# Patient Record
Sex: MALE | Race: WHITE | Employment: UNEMPLOYED | URBAN - METROPOLITAN AREA
[De-identification: names, ages, dates, MRNs, and addresses within clinical notes are randomized per-mention and may not be internally consistent; named-entity substitution may affect disease eponyms.]

---

## 2021-03-23 ENCOUNTER — HOSPITAL ENCOUNTER (OUTPATIENT)
Dept: LAB | Age: 41
Discharge: HOME | End: 2021-03-23
Payer: COMMERCIAL

## 2021-03-23 ENCOUNTER — OFFICE VISIT (OUTPATIENT)
Dept: HEMATOLOGY | Age: 41
End: 2021-03-23
Payer: COMMERCIAL

## 2021-03-23 VITALS
DIASTOLIC BLOOD PRESSURE: 78 MMHG | BODY MASS INDEX: 24.35 KG/M2 | OXYGEN SATURATION: 99 % | WEIGHT: 210.5 LBS | HEIGHT: 78 IN | HEART RATE: 78 BPM | TEMPERATURE: 97.5 F | SYSTOLIC BLOOD PRESSURE: 121 MMHG

## 2021-03-23 DIAGNOSIS — B18.2 CHRONIC HEPATITIS C WITHOUT HEPATIC COMA (HCC): ICD-10-CM

## 2021-03-23 DIAGNOSIS — B18.2 CHRONIC HEPATITIS C WITHOUT HEPATIC COMA (HCC): Primary | ICD-10-CM

## 2021-03-23 PROBLEM — F19.91 H/O INTRAVENOUS DRUG USE IN REMISSION: Status: ACTIVE | Noted: 2021-03-23

## 2021-03-23 LAB
ALBUMIN SERPL-MCNC: 4.1 G/DL (ref 3.4–5)
ALBUMIN/GLOB SERPL: 1.1 {RATIO} (ref 0.8–1.7)
ALP SERPL-CCNC: 106 U/L (ref 45–117)
ALT SERPL-CCNC: 726 U/L (ref 16–61)
ANION GAP SERPL CALC-SCNC: 3 MMOL/L (ref 3–18)
AST SERPL-CCNC: 288 U/L (ref 10–38)
BASOPHILS # BLD: 0 K/UL (ref 0–0.1)
BASOPHILS NFR BLD: 0 % (ref 0–2)
BILIRUB DIRECT SERPL-MCNC: 0.3 MG/DL (ref 0–0.2)
BILIRUB SERPL-MCNC: 0.7 MG/DL (ref 0.2–1)
BUN SERPL-MCNC: 16 MG/DL (ref 7–18)
BUN/CREAT SERPL: 21 (ref 12–20)
CALCIUM SERPL-MCNC: 8.8 MG/DL (ref 8.5–10.1)
CHLORIDE SERPL-SCNC: 101 MMOL/L (ref 100–111)
CO2 SERPL-SCNC: 32 MMOL/L (ref 21–32)
CREAT SERPL-MCNC: 0.77 MG/DL (ref 0.6–1.3)
DIFFERENTIAL METHOD BLD: ABNORMAL
EOSINOPHIL # BLD: 0.1 K/UL (ref 0–0.4)
EOSINOPHIL NFR BLD: 2 % (ref 0–5)
ERYTHROCYTE [DISTWIDTH] IN BLOOD BY AUTOMATED COUNT: 13 % (ref 11.6–14.5)
GLOBULIN SER CALC-MCNC: 3.7 G/DL (ref 2–4)
GLUCOSE SERPL-MCNC: 77 MG/DL (ref 74–99)
HCT VFR BLD AUTO: 40.9 % (ref 36–48)
HGB BLD-MCNC: 13.9 G/DL (ref 13–16)
LYMPHOCYTES # BLD: 1.4 K/UL (ref 0.9–3.6)
LYMPHOCYTES NFR BLD: 40 % (ref 21–52)
MCH RBC QN AUTO: 30.2 PG (ref 24–34)
MCHC RBC AUTO-ENTMCNC: 34 G/DL (ref 31–37)
MCV RBC AUTO: 88.9 FL (ref 74–97)
MONOCYTES # BLD: 0.5 K/UL (ref 0.05–1.2)
MONOCYTES NFR BLD: 16 % (ref 3–10)
NEUTS SEG # BLD: 1.4 K/UL (ref 1.8–8)
NEUTS SEG NFR BLD: 42 % (ref 40–73)
PLATELET # BLD AUTO: 191 K/UL (ref 135–420)
PMV BLD AUTO: 10.9 FL (ref 9.2–11.8)
POTASSIUM SERPL-SCNC: 4.2 MMOL/L (ref 3.5–5.5)
PROT SERPL-MCNC: 7.8 G/DL (ref 6.4–8.2)
RBC # BLD AUTO: 4.6 M/UL (ref 4.7–5.5)
SODIUM SERPL-SCNC: 136 MMOL/L (ref 136–145)
WBC # BLD AUTO: 3.4 K/UL (ref 4.6–13.2)

## 2021-03-23 PROCEDURE — 87902 NFCT AGT GNTYP ALYS HEP C: CPT

## 2021-03-23 PROCEDURE — 36415 COLL VENOUS BLD VENIPUNCTURE: CPT

## 2021-03-23 PROCEDURE — 85025 COMPLETE CBC W/AUTO DIFF WBC: CPT

## 2021-03-23 PROCEDURE — 80076 HEPATIC FUNCTION PANEL: CPT

## 2021-03-23 PROCEDURE — 86708 HEPATITIS A ANTIBODY: CPT

## 2021-03-23 PROCEDURE — 80048 BASIC METABOLIC PNL TOTAL CA: CPT

## 2021-03-23 PROCEDURE — 86704 HEP B CORE ANTIBODY TOTAL: CPT

## 2021-03-23 PROCEDURE — 87521 HEPATITIS C PROBE&RVRS TRNSC: CPT

## 2021-03-23 PROCEDURE — 99203 OFFICE O/P NEW LOW 30 MIN: CPT | Performed by: INTERNAL MEDICINE

## 2021-03-23 RX ORDER — BUPRENORPHINE HYDROCHLORIDE, NALOXONE HYDROCHLORIDE 8; 2 MG/1; MG/1
FILM, SOLUBLE BUCCAL; SUBLINGUAL
COMMUNITY
Start: 2021-03-11 | End: 2021-12-06

## 2021-03-23 NOTE — PROGRESS NOTES
[facility]      Frank Alvarez MD, FACP, FACG, FAASLD    David Verma MD, MPH      MARKOS Amaya-IDANIA Vanessa, [facility]     April S Kei, [facility]   Veronn Hunter, [facility]-    Dee Palma, [facility]    at [facility]    [address], [address]    [place]  [address] [phone]    FAX: [phone]   [facility]    at [facility]    [address], [address]    [place]  [address] [phone]    FAX: [phone]         Patient Care Team:  Gris Olmstead NP as PCP - General (Nurse Practitioner)      Problem List  Date Reviewed: 3/23/2021          Codes Class Noted    Chronic hepatitis C (HCC) ICD-10-CM: B18.2  ICD-9-CM: 070.54  3/23/2021        H/O intravenous drug use in remission ICD-10-CM: Z87.898  ICD-9-CM: 305.93  3/23/2021              The clinicians listed above have asked me to see Dale Sifuentes in consultation regarding chronic HCV and its management.  All medical records sent by the referring physicians were reviewed    The patient is a 41 y.o.  male who was found to have abnormalities in liver chemistries and subsequently tested positive for chronic HCV in 3/2021.      Risk factors for acquiring HCV are IV drug use, cocaine in 2005 - 12/2020.  There was no history of an episode of acute icteric hepatitis at the time of these risk factors.      Imaging of the liver was not performed.      An assessment of liver fibrosis with biopsy or elastography has not been performed.      The patient has never received treatment for chronic HCV.      The patient has the following symptoms which could be attributed to the liver disorder:  fatigue, pain in the right side over the liver, arthralgias,     The patient is not experiencing the following symptoms which could  be attributed to liver disorder: problems concentrating, swelling of the abdomen,   swelling of the lower extremities,     The patient completes all daily activities without any functional limitations. ASSESSMENT AND PLAN:  Chronic HCV   Chronic HCV of unclear severity. Liver transaminases are elevated. ALP is normal.  Liver function is normal.  The platelet count is normal.      Will perform laboratory testing to monitor liver function and degree of liver injury. This included BMP, hepatic panel, CBC with platelet count,     Will perform and/or review results of HCV viral load, HCV genotype to define the specific treatment and duration of treatment that will be required. Will perform serologic and virologic studies to assess for other causes of chronic liver disease. Will perform imaging of the liver with ultrasound. The need to perform an assessment of liver fibrosis was discussed with the patient. The Fibroscan can assess liver fibrosis and determine if a patient has advanced fibrosis or cirrhosis without the need for liver biopsy. This will be performed at the next office visit. Since the patient has Medicaid the degree of fibrosis will be assessed by HCV Fibrosure and FIB-4. Chronic HCV Treatment  The patient has not been treated for HCV. The patient has HCV genotype that is not yet defined. Discussed the treatment alternatives. The SVR/cure rate for HCV now exceeds 97% without significant side effects for most patients with HCV. The specific treatment is dependent upon genotype, viral load and histology. Screening for Hepatocellular Carcinoma  HCC screening is not necessary if the patient has no evidence of cirrhosis. Treatment of other medical problems in patients with chronic liver disease  There are no contraindications for the patient to take most medications that are necessary for treatment of other medical issues.     Normal doses of acetaminophen, as recommended on the label of the bottle, are not hepatotoxic except in the setting of daily alcohol use, even in patients with cirrhosis and can be utilized for pain. Counseling for alcohol in patients with chronic liver disease  The patient was counseled regarding alcohol consumption and the effect of alcohol on chronic liver disease. The patient has cirrhosis and was advised to be abstinent from all alcohol including non-alcoholic beer which does contain some alcohol. The patient does not consume any significant amount of alcohol. Substance Use  The patient was counseled regarding the risk of overdose and death from using opioids and other narcotic drugs. Discussed the risk of becoming reinfected with HCV once they are cured if they resume IV drug use or inhaling drugs nasally. The patient has not used drugs since 12/2020. There is no contraindication to treating HCV in patients who are actively using drugs and the SVR/cure rate is the same as persons who no not use drugs. Vaccinations   The need for vaccination against viral hepatitis A and B will be assessed with serologic and instituted as appropriate. Routine vaccinations against other bacterial and viral agents can be performed as indicated. Annual flu vaccination should be administered if indicated. ALLERGIES  No Known Allergies    MEDICATIONS  Current Outpatient Medications   Medication Sig    Suboxone 8-2 mg film sublingaul film DISSOLVE ONE FILM UNDER THE TONGUE ONCE A DAY    omeprazole-sodium bicarbonate 40-1,680 mg Pack Take  by mouth. No current facility-administered medications for this visit. SYSTEM REVIEW NOT RELATED TO LIVER DISEASE OR REVIEWED ABOVE:  Constitution systems: Negative for fever, chills, weight gain, weight loss. Eyes: Negative for visual changes. ENT: Negative for sore throat, painful swallowing. Respiratory: Negative for cough, hemoptysis, SOB.    Cardiology: Negative for chest pain, palpitations. GI:  Negative for constipation or diarrhea. : Negative for urinary frequency, dysuria, hematuria, nocturia. Skin: Negative for rash. Hematology: Negative for easy bruising, blood clots. Musculo-skelatal: Negative for back pain, muscle pain, weakness. Neurologic: Negative for headaches, dizziness, vertigo, memory problems not related to HE. Psychology: Negative for anxiety, depression. FAMILY HISTORY:  The father Has/had the following following chronic disease(s): unknown medical issues. The mother Has/had the following chronic disease(s): None. There is no family history of liver disease. SOCIAL HISTORY:  The patient has never been . The patient has no children. The patient stopped using tobacco products in 2/2021. The patient currently vapes. The patient has previously consumed alcohol in excess. The patient has been abstinent from alcohol since 12/2021. 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 PHYSICAL EXAMINATION:  Visit Vitals  /78   Pulse 78   Temp 97.5 °F (36.4 °C) (Tympanic)   Ht 6' 6.5\" (1.994 m)   Wt 210 lb 8 oz (95.5 kg)   SpO2 99%   BMI 24.02 kg/m²     General: No acute distress. Eyes: Sclera anicteric. ENT: No oral lesions. Thyroid normal.  Nodes: No adenopathy. Skin: No spider angiomata. No jaundice. No palmar erythema. Respiratory: Lungs clear to auscultation. Cardiovascular: Regular heart rate. No murmurs. No JVD. Abdomen: Soft non-tender. Liver size normal to percussion/palpation. Spleen not palpable. No obvious ascites. Extremities: No edema. No muscle wasting. No gross arthritic changes. Neurologic: Alert and oriented. Cranial nerves grossly intact. No asterixis. LABORATORY STUDIES:  From 3/2021  AST/ALT/ALP/T Bili/ALB:  282/678/128/0.6/4.3  WBC/HB/PLT/INR:  3.7/13.4/188  NA/BUN/CREAT:  14/0.65    SEROLOGIES:  3/2021.   HBsurface antigen negative, anti-HCV positive, anti-HIV negative    LIVER HISTOLOGY:  Not available or performed    ENDOSCOPIC PROCEDURES:  Not available or performed    RADIOLOGY:  Not available or performed    OTHER TESTING:  Not available or performed    FOLLOW-UP:  All of the issues listed above in the Assessment and Plan were discussed with the patient. All questions were answered. The patient expressed a clear understanding of the above. [address] [place] [address] in 4 weeks to review all data and determine the treatment plan.       Benito Collazo MD  [address] [address]  [address], [address] [place] [address], [address]  [address] [place] [address]

## 2021-03-23 NOTE — Clinical Note
3/23/2021 Patient: Aneita Severin YOB: 1980 Date of Visit: 3/23/2021 Mil Castellanos NP 
[address] [place] [address] [address] Fax: [phone] Dear Mil Castellanos NP, Thank you for referring Mr. Aneita Severin to [address] [address] for evaluation. My notes for this consultation are attached. If you have questions, please do not hesitate to call me. I look forward to following your patient along with you. Sincerely, Colin Cheng MD

## 2021-03-24 LAB
HAV AB SER QL IA: POSITIVE
HBV CORE AB SERPL QL IA: NEGATIVE

## 2021-03-26 LAB
HCV GENTYP SERPL NAA+PROBE: NORMAL
HCV RNA SERPL NAA+PROBE-LOG IU: 7.02 HCV LOG 10IU/ML
HCV RNA SERPL PROBE AMP-ACNC: ABNORMAL HCVIU/ML
HCV RNA SERPL QL NAA+PROBE: POSITIVE
PLEASE NOTE, 550474: NORMAL

## 2021-03-30 ENCOUNTER — HOSPITAL ENCOUNTER (OUTPATIENT)
Dept: ULTRASOUND IMAGING | Age: 41
Discharge: HOME | End: 2021-03-30
Payer: COMMERCIAL

## 2021-03-30 DIAGNOSIS — B18.2 CHRONIC HEPATITIS C WITHOUT HEPATIC COMA (HCC): ICD-10-CM

## 2021-03-30 PROCEDURE — 76705 ECHO EXAM OF ABDOMEN: CPT

## 2021-04-22 ENCOUNTER — OFFICE VISIT (OUTPATIENT)
Dept: HEMATOLOGY | Age: 41
End: 2021-04-22
Payer: COMMERCIAL

## 2021-04-22 VITALS
OXYGEN SATURATION: 98 % | SYSTOLIC BLOOD PRESSURE: 123 MMHG | HEIGHT: 78 IN | WEIGHT: 216.8 LBS | DIASTOLIC BLOOD PRESSURE: 71 MMHG | TEMPERATURE: 98 F | HEART RATE: 76 BPM | RESPIRATION RATE: 15 BRPM | BODY MASS INDEX: 25.08 KG/M2

## 2021-04-22 DIAGNOSIS — B18.2 CHRONIC HEPATITIS C WITHOUT HEPATIC COMA (HCC): Primary | ICD-10-CM

## 2021-04-22 PROCEDURE — 91200 LIVER ELASTOGRAPHY: CPT | Performed by: NURSE PRACTITIONER

## 2021-04-22 PROCEDURE — 99214 OFFICE O/P EST MOD 30 MIN: CPT | Performed by: NURSE PRACTITIONER

## 2021-04-22 RX ORDER — VELPATASVIR AND SOFOSBUVIR 100; 400 MG/1; MG/1
1 TABLET, FILM COATED ORAL DAILY
Qty: 28 TAB | Refills: 2 | Status: SHIPPED | OUTPATIENT
Start: 2021-04-22 | End: 2021-08-31 | Stop reason: SINTOL

## 2021-04-22 NOTE — PROGRESS NOTES
Love Fernandez MD, Mónica Dailey MD, MPH      Remy Ludwig, TIAN Zarate Ma, [facility]     Suzanne Choudhury, [facility]   Day[facility]    [facility]       Anam Oviedo [facility]    at [address], [address] [place] [address] 22.    [phone]    FAX: [address], [address]    [phone]    FAX: [phone]        Patient Care Team:  Mine Hernandez NP as PCP - General (Nurse Practitioner)      Problem List  Date Reviewed: 4/22/2021          Codes Class Noted    Chronic hepatitis C [facility]) ICD-10-CM: B18.2  ICD-9-CM: 070.54  3/23/2021        H/O intravenous drug use in remission ICD-10-CM: Z87.898  ICD-9-CM: 305.93  3/23/2021              Trinh Cole is being seen at [address] for management of chronic HCV. The active problem list, all pertinent past medical history, medications, liver histology, radiologic findings, and laboratory findings related to the liver disorder were reviewed and discussed with the patient. The patient is a 46 y.o.  male who was found to have abnormalities in liver chemistries and subsequently tested positive for chronic HCV in 3/2021. Risk factors for acquiring HCV are IV drug use, cocaine in 2005 - 12/2020. There was no history of an episode of acute icteric hepatitis at the time of these risk factors. The most recent imaging of the liver was Ultrasound performed in 3/2021. Results suggest that the liver is normal.  No liver mass lesions noted. Assessment of liver fibrosis with Fibroscan was performed in the office today. The result was 7.8 kPa which correlates with stage 1 portal fibrosis.  The CAP score of 307 suggests hepatic steatosis. The patient has never received treatment for chronic HCV. The patient has the following symptoms which could be attributed to the liver disorder:  fatigue, pain in the right side over the liver, arthralgias. The patient is not experiencing the following symptoms which could be attributed to liver disorder: problems concentrating, swelling of the abdomen, swelling of the lower extremities. The patient completes all daily activities without any functional limitations. ASSESSMENT AND PLAN:   Chronic HCV   Chronic HCV with portal fibrosis. Liver transaminases are elevated. ALP is normal.  Liver function is normal.  The platelet count is normal.      Have performed laboratory testing to monitor liver function and degree of liver injury. This included BMP, hepatic panel, CBC with platelet count. Have reviewed results of HCV viral load, HCV genotype to define the specific treatment and duration of treatment that will be required. Serologic and virologic studies to assess for other causes of chronic liver disease were negative. The need to perform an assessment of liver fibrosis was discussed with the patient. The Fibroscan can assess liver fibrosis and determine if a patient has advanced fibrosis or cirrhosis without the need for liver biopsy. Chronic HCV Treatment  The patient has not been treated for HCV. The patient has HCV genotype 1a. Discussed the treatment alternatives. The SVR/cure rate for HCV now exceeds 97% without significant side effects for most patients with HCV. The specific treatment is dependent upon genotype, viral load and histology. The patient should be treated with Mavyret (glecaprevir and piprentasvir) or Epclusa (sofosbuvir and valpitasvir). The SVR/cure rate for is over 95%.     We will request pre-authorization for the HCV medication subject to the approval guidelines of the patient's insurance carrier. If the patient is denied we may appeal on their behalf. I have explained to him that I will order the medications from the specialty pharmacy and they will be delivered to his home. He may begin taking the treatment medications as soon as they arrive. He is to call and make an appointment for treatment week #4 once he begins therapy. He voiced understanding of this plan. Screening for Hepatocellular Carcinoma  HCC screening is not necessary if the patient has no evidence of cirrhosis. Treatment of other medical problems in patients with chronic liver disease  There are no contraindications for the patient to take most medications that are necessary for treatment of other medical issues. Normal doses of acetaminophen, as recommended on the label of the bottle, are not hepatotoxic except in the setting of daily alcohol use, even in patients with cirrhosis and can be utilized for pain. Counseling for alcohol in patients with chronic liver disease  The patient was counseled regarding alcohol consumption and the effect of alcohol on chronic liver disease. The patient has cirrhosis and was advised to be abstinent from all alcohol including non-alcoholic beer which does contain some alcohol. The patient does not consume any significant amount of alcohol. Substance Use  The patient was counseled regarding the risk of overdose and death from using opioids and other narcotic drugs. Discussed the risk of becoming reinfected with HCV once they are cured if they resume IV drug use or inhaling drugs nasally. The patient has not used drugs since 12/2020. There is no contraindication to treating HCV in patients who are actively using drugs and the SVR/cure rate is the same as persons who do not use drugs. Vaccinations   The need for vaccination against viral hepatitis A and B will be assessed with serologic and instituted as appropriate.   Routine vaccinations against other bacterial and viral agents can be performed as indicated. Annual flu vaccination should be administered if indicated. ALLERGIES  No Known Allergies    MEDICATIONS  Current Outpatient Medications   Medication Sig    Suboxone 8-2 mg film sublingaul film DISSOLVE ONE FILM UNDER THE TONGUE ONCE A DAY     No current facility-administered medications for this visit. SYSTEM REVIEW NOT RELATED TO LIVER DISEASE OR REVIEWED ABOVE:  Constitution systems: Negative for fever, chills, weight gain, weight loss. Eyes: Negative for visual changes. ENT: Negative for sore throat, painful swallowing. Respiratory: Negative for cough, hemoptysis, SOB. Cardiology: Negative for chest pain, palpitations. GI:  Negative for constipation or diarrhea. : Negative for urinary frequency, dysuria, hematuria, nocturia. Skin: Negative for rash. Hematology: Negative for easy bruising, blood clots. Musculo-skelatal: Negative for back pain, muscle pain, weakness. Neurologic: Negative for headaches, dizziness, vertigo, memory problems not related to HE. Psychology: Negative for anxiety, depression. FAMILY HISTORY:  The father Has/had the following following chronic disease(s): unknown medical issues. The mother Has/had the following chronic disease(s): None. There is no family history of liver disease. SOCIAL HISTORY:  The patient has never been . The patient has no children. The patient stopped using tobacco products in 2/2021. The patient currently vapes. The patient has previously consumed alcohol in excess. The patient has been abstinent from alcohol since 12/2021. 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         PHYSICAL EXAMINATION:  Visit Vitals  /71 (BP 1 Location: Left upper arm, BP Patient Position: Sitting, BP Cuff Size: Small adult)   Pulse 76   Temp 98 °F (36.7 °C)   Resp 15   Ht 6' 6.6\" (1.996 m)   Wt 216 lb 12.8 oz (98.3 kg) SpO2 98%   BMI 24.67 kg/m²     General: No acute distress. Eyes: Sclera anicteric. ENT: No oral lesions. Thyroid normal.  Nodes: No adenopathy. Skin: No spider angiomata. No jaundice. No palmar erythema. Respiratory: Lungs clear to auscultation. Cardiovascular: Regular heart rate. No murmurs. No JVD. Abdomen: Soft non-tender. Liver size normal to percussion/palpation. Spleen not palpable. No obvious ascites. Extremities: No edema. No muscle wasting. No gross arthritic changes. Neurologic: Alert and oriented. Cranial nerves grossly intact. No asterixis. LABORATORY STUDIES:  Liver [place] of [address] Units 3/23/2021   WBC 4.6 - 13.2 K/uL 3.4 (L)   ANC 1.8 - 8.0 K/UL 1.4 (L)   HGB 13.0 - 16.0 g/dL 13.9    - 420 K/uL 191   AST 10 - 38 U/L 288 (H)   ALT 16 - 61 U/L 726 (H)   Alk Phos 45 - 117 U/L 106   Bili, Total 0.2 - 1.0 MG/DL 0.7   Bili, Direct 0.0 - 0.2 MG/DL 0.3 (H)   Albumin 3.4 - 5.0 g/dL 4.1   BUN 7.0 - 18 MG/DL 16   Creat 0.6 - 1.3 MG/DL 0.77   Na 136 - 145 mmol/L 136   K 3.5 - 5.5 mmol/L 4.2   Cl 100 - 111 mmol/L 101   CO2 21 - 32 mmol/L 32   Glucose 74 - 99 mg/dL 77       SEROLOGIES:  3/2021. HBsurface antigen negative, anti-HCV positive, anti-HIV negative    Serologies Latest Ref Rng & Units 3/23/2021   Hep A Ab, Total Negative   Positive (A)   Hep B Core Ab, Total Negative   Negative   Hep C Genotype  1a     HCV RNA:  3/2021. 82,685,273 IU/mL    LIVER HISTOLOGY:  4/2021. [facility]. EkPa was 7.8. IQR/med 13%. . The results suggested a fibrosis level of F1. The CAP score suggests there is hepatic steatosis. ENDOSCOPIC PROCEDURES:  Not available or performed    RADIOLOGY:  Not available or performed    OTHER TESTING:  Not available or performed    FOLLOW-UP:  All of the issues listed above in the Assessment and Plan were discussed with the patient. All questions were answered.   The patient expressed a clear understanding of the above.     Return to [facility] [address] for monitoring of HCV treatment in 4 weeks after starting medication      Barbara Canada, BERNIEPAOLA-BC  [address] of [address], [address]  [phone]

## 2021-08-31 ENCOUNTER — HOSPITAL ENCOUNTER (OUTPATIENT)
Dept: LAB | Age: 41
Discharge: HOME | End: 2021-08-31
Payer: COMMERCIAL

## 2021-08-31 ENCOUNTER — OFFICE VISIT (OUTPATIENT)
Dept: HEMATOLOGY | Age: 41
End: 2021-08-31
Payer: COMMERCIAL

## 2021-08-31 VITALS
DIASTOLIC BLOOD PRESSURE: 87 MMHG | TEMPERATURE: 97.8 F | HEART RATE: 79 BPM | WEIGHT: 216 LBS | BODY MASS INDEX: 24.99 KG/M2 | HEIGHT: 78 IN | OXYGEN SATURATION: 99 % | SYSTOLIC BLOOD PRESSURE: 120 MMHG | RESPIRATION RATE: 19 BRPM

## 2021-08-31 DIAGNOSIS — B18.2 CHRONIC HEPATITIS C WITHOUT HEPATIC COMA (HCC): ICD-10-CM

## 2021-08-31 DIAGNOSIS — B18.2 CHRONIC HEPATITIS C WITHOUT HEPATIC COMA (HCC): Primary | ICD-10-CM

## 2021-08-31 PROCEDURE — 99213 OFFICE O/P EST LOW 20 MIN: CPT | Performed by: NURSE PRACTITIONER

## 2021-08-31 PROCEDURE — 87521 HEPATITIS C PROBE&RVRS TRNSC: CPT

## 2021-08-31 PROCEDURE — 36415 COLL VENOUS BLD VENIPUNCTURE: CPT

## 2021-08-31 RX ORDER — ESCITALOPRAM OXALATE 10 MG/1
10 TABLET ORAL DAILY
COMMUNITY

## 2021-08-31 NOTE — PROGRESS NOTES
Madelyn Louie MD, Nneka Gotti MD, MPH      TIAN Olivares, [facility]-BC     April S [facility]   Latricia Devine SABINA [facility]       Anam Oviedo [facility] 136    at [address]    [facility], [address] [place] [address], [facility] 22.    [phone]    FAX: [address] [place] [address], [address]    [phone]    FAX: [phone]        Patient Care Team:  Linh Smith NP as PCP - General (Nurse Practitioner)      Problem List  Date Reviewed: 9/1/2021        Codes Class Noted    Chronic hepatitis C [facility]) ICD-10-CM: B18.2  ICD-9-CM: 070.54  3/23/2021        H/O intravenous drug use in remission ICD-10-CM: Z87.898  ICD-9-CM: 305.93  3/23/2021              Quyen Jacinto is being seen at [address] for management of chronic HCV. The active problem list, all pertinent past medical history, medications, liver histology, radiologic findings, and laboratory findings related to the liver disorder were reviewed and discussed with the patient. The patient is a 46 y.o. male who was found to have abnormalities in liver chemistries and subsequently tested positive for chronic HCV in 3/2021. The most recent imaging of the liver was Ultrasound performed in 3/2021. Results suggest that the liver is normal.  No liver mass lesions noted. Assessment of liver fibrosis with Fibroscan was performed in the office today. The result was 7.8 kPa which correlates with stage 1 portal fibrosis. The CAP score of 307 suggests hepatic steatosis. The patient started treatment with Epclusa (sofosbuvir and valpitasvir).  He reports stopping medication for a week and then started back up with the rest of the medication. The patient has the following symptoms which could be attributed to the liver disorder:  fatigue, pain in the right side over the liver, arthralgias. The patient is not experiencing the following symptoms which could be attributed to liver disorder: problems concentrating, swelling of the abdomen, swelling of the lower extremities. The patient completes all daily activities without any functional limitations. ASSESSMENT AND PLAN:   Chronic HCV   Chronic HCV with portal fibrosis. Liver transaminases are elevated. ALP is normal.  Liver function is normal.  The platelet count is normal.      Will perform laboratory testing to monitor liver function and degree of liver injury. This included BMP, hepatic panel, CBC with platelet count. Serologic and virologic studies to assess for other causes of chronic liver disease were negative. The need to perform an assessment of liver fibrosis was discussed with the patient. The Fibroscan can assess liver fibrosis and determine if a patient has advanced fibrosis or cirrhosis without the need for liver biopsy. Chronic HCV Treatment  The patient was treated for HCV with Epclusa (sofosbuvir and valpitasvir) however he did not follow prescription instructions and missed approximately one week of treatment. The patient has HCV genotype 1a. Will perform laboratory testing to monitor response to HCV treatment. This will include HCV RNA QL REFLX TO QT. If HCV RNA is positive will need to retreat with Mavyret (glecaprevir/pibrentasvir)    If HCV RNA is negative will reassess in 12 weeks for SVR/cure. Screening for Hepatocellular Carcinoma  HCC screening is not necessary if the patient has no evidence of cirrhosis.     Treatment of other medical problems in patients with chronic liver disease  There are no contraindications for the patient to take most medications that are necessary for treatment of other medical issues. Normal doses of acetaminophen, as recommended on the label of the bottle, are not hepatotoxic except in the setting of daily alcohol use, even in patients with cirrhosis and can be utilized for pain. Counseling for alcohol in patients with chronic liver disease  The patient was counseled regarding alcohol consumption and the effect of alcohol on chronic liver disease. The patient has cirrhosis and was advised to be abstinent from all alcohol including non-alcoholic beer which does contain some alcohol. The patient does not consume any significant amount of alcohol. Substance Use  The patient was counseled regarding the risk of overdose and death from using opioids and other narcotic drugs. Discussed the risk of becoming reinfected with HCV once they are cured if they resume IV drug use or inhaling drugs nasally. The patient has not used drugs since 12/2020. There is no contraindication to treating HCV in patients who are actively using drugs and the SVR/cure rate is the same as persons who do not use drugs. Vaccinations   The need for vaccination against viral hepatitis A and B will be assessed with serologic and instituted as appropriate. Routine vaccinations against other bacterial and viral agents can be performed as indicated. Annual flu vaccination should be administered if indicated. ALLERGIES  No Known Allergies    MEDICATIONS  Current Outpatient Medications   Medication Sig    escitalopram oxalate (LEXAPRO) 10 mg tablet Take 10 mg by mouth daily.  Suboxone 8-2 mg film sublingaul film DISSOLVE ONE FILM UNDER THE TONGUE ONCE A DAY     No current facility-administered medications for this visit. SYSTEM REVIEW NOT RELATED TO LIVER DISEASE OR REVIEWED ABOVE:  Constitution systems: Negative for fever, chills, weight gain, weight loss. Eyes: Negative for visual changes. ENT: Negative for sore throat, painful swallowing.    Respiratory: Negative for cough, hemoptysis, SOB.   Cardiology: Negative for chest pain, palpitations. GI:  Negative for constipation or diarrhea. : Negative for urinary frequency, dysuria, hematuria, nocturia. Skin: Negative for rash. Hematology: Negative for easy bruising, blood clots. Musculo-skelatal: Negative for back pain, muscle pain, weakness. Neurologic: Negative for headaches, dizziness, vertigo, memory problems not related to HE. Psychology: Negative for anxiety, depression. FAMILY HISTORY:  The father Has/had the following following chronic disease(s): unknown medical issues. The mother Has/had the following chronic disease(s): None. There is no family history of liver disease. SOCIAL HISTORY:  The patient has never been . The patient has no children. The patient stopped using tobacco products in 2/2021. The patient currently vapes. The patient has previously consumed alcohol in excess. The patient has been abstinent from alcohol since 12/2021. 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 PHYSICAL EXAMINATION:  Visit Vitals  /87 (BP 1 Location: Left upper arm, BP Patient Position: Sitting, BP Cuff Size: Small adult)   Pulse 79   Temp 97.8 °F (36.6 °C)   Resp 19   Ht 6' 6\" (1.981 m)   Wt 216 lb (98 kg)   SpO2 99%   BMI 24.96 kg/m²     General: No acute distress. Eyes: Sclera anicteric. ENT: No oral lesions. Thyroid normal.  Nodes: No adenopathy. Skin: No spider angiomata. No jaundice. No palmar erythema. Respiratory: Lungs clear to auscultation. Cardiovascular: Regular heart rate. No murmurs. No JVD. Abdomen: Soft non-tender. Liver size normal to percussion/palpation. Spleen not palpable. No obvious ascites. Extremities: No edema. No muscle wasting. No gross arthritic changes. Neurologic: Alert and oriented. Cranial nerves grossly intact. No asterixis.     LABORATORY STUDIES:  From 8/2021  AST/ALT/ALP/T Bili/ALB: 61/35/52/1.2/4.8  WBC/HB/PLT: 6.2/12.7/209  NA/BUN/CREAT: 137/25/0.7    SEROLOGIES:  3/2021. HBsurface antigen negative, anti-HCV positive, anti-HIV negative    Serologies Latest Ref Rng & Units 3/23/2021   Hep A Ab, Total Negative   Positive (A)   Hep B Core Ab, Total Negative   Negative   Hep C Genotype  1a     HCV RNA:  3/2021. 32,498,218 IU/mL    LIVER HISTOLOGY:  4/2021. [facility]. EkPa was 7.8. IQR/med 13%. . The results suggested a fibrosis level of F1. The CAP score suggests there is hepatic steatosis. ENDOSCOPIC PROCEDURES:  Not available or performed    RADIOLOGY:  3/2021. Ultrasound of liver. Normal appearing liver. No liver mass lesions. OTHER TESTING:  Not available or performed    FOLLOW-UP:  All of the issues listed above in the Assessment and Plan were discussed with the patient. All questions were answered. The patient expressed a clear understanding of the above. Return to [place] [address] for monitoring of HCV treatment in 3 months to assess for SVR/cure.        Cherise Ganser, AGPCNP-BC  . Luca Borges [facility] [place] [address]  [facility], [address] [phone]

## 2021-09-03 LAB — HCV RNA SERPL QL NAA+PROBE: NEGATIVE

## 2021-12-06 ENCOUNTER — HOSPITAL ENCOUNTER (OUTPATIENT)
Dept: LAB | Age: 41
Discharge: HOME | End: 2021-12-06
Payer: COMMERCIAL

## 2021-12-06 ENCOUNTER — OFFICE VISIT (OUTPATIENT)
Dept: HEMATOLOGY | Age: 41
End: 2021-12-06
Payer: COMMERCIAL

## 2021-12-06 VITALS
BODY MASS INDEX: 24.96 KG/M2 | HEIGHT: 78 IN | SYSTOLIC BLOOD PRESSURE: 118 MMHG | HEART RATE: 73 BPM | OXYGEN SATURATION: 98 % | DIASTOLIC BLOOD PRESSURE: 77 MMHG | RESPIRATION RATE: 18 BRPM | TEMPERATURE: 97.5 F

## 2021-12-06 DIAGNOSIS — B18.2 CHRONIC HEPATITIS C WITHOUT HEPATIC COMA (HCC): Primary | ICD-10-CM

## 2021-12-06 DIAGNOSIS — B18.2 CHRONIC HEPATITIS C WITHOUT HEPATIC COMA (HCC): ICD-10-CM

## 2021-12-06 LAB
ALBUMIN SERPL-MCNC: 4.2 G/DL (ref 3.4–5)
ALBUMIN/GLOB SERPL: 1.2 {RATIO} (ref 0.8–1.7)
ALP SERPL-CCNC: 56 U/L (ref 45–117)
ALT SERPL-CCNC: 28 U/L (ref 16–61)
ANION GAP SERPL CALC-SCNC: 8 MMOL/L (ref 3–18)
AST SERPL-CCNC: 14 U/L (ref 10–38)
BASOPHILS # BLD: 0 K/UL (ref 0–0.1)
BASOPHILS NFR BLD: 1 % (ref 0–2)
BILIRUB DIRECT SERPL-MCNC: 0.2 MG/DL (ref 0–0.2)
BILIRUB SERPL-MCNC: 0.6 MG/DL (ref 0.2–1)
BUN SERPL-MCNC: 14 MG/DL (ref 7–18)
BUN/CREAT SERPL: 18 (ref 12–20)
CALCIUM SERPL-MCNC: 9 MG/DL (ref 8.5–10.1)
CHLORIDE SERPL-SCNC: 103 MMOL/L (ref 100–111)
CO2 SERPL-SCNC: 27 MMOL/L (ref 21–32)
CREAT SERPL-MCNC: 0.77 MG/DL (ref 0.6–1.3)
DIFFERENTIAL METHOD BLD: NORMAL
EOSINOPHIL # BLD: 0.1 K/UL (ref 0–0.4)
EOSINOPHIL NFR BLD: 2 % (ref 0–5)
ERYTHROCYTE [DISTWIDTH] IN BLOOD BY AUTOMATED COUNT: 13.1 % (ref 11.6–14.5)
GLOBULIN SER CALC-MCNC: 3.5 G/DL (ref 2–4)
GLUCOSE SERPL-MCNC: 78 MG/DL (ref 74–99)
HCT VFR BLD AUTO: 42.2 % (ref 36–48)
HGB BLD-MCNC: 14 G/DL (ref 13–16)
IMM GRANULOCYTES # BLD AUTO: 0 K/UL (ref 0–0.04)
IMM GRANULOCYTES NFR BLD AUTO: 0 % (ref 0–0.5)
LYMPHOCYTES # BLD: 2.8 K/UL (ref 0.9–3.6)
LYMPHOCYTES NFR BLD: 45 % (ref 21–52)
MCH RBC QN AUTO: 28.4 PG (ref 24–34)
MCHC RBC AUTO-ENTMCNC: 33.2 G/DL (ref 31–37)
MCV RBC AUTO: 85.6 FL (ref 78–100)
MONOCYTES # BLD: 0.6 K/UL (ref 0.05–1.2)
MONOCYTES NFR BLD: 9 % (ref 3–10)
NEUTS SEG # BLD: 2.7 K/UL (ref 1.8–8)
NEUTS SEG NFR BLD: 43 % (ref 40–73)
NRBC # BLD: 0 K/UL (ref 0–0.01)
NRBC BLD-RTO: 0 PER 100 WBC
PLATELET # BLD AUTO: 221 K/UL (ref 135–420)
PMV BLD AUTO: 10.7 FL (ref 9.2–11.8)
POTASSIUM SERPL-SCNC: 4 MMOL/L (ref 3.5–5.5)
PROT SERPL-MCNC: 7.7 G/DL (ref 6.4–8.2)
RBC # BLD AUTO: 4.93 M/UL (ref 4.35–5.65)
SODIUM SERPL-SCNC: 138 MMOL/L (ref 136–145)
WBC # BLD AUTO: 6.3 K/UL (ref 4.6–13.2)

## 2021-12-06 PROCEDURE — 36415 COLL VENOUS BLD VENIPUNCTURE: CPT

## 2021-12-06 PROCEDURE — 80048 BASIC METABOLIC PNL TOTAL CA: CPT

## 2021-12-06 PROCEDURE — 99213 OFFICE O/P EST LOW 20 MIN: CPT | Performed by: NURSE PRACTITIONER

## 2021-12-06 PROCEDURE — 85025 COMPLETE CBC W/AUTO DIFF WBC: CPT

## 2021-12-06 PROCEDURE — 80076 HEPATIC FUNCTION PANEL: CPT

## 2021-12-06 PROCEDURE — 87521 HEPATITIS C PROBE&RVRS TRNSC: CPT

## 2021-12-06 RX ORDER — NAPROXEN SODIUM 220 MG
220 TABLET ORAL 2 TIMES DAILY WITH MEALS
COMMUNITY

## 2021-12-06 RX ORDER — GABAPENTIN 300 MG/1
300 CAPSULE ORAL 3 TIMES DAILY
COMMUNITY
End: 2022-08-11 | Stop reason: SDUPTHER

## 2021-12-06 NOTE — PROGRESS NOTES
Remi Etienne MD, Joshua Leal MD, MPH      TIAN Leon, NIKKIP-BETHANY Choudhury [facility]   OLGA Savage [facility]       Anam Oviedo [facility] 136    at [address], [address] [place] [address], [facility] 22.    [phone]    FAX: [address] [place] [address]    at [address], [address]    [phone]    FAX: [phone]        Patient Care Team:  Yadira Mahajan NP as PCP - General (Nurse Practitioner)      Problem List  Date Reviewed: 12/6/2021          Codes Class Noted    Chronic hepatitis C [facility]) ICD-10-CM: B18.2  ICD-9-CM: 070.54  3/23/2021        H/O intravenous drug use in remission ICD-10-CM: Z87.898  ICD-9-CM: 305.93  3/23/2021              Heron Valdivia is being seen at [facility] for management of chronic HCV. The active problem list, all pertinent past medical history, medications, liver histology, radiologic findings, and laboratory findings related to the liver disorder were reviewed and discussed with the patient. The patient is a 46 y.o. male who was found to have abnormalities in liver chemistries and subsequently tested positive for chronic HCV in 3/2021. Since last visit: patient was hospitalized for nontraumatic compartment syndrome of LLE, Sepsis, and Rhabdomyolysis    The most recent imaging of the liver was Ultrasound performed in 3/2021. Results suggest that the liver is normal.  No liver mass lesions noted. Assessment of liver fibrosis with Fibroscan was performed in 4/2021. The result was 7.8 kPa which correlates with stage 1 portal fibrosis. The CAP score of 307 suggests hepatic steatosis.     The patient started treatment with Epclusa (sofosbuvir and valpitasvir). He reports stopping medication for a week and then started back up with the rest of the medication. The patient does not have any symptoms which could be attributed to the liver disorder. The patient has mild limitations in functional activities which can be attributed to other medical problems that are not related to the liver disease. ASSESSMENT AND PLAN:   Chronic HCV   Chronic HCV with portal fibrosis. Liver transaminases are normal.  ALP is elevated. Liver function is normal.  The platelet count is normal.      Will perform laboratory testing to monitor liver function and degree of liver injury. This included BMP, hepatic panel, CBC with platelet count. Serologic and virologic studies to assess for other causes of chronic liver disease were negative. The need to perform an assessment of liver fibrosis was discussed with the patient. The Fibroscan can assess liver fibrosis and determine if a patient has advanced fibrosis or cirrhosis without the need for liver biopsy. Chronic HCV Treatment  The patient was treated for HCV with Epclusa (sofosbuvir and valpitasvir) however he did not follow prescription instructions and missed approximately one week of treatment. The patient has HCV genotype 1a. Will perform laboratory testing to monitor response to HCV treatment and assess for SVR/cure. This will include HCV RNA QL REFLX TO QT. Screening for Hepatocellular Carcinoma  HCC screening is not necessary if the patient has no evidence of cirrhosis. Treatment of other medical problems in patients with chronic liver disease  There are no contraindications for the patient to take most medications that are necessary for treatment of other medical issues.     Normal doses of acetaminophen, as recommended on the label of the bottle, are not hepatotoxic except in the setting of daily alcohol use, even in patients with cirrhosis and can be utilized for pain. Counseling for alcohol in patients with chronic liver disease  The patient was counseled regarding alcohol consumption and the effect of alcohol on chronic liver disease. The patient has cirrhosis and was advised to be abstinent from all alcohol including non-alcoholic beer which does contain some alcohol. The patient does not consume any significant amount of alcohol. Substance Use  The patient was counseled regarding the risk of overdose and death from using opioids and other narcotic drugs. Discussed the risk of becoming reinfected with HCV once they are cured if they resume IV drug use or inhaling drugs nasally. The patient has not used drugs since 12/2020. There is no contraindication to treating HCV in patients who are actively using drugs and the SVR/cure rate is the same as persons who do not use drugs. Vaccinations   Vaccination for viral hepatitis A is not needed. The patient has serologic evidence of prior exposure or vaccination with immunity. Routine vaccinations against other bacterial and viral agents can be performed as indicated. Annual flu vaccination should be administered if indicated. ALLERGIES  No Known Allergies    MEDICATIONS  Current Outpatient Medications   Medication Sig    gabapentin (NEURONTIN) 300 mg capsule Take 300 mg by mouth three (3) times daily.  naproxen sodium (Aleve) 220 mg tablet Take 220 mg by mouth two (2) times daily (with meals).  escitalopram oxalate (LEXAPRO) 10 mg tablet Take 10 mg by mouth daily. No current facility-administered medications for this visit. SYSTEM REVIEW NOT RELATED TO LIVER DISEASE OR REVIEWED ABOVE:  Constitution systems: Negative for fever, chills, weight gain, weight loss. Eyes: Negative for visual changes. ENT: Negative for sore throat, painful swallowing. Respiratory: Negative for cough, hemoptysis, SOB. Cardiology: Negative for chest pain, palpitations.   GI:  Negative for constipation or diarrhea. : Negative for urinary frequency, dysuria, hematuria, nocturia. Skin: Negative for rash. Hematology: Negative for easy bruising, blood clots. Musculo-skelatal: Negative for back pain, muscle pain, weakness. Neurologic: Negative for headaches, dizziness, vertigo, memory problems not related to HE. Psychology: Negative for anxiety, depression. FAMILY HISTORY:  The father Has/had the following following chronic disease(s): unknown medical issues. The mother Has/had the following chronic disease(s): None. There is no family history of liver disease. SOCIAL HISTORY:  The patient has never been . The patient has no children. The patient stopped using tobacco products in 2/2021. The patient currently vapes. The patient has previously consumed alcohol in excess. The patient has been abstinent from alcohol since 2/2021. 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 PHYSICAL EXAMINATION:  Visit Vitals  /77 (BP 1 Location: Left upper arm, BP Patient Position: Sitting, BP Cuff Size: Small adult)   Pulse 73   Temp 97.5 °F (36.4 °C)   Resp 18   Ht 6' 6\" (1.981 m)   SpO2 98%   BMI 24.96 kg/m²     General: No acute distress. Eyes: Sclera anicteric. ENT: No oral lesions. Thyroid normal.  Nodes: No adenopathy. Skin: No spider angiomata. No jaundice. No palmar erythema. Respiratory: Lungs clear to auscultation. Cardiovascular: Regular heart rate. No murmurs. No JVD. Abdomen: Soft non-tender. Liver size normal to percussion/palpation. Spleen not palpable. No obvious ascites. Extremities: No edema. No muscle wasting. No gross arthritic changes. Neurologic: Alert and oriented. Cranial nerves grossly intact. No asterixis. LABORATORY STUDIES:  From 8/2021  AST/ALT/ALP/T Bili/ALB: 61/35/52/1.2/4.8  WBC/HB/PLT: 6.2/12.7/209  NA/BUN/CREAT: 137/25/0.7    SEROLOGIES:  3/2021.   HBsurface antigen negative, anti-HCV positive, anti-HIV negative    Serologies Latest Ref Rng & Units 3/23/2021   Hep A Ab, Total Negative   Positive (A)   Hep B Core Ab, Total Negative   Negative   Hep C Genotype  1a     HCV RNA:  3/2021. 96,061,580 IU/mL  8/2021. Negative    LIVER HISTOLOGY:  4/2021. FibroScan performed at [address]. EkPa was 7.8. IQR/med 13%. . The results suggested a fibrosis level of F1. The CAP score suggests there is hepatic steatosis. ENDOSCOPIC PROCEDURES:  Not available or performed    RADIOLOGY:  3/2021. Ultrasound of liver. Normal appearing liver. No liver mass lesions. OTHER TESTING:  Not available or performed    FOLLOW-UP:  All of the issues listed above in the Assessment and Plan were discussed with the patient. All questions were answered. The patient expressed a clear understanding of the above. No follow-up at [address] is needed. I would be glad to see the patient back for follow-up at any time in the future if the clinical situation changes.       Mouna Ramos, CORNELIO-BC   . Luca Borges [facility] [place] [address]  [facility], [address] [place] [address] - [address] [phone]

## 2021-12-09 LAB — HCV RNA SERPL QL NAA+PROBE: NEGATIVE

## 2021-12-13 ENCOUNTER — HOSPITAL ENCOUNTER (OUTPATIENT)
Dept: PHYSICAL THERAPY | Age: 41
Discharge: HOME | End: 2021-12-13
Payer: COMMERCIAL

## 2021-12-13 PROCEDURE — 97162 PT EVAL MOD COMPLEX 30 MIN: CPT

## 2021-12-13 NOTE — PROGRESS NOTES
[address] [place] [address] PHYSICAL THERAPY AT [facility] [address] [facility] [address] - Phone: [phone]  Fax: [phone] / [address]  Patient Name: Gonzalo Goetz : 1980   Medical   Diagnosis: Foot drop, right [M21.371]  Foot drop, left [M21.372] Treatment Diagnosis: Bilateral leg compartment syndromes; s/p bilateral fasciotomies; bilateral foot drop. Onset Date: 2021     Referral Source: Edilson Mir NP Start of Care [facility]): 2021   Prior Hospitalization: See medical history Provider #: [id number]   Prior Level of Function: Unrestricted community mobility without AD use, driving, walking, ADLs, chores, work, running, and swimming. Comorbidities: Depression; Alcohol use; Tobacco use. Medications: Verified on Patient Summary List     The Plan of Care and following information is based on the information from the initial evaluation.   ==================================================================================  Assessment / key information:  Gonzalo Goetz is a 46 y.o. male with Dx of Foot drop, right [M21.371]  Foot drop, left [M21.372] due to bilateral leg compartment syndrome and s/p fasicotomies. He currently rates his pain as 3/10 today, most days constant, but today intermittent, and primarily located at arches and tops of feet and worst about ankle joints and describes \"feels like biting (like ants), occasional shooting pains in the foot\" (feet). He complains of difficulty and increased pain with--Not Reported, likely standing and walking. Today in PT, patient relates injury due to being passed out on 21 and required surgery for removal of muscles in his bilateral legs. Pt had a total of six surgeries on his legs between  and 21--long incision scars visible bilateral medial and lateral legs and lateral distal thighs.   Pt reports being in an inpatient rehab unit for about weeks after his surgeries and discharged to home 10/19/21. Pt had home PT, which ended a few weeks ago. Pt now ambulates with RW and wearing bilat AFOs. Pt reports pain in his ankles and feet, but denies pain or problems with his knees, thighs, or hips areas; bilateral UEs are OK. Pt has stairs to get to his apartment (currently staying with his mother) and he can go up and down them reciprocally with 1 railing for assistance. Pt has not resumed driving, but previously his vehicle had automatic transmission. Pt used to run and swim on alternate days prior to his injuries. Pt currently doing standing leg raises (hip flex, hip aBd) and squats at walker. 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 Pt has 2 cats at home. Pt is now able to dress, [employer] some household chores, meal prep. Pt reports some difficulty with sleeping due to his LE pain. Objective Findings:      Functional Mobility:  Sit <==> stand = uses hands on chair armrests to arise from chair and to less so to control descent into chair; Not Tested = SLS; Squating/Bending to retrieve; or Heel Raises (ankle PF in SLS). Observation:  Moderate to severe swelling at L > R ankles > feet and up distal 3/4 of legs; surgical incisions well healed; some bruising evident L dorsal foot webspace between 1st/2nd toes and mildly over lateral MTP joints; possibly small areas of redness/blister at bilat posterior heels. Gait:  Pt uses RW, slight steppage pattern and mild foot drop at heel strike bilaterally; inconsistent foot placements with longer strides, so occasionally stepping onto walker wheels (internal to front legs of walker). .  Ankle AROM (seated):  INV = 30 deg R and 10 deg L; EV = -10 deg R and neutral L; PF = grossly WFL; and DF = trace movement. LE PROM:  Hallux DF (seated) = grossly WFL and OK bilat; Toes #2-5 D (seated)F = grossly WFL and OK bilat; Knee Ext (supine) = full and OK bilat; Knee Flex (@90 deg hip flex, supine) = full motion and OK bilat; Hip Flex (supine KTC) = full motion and OK bilat; Hip ER (@90 deg hip flex, supine) = about 75 deg and OK bilat; Hip IR (@90 deg hip flex, supine) = WFL and OK bilat; Hip ABd and Ext = Not Tested/To Be Assessed. LE Flexibility:  Hamstrings (knee ext PROM @ 90 deg hip flex, supine) = about -50 deg and OK bilat; Gastroc (supine0 = about -5 deg bilat; Not Tested:  Iliopsoas; Piriformis; ITB; Hip Adductors; Quadriceps; Soleus. Manual Muscle Testing (isometric hold in mid-range and/or AROM anti-gravity): Ankle PF (seated) >/= 4/5 bilat, R > L; Ankle INV (seated) = trace L and < 2/5 R; Ankle EV (seated) = Absent/Trace motion bilat, but takes resistance; Ankle DF (seated) = Absent bilat; Toe PF (seated) = Trace bilat; Toe DF (seated) = Absent bilat; Knee Ext (seated) = 5/5 and OK bilat; Knee Flex (seated) = 5/5 and OK bilat; Hip ER (seated) = 5/5 and OK bilat; Hip IR (seated) = 5/5 and OK bilat; Hip Flex (reclined sitting) = WFL and OK bilat; Not Tested/TBA = Hip ABd, Hip Add, and Hip Ext. Palpation:  No tenderness bilat legs or thighs, ankles or knees; pt reports decreased sensation bilat toes and feet and distal legs. Pt instructed in HEP and will f/u in clinic for PT.  ======================================================= ===================================  Eval Complexity:  History:  HIGH Complexity :3+ comorbidities / personal factors will impact the outcome/ POC ;  Examination:  MEDIUM Complexity : 3 Standardized tests and measures addressing body structure, function, activity limitation and / or participation in recreation ; Presentation:  MEDIUM Complexity : Evolving with changing characteristics ;   Decision Making:  HIGH Complexity : FOTO score of 1- 25 ; Overall Complexity:  MEDIUM. Problem List:  pain affecting function, decrease ROM, decrease strength, edema affecting function, impaired gait/ balance, decrease ADL/ functional abilitiies, decrease activity tolerance, decrease flexibility/ joint mobility and decrease transfer abilities. Treatment Plan may include any combination of the following:  Therapeutic exercise, Therapeutic activities, Neuromuscular re-education, Physical agent/modality, Gait/balance training, Manual therapy, Patient education, Self Care training, Functional mobility training, Home safety training and Stair training. Patient / Family readiness to learn indicated by:  asking questions, trying to perform skills and interest.  Persons(s) to be included in education:  Patient (P). Barriers to Learning/Limitations:  None. Measures taken, if barriers to learning:    Patient Goal (s): \"Obtain 100% independence, be able to walk/run and swim again very soon. \"     Rehabilitation Potential:  Fair.  Short Term Goals: To be accomplished in  4-6  Weeks:    1. Independent with HEP for strength, ROM and functional mobility to improve independence with LE ADLs. 2. Decrease max pain by >/= 2-3 points to assist with tolerance for standing and walking, chores and ADLs. 3. Increase bilat gastroc flexibility by >/= 5 to 10 deg and bilat HS flexibility by >/= 10-15 degrees. 4. Consistent stepping pattern with use of RW to avoid crossing or stepping on feet or walker legs.  Long Term Goals: To be accomplished in  8-12 weeks:    1. Decrease max pain by >/= 75 to 80% to assist with standing and walking, chores and ADLs. 2.  Increase FOTO by >/= 30 pts to show functional improvement. 3.  Will rate a +5 on Global Rating of Change and be prepared to DC to HEP to maximize independence. 4.  Increase bilat gastroc and soleus flexibility to >/= 10-15 degrees and bilat HS flexibility by >/= 20 degrees.   5. Increase SLS balance with AFO to >/= 10 seconds L and R.  6. Increase Kelly Balance Scale and Timed Get Up and Go testing to no/minimal falls risk. Frequency / Duration:  Patient to be seen  2-3  times per week for 4-6 weeks. Patient / Caregiver education and instruction:  self care and exercises. G-Codes (GP):  NA. Therapist Signature: Chris Kingsley PT Date: 94/82/0875   Certification Period: NA Time: 12:37 PM   ===========================================================================================  I certify that the above Physical Therapy Services are being furnished while the patient is under my care. I agree with the treatment plan and certify that this therapy is necessary. Physician Signature:        Date:       Time:     Lynder Leventhal, NP      Please sign and return to [facility] or you may fax the signed copy to [phone]. Thank you.

## 2021-12-13 NOTE — PROGRESS NOTES
PHYSICAL THERAPY - DAILY TREATMENT NOTE     Patient Name: Bernardo Resendiz        Date: 2021  : 1980   YES Patient  Verified  Visit #:     Insurance: Payor: Dc Sanders / Plan: [address] / Product Type: Managed Care Medicaid /      In time: 11:21 AM Out time: 12:25 PM   Total Treatment Time: 64     Medicare/BCBS Time Tracking (below)   Total Timed Codes (min):  NA 1:1 Treatment Time:  NA     TREATMENT AREA =  Foot drop, right [M21.371]  Foot drop, left [M21.372]    SUBJECTIVE    Pain Level (on 0 to 10 scale):  3  / 10   Medication Changes/New allergies or changes in medical history, any new surgeries or procedures? NO    If yes, update Summary List   Subjective Functional Status/Changes:  []  No changes reported     See POC         OBJECTIVE    Billed With/As:   [x] TE   [x] TA   [x] Neuro   [x] Self Care Patient Education: [x] Review HEP    [x] Progressed/Changed HEP based on:   [x] positioning   [x] body mechanics   [] transfers   [] heat/ice application    [x] other:  Patient instructed in and briefly performed activities for HEP; patient given handouts with pictures and written directions for HEP and copies placed in patient's chart. PT/patient discussed possible future management of distal LE sweling with compression stockings with MD Rx (consult with vascular doctor) and possible consult with Physiatry for resources related to disability filing. Other Objective/Functional Measures:    See POC     Post Treatment Pain Level (on 0 to 10) scale:   ? / 10--Not Reassessed.      ASSESSMENT    Assessment/Changes in Function:     See POC     []  See Progress Note/Recertification   Patient will continue to benefit from skilled PT services to modify and progress therapeutic interventions, address functional mobility deficits, address ROM deficits, address strength deficits, analyze and address soft tissue restrictions, analyze and cue movement patterns, analyze and modify body mechanics/ergonomics, assess and modify postural abnormalities and instruct in home and community integration to attain remaining goals.       Progress toward goals / Updated goals:    See POC       PLAN    [x]  Upgrade activities as tolerated YES Continue plan of care   []  Discharge due to :    []  Other:      Therapist: Annalise Rivero PT    Date: 12/13/2021 Time: 12:38 PM     Future Appointments   Date Time Provider Myrna Taveras   12/16/2021 10:30 AM Hermenia Lipoma, PT [facility]   12/20/2021  9:00 AM Hermenia Lipoma, PT [facility]   12/23/2021  8:30 AM Velta Irving, PTA [facility]   12/27/2021  8:15 AM Rosaline Montemayor, PTA [facility]   12/30/2021 10:45 AM Klesie Ollie [facility]   1/3/2022  9:00 AM Hermenia Lipoma, PT [facility]   1/6/2022  8:30 AM Velta Irving, PTA [facility]   1/10/2022  9:00 AM Hermenia Lipoma, PT [facility]   1/13/2022  8:30 AM Velta Irving, PTA [facility]   1/17/2022  9:00 AM Hermenia Lipoma, PT [facility]   1/20/2022  8:30 AM Velta Irving, PTA [facility]

## 2021-12-16 ENCOUNTER — APPOINTMENT (OUTPATIENT)
Dept: PHYSICAL THERAPY | Age: 41
End: 2021-12-16
Payer: COMMERCIAL

## 2021-12-20 ENCOUNTER — APPOINTMENT (OUTPATIENT)
Dept: PHYSICAL THERAPY | Age: 41
End: 2021-12-20
Payer: COMMERCIAL

## 2021-12-23 ENCOUNTER — APPOINTMENT (OUTPATIENT)
Dept: PHYSICAL THERAPY | Age: 41
End: 2021-12-23
Payer: COMMERCIAL

## 2021-12-27 ENCOUNTER — APPOINTMENT (OUTPATIENT)
Dept: PHYSICAL THERAPY | Age: 41
End: 2021-12-27
Payer: COMMERCIAL

## 2021-12-30 ENCOUNTER — APPOINTMENT (OUTPATIENT)
Dept: PHYSICAL THERAPY | Age: 41
End: 2021-12-30
Payer: COMMERCIAL

## 2022-01-03 ENCOUNTER — APPOINTMENT (OUTPATIENT)
Dept: PHYSICAL THERAPY | Age: 42
End: 2022-01-03

## 2022-01-06 ENCOUNTER — APPOINTMENT (OUTPATIENT)
Dept: PHYSICAL THERAPY | Age: 42
End: 2022-01-06

## 2022-01-10 ENCOUNTER — APPOINTMENT (OUTPATIENT)
Dept: PHYSICAL THERAPY | Age: 42
End: 2022-01-10

## 2022-01-13 ENCOUNTER — APPOINTMENT (OUTPATIENT)
Dept: PHYSICAL THERAPY | Age: 42
End: 2022-01-13

## 2022-01-17 ENCOUNTER — APPOINTMENT (OUTPATIENT)
Dept: PHYSICAL THERAPY | Age: 42
End: 2022-01-17

## 2022-01-20 ENCOUNTER — APPOINTMENT (OUTPATIENT)
Dept: PHYSICAL THERAPY | Age: 42
End: 2022-01-20

## 2022-02-14 ENCOUNTER — HOSPITAL ENCOUNTER (OUTPATIENT)
Dept: PHYSICAL THERAPY | Age: 42
Discharge: HOME | End: 2022-02-14
Payer: COMMERCIAL

## 2022-02-14 PROCEDURE — 97161 PT EVAL LOW COMPLEX 20 MIN: CPT

## 2022-02-14 NOTE — PROGRESS NOTES
[address] [facility] PHYSICAL THERAPY AT [facility] [address] [facility] [address] - Phone: [phone]  Fax: [phone] / [address] [facility]  Patient Name: Heriberto Carter : 1980   Treatment   Diagnosis: B ankle foot drop, balance impairments, B Leg weakness Medical Diagnosis: Other abnormalities of gait and mobility [R26.89]  Other symptoms and signs involving the musculoskeletal system [R29.898]   Onset Date: 2021     Referral Source: Adrianna Arnold MD [facility]): 2022   Prior Hospitalization: See medical history Provider #: [id number]   Prior Level of Function: Pt was pain free and independent with ADLs   Comorbidities: None reported   Medications: Verified on Patient Summary List   The Plan of Care and following information is based on the information from the initial evaluation.   ===========================================================================================  Assessment / key information:  Pt is a 46yo male that presents to PT with chief complaint of decreased balance and strength in his B LEs. Pt was hospitalized and had several fasciotomies (6) over the course of a few weeks in early  after passing out for an unknown amount of time prior to rescue teams finding him. Pt went through course of acute inpatient rehab. Pt reports not currently working and is having difficulty with all weightbearing and walking tasks due to weakness and imbalance. He/she presents with pain ranging from 3/10, located in the anterior ankles and describes it as burning. Gait: Pt ambulates with SPC not AFOs any more but patient has visible B foot drop. Ankle AROM R/L: PF WNL/WNL, Inv 30deg EV 20deg PROM DF R/L neutral/10deg AROM DF was unable to get good contraction B. LE MMT: hip flex 4/5, abd 3+/5, add 4-/5, ext 4/5, knee flex 4/5, ankle DF trace , PF 4/5, Inv 3-/5, Ever 3-/5. SLS EO DNA. Pt demonstrates overall decreased active mobility and strength decreasing pts ability to safely perform ADLs. Patient scored 50 on FOTO indicating decreased functional activity level and QOL Pt will benefit from PT interventions to address the aforementioned deficits and allow pt to return to PLOF. Eval Complexity: History MEDIUM  Complexity : 1-2 comorbidities / personal factors will impact the outcome/ POC ;  Examination  LOW Complexity : 1-2 Standardized tests and measures addressing body structure, function, activity limitation and / or participation in recreation ; Presentation LOW Complexity : Stable, uncomplicated ;  Decision Making MEDIUM Complexity : FOTO score of 26-74; Overall Complexity LOW   ===========================================================================================  Problem List: pain affecting function, decrease ROM, decrease strength, impaired gait/ balance, decrease ADL/ functional abilitiies, decrease activity tolerance and decrease flexibility/ joint mobility   Treatment Plan may include any combination of the following: Therapeutic exercise, Therapeutic activities, Neuromuscular re-education, Gait/balance training, Manual therapy, Patient education, Self Care training and Functional mobility training  Patient / Family readiness to learn indicated by: asking questions, trying to perform skills and interest  Persons(s) to be included in education: patient (P)  Barriers to Learning/Limitations: no  Measures taken, if barriers to learning:    Patient Goal (s): \"mobility\"   Patient self reported health status: good  Rehabilitation Potential: fair   Short Term Goals: To be accomplished in  3  weeks:  1. Pt will be independent and compliant with HEP to decrease pain, increase ROM and return pt to PLOF. 2. Pt will demonstrate a GROC score of >/= +2 to show overall improvement in function      Long Term Goals: To be accomplished in  6  weeks:  1.  Increase score on FOTO to > or = to 60 points to demo an increase in functional activity tolerance with the LE. 2. Pt will note < or = 2/10 pain with all mobility to improve comfort with ADLs. 3. Pt will demonstrate a GROC score of >/= +5 to show overall improvement in function  Frequency / Duration:   Patient to be seen  2-3  times per week for 6-8  weeks:  Patient / Caregiver education and instruction: self care, activity modification and exercises    Therapist Signature: Nelda Loyd PT Date: 0/44/5209   Certification Period: - Time: 2:46 PM   ===========================================================================================  I certify that the above Physical Therapy Services are being furnished while the patient is under my care. I agree with the treatment plan and certify that this therapy is necessary. Physician Signature:        Date:       Time:         Vela Babinski, MD    Please sign and return to [facility] [place] or you may fax the signed copy to [phone]. Thank you.

## 2022-02-17 ENCOUNTER — APPOINTMENT (OUTPATIENT)
Dept: PHYSICAL THERAPY | Age: 42
End: 2022-02-17
Payer: COMMERCIAL

## 2022-02-21 ENCOUNTER — HOSPITAL ENCOUNTER (OUTPATIENT)
Dept: PHYSICAL THERAPY | Age: 42
End: 2022-02-21
Payer: COMMERCIAL

## 2022-02-24 ENCOUNTER — APPOINTMENT (OUTPATIENT)
Dept: PHYSICAL THERAPY | Age: 42
End: 2022-02-24
Payer: COMMERCIAL

## 2022-02-25 ENCOUNTER — APPOINTMENT (OUTPATIENT)
Dept: PHYSICAL THERAPY | Age: 42
End: 2022-02-25
Payer: COMMERCIAL

## 2022-02-28 ENCOUNTER — HOSPITAL ENCOUNTER (OUTPATIENT)
Dept: PHYSICAL THERAPY | Age: 42
Discharge: HOME | End: 2022-02-28
Payer: COMMERCIAL

## 2022-02-28 PROCEDURE — 97110 THERAPEUTIC EXERCISES: CPT

## 2022-02-28 PROCEDURE — 97530 THERAPEUTIC ACTIVITIES: CPT

## 2022-02-28 PROCEDURE — 97112 NEUROMUSCULAR REEDUCATION: CPT

## 2022-02-28 NOTE — PROGRESS NOTES
PHYSICAL THERAPY - DAILY TREATMENT NOTE     Patient Name: Tami Bal        Date: 2022  : 1980   YES Patient  Verified  Visit #:   2   of   18  Insurance: Payor: [email or web address] / Plan: [address] / Product Type: Managed Care Medicaid /      In time: 9:00 AM Out time: 10:00 AM   Total Treatment Time: 60 min. Medicare/[employer] Time Tracking (below)   Total Timed Codes (min):  NA 1:1 Treatment Time:  NA     TREATMENT AREA =  Other abnormalities of gait and mobility [R26.89]  Other symptoms and signs involving the musculoskeletal system [R29.898]    SUBJECTIVE    Pain Level (on 0 to 10 scale):  ? / 10--Not Assessed. Medication Changes/New allergies or changes in medical history, any new surgeries or procedures? NO    If yes, update Summary List   Subjective Functional Status/Changes:  []  No changes reported       No c/o pain upon arrival at PT. Pt ambulating with SBQC and wearing bilat AFOs; not reports of falling. OBJECTIVE  Modalities Rationale:     decrease edema, decrease inflammation, decrease pain and increase tissue extensibility to improve patient's ability to transfer, stand, walk/community mobility, ADLs, household chores, work, and recreation/fitness activities.    min [] Estim, type/location:                                      []  att     []  unatt     []  w/US     []  w/ice    []  w/heat    min []  Mechanical Traction: type/lbs                   []  pro   []  sup   []  int   []  cont    []  before manual    []  after manual    min []  Ultrasound, settings/location:      min []  Iontophoresis w/ dexamethasone, location:                                               []  take home patch       []  in clinic    min []  Ice     []  Heat    location/position:     min []  Vasopneumatic Device, press/temp:    If using vaso (only need to measure limb vaso being performed on)      pre-treatment girth :       post-treatment girth :       measured at (landmark location) :      min []  Other:    [x] Skin assessment post-treatment (if applicable):    [x]  intact    []  redness- no adverse reaction                  []redness - adverse reaction:      20 min Therapeutic Exercise:  [x]  See flow sheet   Rationale:      increase ROM, increase strength and increase proprioception to improve the patients ability to transfer, stand, walk/community mobility, ADLs, household chores, work, and recreation/fitness activities. 20 min Therapeutic Activity: [x]  See flow sheet   Rationale:      increase ROM, increase strength, improve coordination, improve balance and increase proprioception to improve the patients ability to transfer, stand, walk/community mobility, ADLs, household chores, work, and recreation/fitness activities. 20 min Neuromuscular Re-ed: [x]  See flow sheet   Rationale:      increase strength, improve coordination, improve balance and increase proprioception to improve the patients ability to transfer, stand, walk/community mobility, ADLs, household chores, work, and recreation/fitness activities. 0 min Manual Therapy: Technique:      [] S/DTM []IASTM []PROM [] Passive Stretching   []manual TPR    []Jt manipulation:Gr I [] II []  III [] IV[] V[]  Treatment Area:     Rationale:      decrease pain, increase ROM, increase tissue extensibility, decrease edema , decrease trigger points and increase postural awareness to improve patient's ability to transfer, stand, walk/community mobility, ADLs, household chores, work, and recreation/fitness activities. The manual therapy interventions were performed at a separate and distinct time from the therapeutic activities interventions. 0 min Gait Training:  ___ feet with ___ device on level surfaces with ___ level of assistance   Rationale: To improve ambulation safety and efficiency in order to improve patient's ability to safely ambulate at home for self care.     0 min Self Care:    Rationale:    increase ROM, increase strength, improve coordination, improve balance and increase proprioception to improve the patients ability to transfer, stand, walk/community mobility, ADLs, household chores, work, and recreation/fitness activities. Billed With/As:   [] TE   [] TA   [] Neuro   [] Self Care Patient Education: [x] Review HEP    [] Progressed/Changed HEP based on:   [] positioning   [] body mechanics   [] transfers   [] heat/ice application    [] other:        Other Objective/Functional Measures:    Discomfort L posterior heel with bilat heel raises in standing due to shoe contact at site of blister. Some difficulty with recumbent biking due to LE weakness/decreased sensation allowing external rotation/toe out and causing medial heel to catch at pedal shaft. Post Treatment Pain Level (on 0 to 10) scale:   ? / 10--Not Assessed. ASSESSMENT    Assessment/Changes in Function:     Good performance of resisted exercises for LEs.     []  See Progress Note/Recertification   Patient will continue to benefit from skilled PT services to modify and progress therapeutic interventions, address functional mobility deficits, address ROM deficits, address strength deficits, analyze and address soft tissue restrictions, analyze and cue movement patterns, analyze and modify body mechanics/ergonomics, assess and modify postural abnormalities and instruct in home and community integration to attain remaining goals. Progress toward goals / Updated goals:    Good Progress to    [x] STG    [] LTG  1 as shown by observed performance to activities during today's PT session. PLAN    [x]  Upgrade activities as tolerated YES Continue plan of care   []  Discharge due to :    [x]  Other: Progress HEP; try traditional stationary bike.      Therapist: Félix Liriano, PT    Date: 2/28/2022 Time: 7:14 AM     Future Appointments   Date Time Provider Myrna Taveras   2/28/2022  9:00 AM Mary Patel, PT [facility]   3/3/2022 11:15 AM Mona Barreto NP [facility] AMB   3/4/2022  9:00 AM [facility] [address]   3/7/2022  9:45 AM [facility] [address]   3/10/2022  9:15 AM [facility] [address]   3/11/2022  9:00 AM [facility] [address]   3/17/2022  9:15 AM [facility] [address]   3/18/2022  9:00 AM [facility] [address]   3/21/2022  9:00 AM [facility] [address]   3/24/2022  9:15 AM [facility] [address]   3/25/2022  9:00 AM [facility] [address]   3/28/2022  9:00 AM [facility] [address]   3/31/2022  9:15 AM [facility] [address]   4/1/2022  9:00 AM [facility] [address]

## 2022-03-03 ENCOUNTER — OFFICE VISIT (OUTPATIENT)
Dept: NEUROLOGY | Age: 42
End: 2022-03-03
Payer: COMMERCIAL

## 2022-03-03 ENCOUNTER — HOSPITAL ENCOUNTER (OUTPATIENT)
Dept: LAB | Age: 42
Discharge: HOME | End: 2022-03-03
Payer: COMMERCIAL

## 2022-03-03 VITALS
BODY MASS INDEX: 29.54 KG/M2 | SYSTOLIC BLOOD PRESSURE: 118 MMHG | RESPIRATION RATE: 16 BRPM | WEIGHT: 255.6 LBS | DIASTOLIC BLOOD PRESSURE: 74 MMHG | HEART RATE: 98 BPM | OXYGEN SATURATION: 96 %

## 2022-03-03 DIAGNOSIS — R41.9 COGNITIVE COMPLAINTS: Primary | ICD-10-CM

## 2022-03-03 DIAGNOSIS — R41.3 MEMORY LOSS: ICD-10-CM

## 2022-03-03 LAB
FOLATE SERPL-MCNC: 13.7 NG/ML (ref 3.1–17.5)
T4 FREE SERPL-MCNC: 0.7 NG/DL (ref 0.7–1.5)
TSH SERPL DL<=0.05 MIU/L-ACNC: 1.07 UIU/ML (ref 0.36–3.74)
VIT B12 SERPL-MCNC: 346 PG/ML (ref 211–911)

## 2022-03-03 PROCEDURE — 84439 ASSAY OF FREE THYROXINE: CPT

## 2022-03-03 PROCEDURE — 82607 VITAMIN B-12: CPT

## 2022-03-03 PROCEDURE — 36415 COLL VENOUS BLD VENIPUNCTURE: CPT

## 2022-03-03 PROCEDURE — 99204 OFFICE O/P NEW MOD 45 MIN: CPT | Performed by: NURSE PRACTITIONER

## 2022-03-03 RX ORDER — OLANZAPINE 5 MG/1
5 TABLET ORAL
COMMUNITY
Start: 2022-02-23 | End: 2022-08-11

## 2022-03-03 NOTE — PROGRESS NOTES
[facility]  [address], [address], [place], [address]  [address] Bonilla Higginbotham, [address]  Office:  [phone]  Fax: [phone]    Referring: Jovanny Cheney MD    Chief Complaint   Patient presents with    New Patient    Memory Loss       HPI:  Jacob Nguyen presents in new patient evaluation for memory impairment. He has history of recent heroin overdose in September 2021. He was admitted at [facility] at that time for overdose and had complications of acute hypoxic respiratory failure, bilateral lower extremity compartment syndrome, and memory loss. He is status post rhabomyolysis/ bilateral lower extremities compartment syndrome s/p four compartment fasciotomy in the setting of heroin overdose. He is in a methadone program.  He has history of depression and follows with psychiatry. He presents today in follow-up. He is with his mother. He provides history but she also contributes. He went to [address] [place] [address] for 2 weeks after the admission at [facility]. Had a short stay due to insurance. He was discharged to home with home health on October 19th. He lives with his mother and brother. He has short term memory problems since the hospitalization. It is getting better, but not much. For example, he will have to rewatch Netflix shows because he cannot remember what is going on. He did a lot of brain games with speech therapy at [address] [place] [address]. He had home speech therapy for 6-9 weeks. The heroin overdose was a suicide attempt. Reports recent diagnosis of bipolar syndrome in August.  He has history of drug use for years then was on methadone, then off methadone, had a relapse during Covid, then used heroin, then stopped prior, then afterwards he stopped because he was not feeling better so he started seeing doctors.   He was diagnosed with hep C, then when he started feeling better he started jumping into things but then when he was on medications for bipolar it interacted with the hepatitis C medications. So he got upset, sick, itchy, his mom reports he was manic. Took the news of being diagnosed with bipolar disorder hard. It made him very upset. His father has bipolar disorder. During the hospitalization, he was septic, was on antibiotics, had kidney failure. Was defibrillated twice. They report he had 6 surgeries in about 13 days. It was very touch-and-go there for a while. It was traumatic. He had excruciating pain. It was difficult to manage. He was on Suboxone. It felt nightmarish. Initially was not on opiates after surgery then pain management was straightened out. He is currently on Lexapro 10 mg daily, gabapentin 300 mg 3 times daily but takes it occasionally once a day sometimes. It may help mood but it may make it easier to forget things. He has been on Zyprexa since Sunday. He has a psychiatrist, [facility]. He was also on it in rehab. He can feel a difference when he is on it. His pain level is up and down. The methadone helps. Reports having some musculoskeletal pain. Some stabbing/shooting pains around the ankles. He goes to the methadone clinic. Treated for pain with primary care. Memory concerns include not initially recognizing his parents. He eventually did. He thought they lived in a different apartment which was the apartment that they lived in two apartments ago. His mother reports that he thought it was the '80s and he lived in [place], etc.  He knew they moved but didn't quite get it until they got there. When watching a movie he will forget what's going on in the movie and have to restart it midway through. He feels moods of others like negativity and become like that for a while. He gets agitated easily. Sounds throw him off. She says he has been very difficult, abusive. He can't really vocalize it and explain it. She says on their end, he did explain it over and over.   He uses reminders, phone alarms for medications or she helps. He has not been driving. 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 Reports the left is worse than the right- on the right he can dorsiflex at the ankle some. He has bilateral AFOs. He uses the cane for ambulation. He denies memory concerns prior to the hospitalization but his mother nods head yes. At that time she reports his skin was turning yellow, and he had itching. He knew he needed to go to hospital before it happened. This was for a few months. He also had bizarre behavior prior, was in [facility]. The hep C medication made him manic. A year ago, he was working at [employer] during the pandemic- it was physical, and he was in a workout phase at that time. He was in sales prior to that. He dropped out of 12th grade and has his GED. He gets frustrated. He can do ADLs. His balance is impaired. Denies incontinence. He has an intermittent tremor which started since being on Zyprexa. Reports it involves both hands and lip. Denies headaches. Denies hearing or vision problems. He wears glasses. Denies CP, SOB, fever, or chills. Endorses weight gain. He lost weight with hep C then gained weight. Denies hallucinations or delusions. Denies SI or HI. He had a CT of the head on 2021 when initially admitted, which was unremarkable. Endorses sleeping ok. He was on hemodialysis in the hospital and has been cleared by the nephrologist.  He is in outpatient PT at [facility]. Social history: Denies current drug use. He smokes vape, reports the equivalent of about a half PPD. Occasional alcohol use of 1-3 times a week, 2 beers at that time. Family history: Positive for bipolar syndrome in his father. Grandmother  of CHF. Maternal grandfather  of old age, had PSP.      Social History     Socioeconomic History    Marital status: SINGLE     Spouse name: Not on file    Number of children: Not on file    Years of education: Not on file    Highest education level: Not on file   Occupational History    Not on file   Tobacco Use    Smoking status: Former Smoker     Packs/day: 1.00     Years: 4.00     Pack years: 4.00     Types: Cigarettes    Smokeless tobacco: Never Used    Tobacco comment: pt does vape   Substance and Sexual Activity    Alcohol use: Not Currently     Comment: hx of excessive use.  Drug use: Not Currently    Sexual activity: Not on file   Other Topics Concern    Not on file   Social History Narrative    Not on file     Social Determinants of Health     Financial Resource Strain:     Difficulty of Paying Living Expenses: Not on file   Food Insecurity:     Worried About Running Out of Food in the Last Year: Not on file    Christiano of Food in the Last Year: Not on file   Transportation Needs:     Lack of Transportation (Medical): Not on file    Lack of Transportation (Non-Medical): Not on file   Physical Activity:     Days of Exercise per Week: Not on file    Minutes of Exercise per Session: Not on file   Stress:     Feeling of Stress : Not on file   Social Connections:     Frequency of Communication with Friends and Family: Not on file    Frequency of Social Gatherings with Friends and Family: Not on file    Attends Congregational Services: Not on file    Active Member of [address] [place] [address] [employer] or Organizations: Not on file    Attends Club or Organization Meetings: Not on file    Marital Status: Not on file   Intimate Partner Violence:     Fear of Current or Ex-Partner: Not on file    Emotionally Abused: Not on file    Physically Abused: Not on file    Sexually Abused: Not on file   Housing Stability:     Unable to Pay for Housing in the Last Year: Not on file    Number of [place] in the Last Year: Not on file    Unstable Housing in the Last Year: Not on file       History reviewed. No pertinent family history.     Current Outpatient Medications   Medication Sig Dispense Refill  methadone HCl (METHADONE, BULK,) by Does Not Apply route.  gabapentin (NEURONTIN) 300 mg capsule Take 300 mg by mouth three (3) times daily.  naproxen sodium (Aleve) 220 mg tablet Take 220 mg by mouth two (2) times daily (with meals).  escitalopram oxalate (LEXAPRO) 10 mg tablet Take 10 mg by mouth daily.  OLANZapine (ZyPREXA) 5 mg tablet Take 5 mg by mouth nightly. Past Medical History:   Diagnosis Date    GERD (gastroesophageal reflux disease)        Past Surgical History:   Procedure Laterality Date    HX ORTHOPAEDIC      SURGERY       No Known Allergies    Patient Active Problem List   Diagnosis Code    Chronic hepatitis C ([facility] B18.2    H/O intravenous drug use in remission Z87.898         Review of Systems:   Constitutional: no fever or chills  Skin denies rash or itching  HEENT:  Denies tinnitus, hearing loss, or visual changes  Respiratory: denies shortness of breath  Cardiovascular: denies chest pain, dyspnea on exertion  Gastrointestinal: does not report nausea or vomiting  Genitourinary: does not report dysuria or incontinence  Musculoskeletal: does not report joint pain or swelling  Endocrine: denies weight change  Hematology: denies easy bruising or bleeding   Neurological: as above in HPI      PHYSICAL EXAMINATION:      VITAL SIGNS:    Visit Vitals  /74   Pulse 98   Resp 16   Wt 115.9 kg (255 lb 9.6 oz)   SpO2 96%   BMI 29.54 kg/m²       GENERAL: Well developed, well nourished, in no apparent distress. HEART: RR, no murmurs heard, no carotid bruits. LUNGS:                      Respirations regular and unlabored. EXTREMITIES: +bilateral lower extremities edema. Pulses 2+    and symmetrical. Bilateral AFOs. Healed incisions to the bilateral legs. HEAD:   Normocephalic, atraumatic. Wearing glasses. NEUROLOGIC EXAMINATION    MENTAL STATUS: Awake, alert, and oriented x 4 but initially stated February then March. Did not state the date.  Attention and STM are grossly normal. There is no aphasia. Fund of knowledge is adequate. Mood and affect are appropriate. His mother also helps provide input for history. He scored 27 out of 30 on the MMSE. Could not say the date and initially said February for the month then corrected himself to March. He did not have the pentagons connected for design-copying. CRANIAL NERVES: Visual fields are full to confrontation. Pupils are reactive to light and accommodation. Extraocular movements are intact and there is no nystagmus. Facial sensation is normal.  Face is symmetrical.   Hearing is grossly intact. SCM/TPZ 5/5. Palate rises symmetrically. Tongue is in the midline. MOTOR:  Normal tone, bulk, and strength, 5/5 muscle strength throughout except foot drop bilaterally. Does not dorsiflex on left foot right foot ankle dorsiflexion of approximately 4/5 strength. Fine finger movements symmetrical. Possible subtle left upper extremity drift. No cogwheel rigidity present. Mild BUE action tremor. CEREBELLAR: Finger to nose was normal.   No dysmetria. SENSORY: PP reduced distal BLE- absent in the feet to lower legs. Too much edema of the BLE to assess vibration to the distal BLE. JPS intact. DTRs:  Brisk and symmetrical. Toes downgoing. GAIT:   Ambulates with cane.              CBC:   Lab Results   Component Value Date/Time    WBC 6.3 12/06/2021 02:13 PM    RBC 4.93 12/06/2021 02:13 PM    HGB 14.0 12/06/2021 02:13 PM    HCT 42.2 12/06/2021 02:13 PM    PLATELET 929 59/33/5298 02:13 PM     BMP:   Lab Results   Component Value Date/Time    Glucose 78 12/06/2021 02:13 PM    Sodium 138 12/06/2021 02:13 PM    Potassium 4.0 12/06/2021 02:13 PM    Chloride 103 12/06/2021 02:13 PM    CO2 27 12/06/2021 02:13 PM    BUN 14 12/06/2021 02:13 PM    Creatinine 0.77 12/06/2021 02:13 PM    Calcium 9.0 12/06/2021 02:13 PM     CMP:   Lab Results   Component Value Date/Time    Glucose 78 12/06/2021 02:13 PM    Sodium 138 12/06/2021 02:13 PM    Potassium 4.0 12/06/2021 02:13 PM    Chloride 103 12/06/2021 02:13 PM    CO2 27 12/06/2021 02:13 PM    BUN 14 12/06/2021 02:13 PM    Creatinine 0.77 12/06/2021 02:13 PM    Calcium 9.0 12/06/2021 02:13 PM    Anion gap 8 12/06/2021 02:13 PM    BUN/Creatinine ratio 18 12/06/2021 02:13 PM    Alk. phosphatase 56 12/06/2021 02:13 PM    Protein, total 7.7 12/06/2021 02:13 PM    Albumin 4.2 12/06/2021 02:13 PM    Globulin 3.5 12/06/2021 02:13 PM    A-G Ratio 1.2 12/06/2021 02:13 PM          Impression/Plan: This is a 79-year-old male who presents in evaluation for memory loss status post encephalopathic episode when he was hospitalized after heroin overdose. This was complicated by acute respiratory failure, cardiac arrest, renal failure, and bilateral lower extremities compartment syndrome status post multiple surgeries. Prior to this he had history of recent diagnosis of bipolar disorder and history of chronic HCV. After the hospitalization he was in the rehab facility for 2 weeks and then was discharged with home health, now currently in outpatient physical therapy. He had a CT of the head at the initial onset of the hospitalization which was unremarkable. Will obtain MRI of the brain. Labs for vitamin B12 level and thyroid function. EEG. Recommended neuropsychological evaluation for further evaluation into cognitive complaints. Recommended outpatient speech therapy for cognition. He is interested in proceeding with the plan. Follow up after neuropsych evaluation. Diagnoses and all orders for this visit:    1. Cognitive complaints  -     REFERRAL TO NEUROPSYCHOLOGY  -     MRI BRAIN WO CONT; Future  -     TSH AND FREE T4; Future  -     VITAMIN B12 & FOLATE; Future  -     REFERRAL TO SPEECH THERAPY    2. Memory loss  -     REFERRAL TO NEUROPSYCHOLOGY  -     MRI BRAIN WO CONT; Future  -     TSH AND FREE T4; Future  -     VITAMIN B12 & FOLATE;  Future  -     REFERRAL TO SPEECH THERAPY        I spent 55 minutes with the patient in face-to-face consultation, with 30 minutes spent in counseling and coordination of care as described above. Signed By: Jose Casas NP              PLEASE NOTE:   Portions of this document may have been produced using voice recognition software. Unrecognized errors in transcription may be present.

## 2022-03-03 NOTE — PROGRESS NOTES
Alfredo Rolon is a 38 y.o. male who is in the office today as a New Patient. 1. Have you been to the ER, urgent care clinic since your last visit? Hospitalized since your last visit? No    2. Have you seen or consulted any other health care providers outside of the [address] [place] [address] since your last visit? Include any pap smears or colon screening.  No

## 2022-03-04 ENCOUNTER — HOSPITAL ENCOUNTER (OUTPATIENT)
Dept: PHYSICAL THERAPY | Age: 42
Discharge: HOME | End: 2022-03-04
Payer: COMMERCIAL

## 2022-03-04 DIAGNOSIS — R41.9 COGNITIVE COMPLAINTS: ICD-10-CM

## 2022-03-04 DIAGNOSIS — R41.3 MEMORY LOSS: ICD-10-CM

## 2022-03-04 PROCEDURE — 97112 NEUROMUSCULAR REEDUCATION: CPT

## 2022-03-04 PROCEDURE — 97535 SELF CARE MNGMENT TRAINING: CPT

## 2022-03-04 PROCEDURE — 97110 THERAPEUTIC EXERCISES: CPT

## 2022-03-04 NOTE — PROGRESS NOTES
PHYSICAL THERAPY - DAILY TREATMENT NOTE     Patient Name: Tami Bal        Date: 3/4/2022  : 1980   YES Patient  Verified  Visit #:   3   of   18  Insurance: Payor: Dc 22 / Plan: [address] / Product Type: Managed Care Medicaid /      In time: 9:18  Out time: 10:06   Total Treatment Time: 48     Medicare/BCBS Time Tracking (below)   Total Timed Codes (min):  - 1:1 Treatment Time:  -     TREATMENT AREA =  Other abnormalities of gait and mobility [R26.89]  Other symptoms and signs involving the musculoskeletal system [R29.898]    SUBJECTIVE    Pain Level (on 0 to 10 scale):  0 / 10   Medication Changes/New allergies or changes in medical history, any new surgeries or procedures? NO    If yes, update Summary List   Subjective Functional Status/Changes:  []  No changes reported       Functional improvements: was walking around the neighborhood ~ 3x per week          OBJECTIVE  Modalities Rationale:   na  13 min Therapeutic Exercise:  [x]  See flow sheet   Rationale:      increase ROM and increase strength to improve the patients ability to perform functional ADLs          25 min Neuromuscular Re-ed: [x] sitting AAROM bilateral ankle DF/PF, INV , EV, supine PNF LE D1 patterns -AA-ARROM bilaterally, supine holding  SB 90/90 with rhythmic stabs   Rationale:      increase ROM and increase strength to improve the patients ability to perform functional ADLs         10 min Self Care: Pt education in positioning in supine without pressure on heels, self monitoring of open area on left heel    Rationale:    increase ROM and increase proprioception to improve the patients ability to perform functional ADLs    Billed With/As:   [] TE   [] TA   [] Neuro   [] Self Care Patient Education: [x] Review HEP    [] Progressed/Changed HEP based on:   [] positioning   [] body mechanics   [] transfers   [] heat/ice application    [] other:        Other Objective/Functional Measures:     Add standing 3 way SLR in // bars, standing ham curl, side stepping right /left in // bars     Post Treatment Pain Level (on 0 to 10) scale:   0  / 10     ASSESSMENT    Assessment/Changes in Function:   Pt requiring 1-2 step cues for sequencing during exercises for proper form. AAROM during all ankle exercise for facilitation of AROM DF bilaterally  Pt education in self monitoring blister on  left posterior ankle- distal Achilles. Scar is open with mild depth. Recommended pt to follow up with MD if open area persists, pt education in foot care and positioning. CGA/SBA  for all dynamic gait exercise   []  See Progress Note/Recertification   Patient will continue to benefit from skilled PT services to modify and progress therapeutic interventions, address functional mobility deficits, address ROM deficits, address strength deficits, analyze and address soft tissue restrictions, analyze and cue movement patterns, assess and modify postural abnormalities and instruct in home and community integration to attain remaining goals.       Progress toward goals / Updated goals:    Fair Progress to    [x] STG    [] LTG  1 as shown by current HEP in progress       PLAN    [x]  Upgrade activities as tolerated YES Continue plan of care   []  Discharge due to :    []  Other:      Therapist: Emmanuel De Jesus PTA    Date: 3/4/2022 Time: 10:06 AM     Future Appointments   Date Time Provider Myrna Taveras   3/7/2022  9:45 AM Amadeo Oquendo PTA [facility]   3/10/2022  9:15 AM Carla Expose [facility]   3/11/2022  9:00 AM Amadeo Oquendo PTA [facility]   3/17/2022  9:15 AM Carla Expose [facility]   3/18/2022  9:00 AM Amadeo Oquendo PTA [facility]   3/21/2022  9:00 AM Amadeo Oquendo PTA [facility]   3/24/2022  9:15 AM Amadeo Oquendo PTA [facility]   3/25/2022  9:00 AM Bebo Ramos PT [facility]   3/28/2022  9:00 AM Amadeo Oquendo PTA [facility]   3/31/2022  9:15 AM Davina Guillaume [facility]   4/1/2022  9:00 SUSAN Velasquez, PTA [facility]

## 2022-03-07 ENCOUNTER — HOSPITAL ENCOUNTER (OUTPATIENT)
Dept: PHYSICAL THERAPY | Age: 42
Discharge: HOME | End: 2022-03-07
Payer: COMMERCIAL

## 2022-03-07 ENCOUNTER — TELEPHONE (OUTPATIENT)
Dept: PHYSICAL THERAPY | Age: 42
End: 2022-03-07

## 2022-03-07 PROCEDURE — 97530 THERAPEUTIC ACTIVITIES: CPT

## 2022-03-07 PROCEDURE — 97535 SELF CARE MNGMENT TRAINING: CPT

## 2022-03-07 PROCEDURE — 97110 THERAPEUTIC EXERCISES: CPT

## 2022-03-07 PROCEDURE — 97112 NEUROMUSCULAR REEDUCATION: CPT

## 2022-03-07 NOTE — PROGRESS NOTES
PHYSICAL THERAPY - DAILY TREATMENT NOTE     Patient Name: Jacob Nguyen        Date: 3/7/2022  : 1980   YES Patient  Verified  Visit #:      of   18  Insurance: Payor: Dc  / Plan: [address] / Product Type: Managed Care Medicaid /      In time: 9:52 Out time: 10:41   Total Treatment Time: 49     Medicare/BCBS Time Tracking (below)   Total Timed Codes (min):  - 1:1 Treatment Time:  -     TREATMENT AREA =  Other abnormalities of gait and mobility [R26.89]  Other symptoms and signs involving the musculoskeletal system [R29.898]    SUBJECTIVE    Pain Level (on 0 to 10 scale): 0  / 10   Medication Changes/New allergies or changes in medical history, any new surgeries or procedures? NO    If yes, update Summary List   Subjective Functional Status/Changes:  []  No changes reported       Pt reports walking around house most of the time without braces.  He has been trying not to rest as much in bed         OBJECTIVE    15 min Therapeutic Exercise:  [x]  See flow sheet   Rationale:      increase ROM, increase strength, improve coordination, improve balance and increase proprioception to improve the patients ability to perform functional ADLs     8 min Therapeutic Activity: [x]  Gait with SBQC ~ 2 laps around clinic with SBA; VCs for step length; sit to stand body mechanics without braces in shoes; amb ~ 100 ft x 2 with SBQC and CG/SBA  Without braces   Rationale:      increase ROM, improve coordination, improve balance and increase proprioception to improve the patients ability to decrease fall risk in community ambulation    16 min Neuromuscular Re-ed: [x]  Sitting AAROM Bilateral DF/PF, INV, EV ; supine AA-ARROM D1/D2 patterans   Rationale:      increase strength, improve coordination, improve balance and increase proprioception to improve the patients ability to improve NMR and AROM in LE for ADLs         10 min Billed With/As:   [] TE   [] TA   [] Neuro   [] Self Care Patient Education: [x] Review HEP , safety in sit to stand and slow progression into home walking program as tolerated; review self monitoring of heels, recommended pt to consult MD prior to swimming in pool; swimming not recommended at this time. [] Progressed/Changed HEP based on:   [] positioning   [] body mechanics   [] transfers   [] heat/ice application    [] other:        Other Objective/Functional Measures:  Resume bike without braces; advanced body mechanics for sit to stand, ambulation safety     Post Treatment Pain Level (on 0 to 10) scale:  0  / 10     ASSESSMENT    Assessment/Changes in Function:     Pt requires Close S with Occasional CGA for safety in static balance and ambulation      []  See Progress Note/Recertification   Patient will continue to benefit from skilled PT services to modify and progress therapeutic interventions, address functional mobility deficits, address ROM deficits, address strength deficits, analyze and address soft tissue restrictions, analyze and cue movement patterns, analyze and modify body mechanics/ergonomics, assess and modify postural abnormalities, address imbalance/dizziness and instruct in home and community integration to attain remaining goals.       Progress toward goals / Updated goals:    Good Progress to    [x] STG    [] LTG  1 as shown by HEP currently in progress       PLAN    [x]  Upgrade activities as tolerated YES Continue plan of care   []  Discharge due to :    []  Other:      Therapist: Bernie Goode PTA    Date: 3/7/2022 Time: 10:41 AM     Future Appointments   Date Time Provider Myrna Taveras   3/10/2022  9:15 AM Roma Skene [facility]   3/11/2022  9:00 AM Andrea Cogan, PTA [facility]   3/14/2022 11:30 AM [facility] MRI RM 1 [facility]   3/17/2022  9:15 AM Roma Skene [facility]   3/18/2022  9:00 AM Andrea Cogan, PTA [facility]   3/21/2022  9:00 AM Andrea Cogan, PTA [facility]   3/24/2022  9:15 AM Andrea Cogan, [facility]   3/25/2022  9:00 AM Lois Adams PT [facility]   3/28/2022  9:00 AM Gray Chambers [facility]   3/31/2022  9:15 AM J Luis Kang [facility]   4/1/2022  9:00 AM Gray Chambers, [facility]

## 2022-03-10 ENCOUNTER — HOSPITAL ENCOUNTER (OUTPATIENT)
Dept: PHYSICAL THERAPY | Age: 42
Discharge: HOME | End: 2022-03-10
Payer: COMMERCIAL

## 2022-03-10 PROCEDURE — 97112 NEUROMUSCULAR REEDUCATION: CPT

## 2022-03-10 PROCEDURE — 97110 THERAPEUTIC EXERCISES: CPT

## 2022-03-10 PROCEDURE — 97535 SELF CARE MNGMENT TRAINING: CPT

## 2022-03-10 PROCEDURE — 97530 THERAPEUTIC ACTIVITIES: CPT

## 2022-03-10 NOTE — PROGRESS NOTES
PHYSICAL THERAPY - DAILY TREATMENT NOTE    Patient Name: Willow Whitt        Date: 3/10/2022  : 1980    Patient  Verified: YES  Visit #:   5   of   18  Insurance: Payor: Dc  / Plan: [address] / Product Type: Managed Care Medicaid /      In time: 9:15 Out time: 10:10   Total Treatment Time: 55     Medicare Time Tracking (below)   Total Timed Codes (min):  - 1:1 Treatment Time:  -     TREATMENT AREA/ DIAGNOSIS = Other abnormalities of gait and mobility [R26.89]  Other symptoms and signs involving the musculoskeletal system [R29.898]    SUBJECTIVE   Pain Level (on 0 to 10 scale):  0  / 10   Medication Changes/New allergies or changes in medical history, any new surgeries or procedures? NO    If yes, update Summary List   Subjective Functional Status/Changes:  []  No changes reported     Pt reports difficulty with walking still.  Pt reports no wearing AFOs around the house      OBJECTIVE      15 min Therapeutic Exercise:  [x]  See flow sheet   Rationale:      increase ROM and increase strength to improve the patients ability to perform ADLs without pain       15 min Therapeutic Activity: [x]  See flow sheet   Rationale:    functional activities to improve the patients ability to perform ADLs without pain    15 min Neuromuscular Re-ed: [x]  See flow sheet   Rationale:    improve coordination, improve balance and increase proprioception to improve the patients ability to perform ADLs without pain    10 min Self Care: Education on Raising his leg and doing ankle pumps for swelling, education on    Rationale:    education  to improve the patients ability to self     Billed With/As:   [x] TE   [] TA   [] Neuro   [] Self Care Patient Education: [x] Review HEP    [] Progressed/Changed HEP based on:   [] positioning   [] body mechanics   [] transfers   [] heat/ice application    [] other:        Other Objective/Functional Measures:    Steppage gait due to foot drop bilaterally. Post Treatment Pain Level (on 0 to 10) scale:   0  / 10     ASSESSMENT  Assessment/Changes in Function:     Pt shwoing overall decreased strength, balance, and proprioception,      []  See Progress Note/Recertification   Patient will continue to benefit from skilled PT services to modify and progress therapeutic interventions, address functional mobility deficits, address ROM deficits, address strength deficits, analyze and address soft tissue restrictions, analyze and cue movement patterns and analyze and modify body mechanics/ergonomics to attain remaining goals.    Progress toward goals / Updated goals:    Fair Progress to    [] STG    [x] LTG  1 as shown by improved overall ambulation since onset of care     PLAN  []  Upgrade activities as tolerated YES Continue plan of care   []  Discharge due to :    []  Other:      Therapist: Lissette Uriostegui DPT     Date: 3/10/2022 Time: 12:41 PM        Future Appointments   Date Time Provider Myrna Taveras   3/11/2022  9:00 AM Nancie Farm, PTA [facility]   3/14/2022 11:30 AM HBV MRI RM 1 [facility]   3/17/2022  9:15 AM Luke Durand [facility]   3/18/2022  9:00 AM Nancie Farm, PTA [facility]   3/21/2022  9:00 AM Nancie Farm, PTA [facility]   3/24/2022  9:15 AM Nancie Farm, PTA [facility]   3/25/2022  9:00 AM Siddhartha Yepez PT [facility]   3/28/2022  9:00 AM Nancie Farm, PTA [facility]   3/31/2022  9:15 AM Luke Durand [facility]   4/1/2022  9:00 AM Nancie Farm, PTA [facility]

## 2022-03-11 ENCOUNTER — HOSPITAL ENCOUNTER (OUTPATIENT)
Dept: PHYSICAL THERAPY | Age: 42
Discharge: HOME | End: 2022-03-11
Payer: COMMERCIAL

## 2022-03-11 PROCEDURE — 97110 THERAPEUTIC EXERCISES: CPT

## 2022-03-11 PROCEDURE — 97112 NEUROMUSCULAR REEDUCATION: CPT

## 2022-03-11 PROCEDURE — 97530 THERAPEUTIC ACTIVITIES: CPT

## 2022-03-11 NOTE — PROGRESS NOTES
PHYSICAL THERAPY - DAILY TREATMENT NOTE     Patient Name: Eloy Lopez        Date: 3/11/2022  : 1980   YES Patient  Verified  Visit #:     Insurance: Payor: Dc 22 / Plan: [address] / Product Type: Managed Care Medicaid /      In time: 9:01 am Out time: 9:47   Total Treatment Time: 46     Medicare/BCBS Time Tracking (below)   Total Timed Codes (min):  - 1:1 Treatment Time: -     TREATMENT AREA =  Other abnormalities of gait and mobility [R26.89]  Other symptoms and signs involving the musculoskeletal system [R29.898]    SUBJECTIVE    Pain Level (on 0 to 10 scale): 0  / 10   Medication Changes/New allergies or changes in medical history, any new surgeries or procedures?     NO    If yes, update Summary List   Subjective Functional Status/Changes:  []  No changes reported       Pt reports he is not wearing his braces in the house he just has to look @ his feet sometimes to be care of his toe drop on the left         OBJECTIVE  Modalities Rationale:   N/a    16 min Therapeutic Exercise:  [x]  See flow sheet   Rationale:      increase ROM, increase strength, improve coordination, improve balance and increase proprioception to improve the patients ability to perform ADLs without LOB     12 min Therapeutic Activity: [x]  See flow sheet   Rationale:      increase ROM, increase strength, improve coordination, improve balance and increase proprioception to improve the patients ability to perform ADLs without LOB      18 min Neuromuscular Re-ed: [x]  See flow sheet   Rationale:      increase ROM, increase strength, improve coordination, improve balance and increase proprioception to improve the patients ability to perform ADLs without LOB         Billed With/As:   [] TE   [] TA   [] Neuro   [] Self Care Patient Education: [x] Review HEP    [] Progressed/Changed HEP based on:   [] positioning   [] body mechanics   [] transfers   [] heat/ice application    [] other: Other Objective/Functional Measures: All ex completed without AFO  Increased reps supine bridge     Post Treatment Pain Level (on 0 to 10) scale:   0  / 10     ASSESSMENT    Assessment/Changes in Function:     Pt requires close S in all standing exercise. VCs throughout exercise for proper form. Mild fatigue after static standing ex and rec bike. []  See Progress Note/Recertification   Patient will continue to benefit from skilled PT services to modify and progress therapeutic interventions, address functional mobility deficits, address ROM deficits, address strength deficits, analyze and address soft tissue restrictions, analyze and cue movement patterns and instruct in home and community integration to attain remaining goals. Progress toward goals / Updated goals:    Good Progress to    [] STG    [] LTG  2 as shown by pt demonstrating improving safety in sit to stand.         PLAN    [x]  Upgrade activities as tolerated YES Continue plan of care   []  Discharge due to :    []  Other:      Therapist: Radha Larson PTA    Date: 3/11/2022 Time: 9: 52  AM     Future Appointments   Date Time Provider Myrna Taveras   3/14/2022 11:30 AM [facility] MRI RM 1 [facility]   3/17/2022  9:15 AM Jeananne Salon [facility]   3/18/2022  9:00 AM Shell Moat, PTA [facility]   3/21/2022  9:00 AM Shell Moat, PTA [facility]   3/24/2022  9:15 AM Shell Moat, PTA [facility]   3/25/2022  9:00 AM Ramón Wu PT [facility]   3/28/2022  9:00 AM Shell Moat, PTA [facility]   3/31/2022  9:15 AM Jeananne Salon [facility]   4/1/2022  9:00 AM Shell Moat, PTA [facility]

## 2022-03-17 ENCOUNTER — HOSPITAL ENCOUNTER (OUTPATIENT)
Dept: PHYSICAL THERAPY | Age: 42
Discharge: HOME | End: 2022-03-17
Payer: COMMERCIAL

## 2022-03-17 PROCEDURE — 97530 THERAPEUTIC ACTIVITIES: CPT

## 2022-03-17 PROCEDURE — 97110 THERAPEUTIC EXERCISES: CPT

## 2022-03-17 PROCEDURE — 97112 NEUROMUSCULAR REEDUCATION: CPT

## 2022-03-17 NOTE — PROGRESS NOTES
PHYSICAL THERAPY - DAILY TREATMENT NOTE    Patient Name: Betsey Zuñiga        Date: 3/17/2022  : 1980    Patient  Verified: YES  Visit #:     Insurance: Payor: Dc  / Plan: [address] / Product Type: Managed Care Medicaid /      In time: 9:15 Out time: 10:00   Total Treatment Time: 45     Medicare Time Tracking (below)   Total Timed Codes (min):  - 1:1 Treatment Time:  -     TREATMENT AREA/ DIAGNOSIS = Other abnormalities of gait and mobility [R26.89]  Other symptoms and signs involving the musculoskeletal system [R29.898]    SUBJECTIVE   Pain Level (on 0 to 10 scale):  0  / 10   Medication Changes/New allergies or changes in medical history, any new surgeries or procedures?     NO    If yes, update Summary List   Subjective Functional Status/Changes:  []  No changes reported     See Pn      OBJECTIVE      15 min Therapeutic Exercise:  [x]  See flow sheet   Rationale:      increase ROM and increase strength to improve the patients ability to perform ADLs without pain       15 min Therapeutic Activity: [x]  See flow sheet   Rationale:    functional activities to improve the patients ability to perform ADLs without pain      15 min Neuromuscular Re-ed: [x]  See flow sheet   Rationale:    improve coordination, improve balance and increase proprioception to improve the patients ability to perform ADLs without pain    Billed With/As:   [x] TE   [] TA   [] Neuro   [] Self Care Patient Education: [x] Review HEP    [] Progressed/Changed HEP based on:   [] positioning   [] body mechanics   [] transfers   [] heat/ice application    [] other:         Other Objective/Functional Measures:    See PN   Post Treatment Pain Level (on 0 to 10) scale:   0  / 10     ASSESSMENT  Assessment/Changes in Function:     See PN     []  See Progress Note/Recertification   Patient will continue to benefit from skilled PT services to modify and progress therapeutic interventions, address functional mobility deficits, address ROM deficits, address strength deficits, analyze and address soft tissue restrictions and analyze and cue movement patterns to attain remaining goals.    Progress toward goals / Updated goals:    See PN      PLAN  [x]  Upgrade activities as tolerated YES Continue plan of care   []  Discharge due to :    []  Other:      Therapist: Kathy Hylton DPT     Date: 3/17/2022 Time: 2:49 PM        Future Appointments   Date Time Provider Myrna Taveras   3/18/2022  9:00 AM [facility], PTA [facility]   3/21/2022  9:00 AM Georgianne Calandra, PTA [facility]   3/23/2022 11:00 AM Ramonita Copeland [facility]   3/24/2022  9:15 [facility]   3/25/2022  9:00 AM Khang Fowler PT [facility]   3/28/2022  9:00 [facility]   3/31/2022  9:15 AM Navya Charity [facility]   4/1/2022  9:00 AM Georgianne Calandra, PTA [facility]

## 2022-03-17 NOTE — PROGRESS NOTES
[facility] PHYSICAL THERAPY AT [facility] [address] [place], [address]  Phone: [phone]  Fax: [phone]  PROGRESS NOTE  Patient Name: Alfredo Rolon : 1980   Treatment/Medical Diagnosis: Other abnormalities of gait and mobility [R26.89]  Other symptoms and signs involving the musculoskeletal system [R29.898]   Referral Source: Josesito Maurice MD     Date of Initial Visit: 2022 Attended Visits: 7 Missed Visits: -     SUMMARY OF TREATMENT  PT consisted of manual therapy techniques, therapeutic exercises, and modalities to improve strength, improve mobility, decrease pain, and improve overall function. CURRENT STATUS  Pt showing good overall improvement. Pt still showing decreased overall strength in LE. Pt is still ambulating long distances with Bilateral AFOs and SPC. Pt able to ambulate short distances without AD or AFOs. Pt showing increased muscle activation of L DFs. Pt still has no activation of R DFs. Goal/Measure of Progress Goal Met? 1. Increase score on FOTO to > or = to 60 points to demo an increase in functional activity tolerance with the LE. Status at last Eval: 48 Current Status: 45 no   2. Pt will note < or = 2/10 pain with all mobility to improve comfort with ADLs   Status at last Eval: 3 Current Status: 0 yes   3. Pt will demonstrate a GROC score of >/= +5 to show overall improvement in function   Status at last Eval: NA Current Status:   +4 no     New Goals to be achieved in __4__  weeks:  1. Continue goals above   2.  -   3.  -   4.  -       RECOMMENDATIONS  Pt to continue PT 2x a week for 4-6 weeks to continue to improve overall strength needed for ADLs  If you have any questions/comments please contact us directly at [phone]. Thank you for allowing us to assist in the care of your patient.     Therapist Signature: Sanjana Barr Date: 3/17/2022     Time: 2:49 PM   NOTE TO PHYSICIAN:  PLEASE COMPLETE THE ORDERS BELOW AND FAX TO   [facility] at [address]: [phone]. If you are unable to process this request in 24 hours please contact our office: [phone].    ___ I have read the above report and request that my patient continue as recommended.   ___ I have read the above report and request that my patient continue therapy with the following changes/special instructions:_________________________________________________________   ___ I have read the above report and request that my patient be discharged from therapy.      Physician Signature:        Date:       Time:        Adrianna Arnold MD

## 2022-03-18 ENCOUNTER — HOSPITAL ENCOUNTER (OUTPATIENT)
Dept: PHYSICAL THERAPY | Age: 42
Discharge: HOME | End: 2022-03-18
Payer: COMMERCIAL

## 2022-03-18 PROCEDURE — 97112 NEUROMUSCULAR REEDUCATION: CPT

## 2022-03-18 PROCEDURE — 97110 THERAPEUTIC EXERCISES: CPT

## 2022-03-18 PROCEDURE — 97530 THERAPEUTIC ACTIVITIES: CPT

## 2022-03-18 NOTE — PROGRESS NOTES
PHYSICAL THERAPY - DAILY TREATMENT NOTE     Patient Name: Patricia Varela        Date: 3/18/2022  : 1980   YES Patient  Verified  Visit #:     Insurance: Payor: Dc 22 / Plan: [address] / Product Type: Managed Care Medicaid /      In time: 8:58 Out time: 9:49   Total Treatment Time: 51     Medicare/BCBS Time Tracking (below)   Total Timed Codes (min):  - 1:1 Treatment Time: -     TREATMENT AREA =  Other abnormalities of gait and mobility [R26.89]  Other symptoms and signs involving the musculoskeletal system [R29.898]    SUBJECTIVE    Pain Level (on 0 to 10 scale):  0 / 10   Medication Changes/New allergies or changes in medical history, any new surgeries or procedures?     NO    If yes, update Summary List   Subjective Functional Status/Changes:  []  No changes reported       Soreness in bilateral quads but OK         OBJECTIVE  Modalities Rationale:   N/a    20 min Therapeutic Exercise:  [x]  See flow sheet   Rationale:      increase ROM and increase strength to improve the patients ability to perform ADLs Independently     12 min Therapeutic Activity: [x]  See flow sheet   Rationale:      increase ROM, increase strength, improve coordination, improve balance and increase proprioception to improve the patients ability to perform functional ADLs    19 min Neuromuscular Re-ed: [x]  See flow sheet   Rationale:      improve coordination, improve balance and increase proprioception to improve the patients ability to perform functional ADLs        Billed With/As:   [] TE   [] TA   [] Neuro   [] Self Care Patient Education: [x] Review HEP    [] Progressed/Changed HEP based on:   [] positioning   [] body mechanics   [] transfers   [] heat/ice application    [] other:        Other Objective/Functional Measures:    Trial gt with left brace only and AD  Add 4 pt crawling forward/back, 4 pt hip ex     Post Treatment Pain Level (on 0 to 10) scale:   0 / 10 ASSESSMENT    Assessment/Changes in Function:     Challenge in eccentric quad control in sit to stand     []  See Progress Note/Recertification   Patient will continue to benefit from skilled PT services to modify and progress therapeutic interventions, address functional mobility deficits, address ROM deficits, address strength deficits, analyze and address soft tissue restrictions, analyze and cue movement patterns, assess and modify postural abnormalities, address imbalance/dizziness and instruct in home and community integration to attain remaining goals.       Progress toward goals / Updated goals:    Good Progress to    [] STG    [x] LTG  2 as shown by improving stability in gait & balance       PLAN    [x]  Upgrade activities as tolerated YES Continue plan of care   []  Discharge due to :    []  Other:      Therapist: Sophie Velazquez PTA    Date: 3/18/2022 Time: 9:49  AM     Future Appointments   Date Time Provider Myrna Taveras   3/18/2022  9:00 AM Lewanda Care, PTA [facility]   3/21/2022  9:00 AM Lewanda Care, PTA [facility]   3/23/2022 11:00 AM Bernadinesgeliane 58 [facility]   3/24/2022  9:15 AM Lewanda Care, PTA [facility]   3/25/2022  9:00 AM Lois Adams PT [facility]   3/28/2022  9:00 AM Lewanda Care, PTA [facility]   3/31/2022  9:15 AM J Luis Kang [facility]   4/1/2022  9:00 AM Lewanda Care, PTA [facility]

## 2022-03-19 PROBLEM — F19.91 H/O INTRAVENOUS DRUG USE IN REMISSION: Status: ACTIVE | Noted: 2021-03-23

## 2022-03-19 PROBLEM — B18.2 CHRONIC HEPATITIS C (HCC): Status: ACTIVE | Noted: 2021-03-23

## 2022-03-19 PROBLEM — Z87.898 H/O INTRAVENOUS DRUG USE IN REMISSION: Status: ACTIVE | Noted: 2021-03-23

## 2022-03-21 ENCOUNTER — HOSPITAL ENCOUNTER (OUTPATIENT)
Dept: PHYSICAL THERAPY | Age: 42
Discharge: HOME | End: 2022-03-21
Payer: COMMERCIAL

## 2022-03-21 PROCEDURE — 97530 THERAPEUTIC ACTIVITIES: CPT

## 2022-03-21 PROCEDURE — 97110 THERAPEUTIC EXERCISES: CPT

## 2022-03-21 PROCEDURE — 97112 NEUROMUSCULAR REEDUCATION: CPT

## 2022-03-21 NOTE — PROGRESS NOTES
PHYSICAL THERAPY - DAILY TREATMENT NOTE     Patient Name: Yen Renteria        Date: 3/21/2022  : 1980   YES Patient  Verified  Visit #:     Insurance: Payor: Dc Sanders / Plan: [address] / Product Type: Managed Care Medicaid /      In time: 9:15 am Out time: 10:07   Total Treatment Time: 53     Medicare/BCBS Time Tracking (below)   Total Timed Codes (min):  - 1:1 Treatment Time:  -     TREATMENT AREA =  Other abnormalities of gait and mobility [R26.89]  Other symptoms and signs involving the musculoskeletal system [R29.898]    SUBJECTIVE    Pain Level (on 0 to 10 scale):  0  / 10   Medication Changes/New allergies or changes in medical history, any new surgeries or procedures?     NO    If yes, update Summary List   Subjective Functional Status/Changes:  []  No changes reported       Functional improvements:able to squat better without tilting, amb in house with/without braces and no AD  Functional impairments: fall risk         OBJECTIVE  Modalities Rationale:    N/a  25 min Therapeutic Exercise:  [x]  See flow sheet   Rationale:      increase ROM and increase strength to improve the patients ability tosafely perform functional ADLs     12 min Therapeutic Activity: [x]  See flow sheet   Rationale:      increase ROM, increase strength, improve coordination, improve balance and increase proprioception to improve the patients ability to safely perform functional ADLs     16 min Neuromuscular Re-ed: [x]  See flow sheet   Rationale:      increase ROM, increase strength, improve coordination, improve balance and increase proprioception to improve the patients ability to decrease fall risk       Billed With/As:   [] TE   [] TA   [] Neuro   [] Self Care Patient Education: [x] Review HEP    [] Progressed/Changed HEP based on:   [] positioning   [] body mechanics   [] transfers   [] heat/ice application    [] other:        Other Objective/Functional Measures:  Increased bike x 7 min     Post Treatment Pain Level (on 0 to 10) scale:   0  / 10     ASSESSMENT    Assessment/Changes in Function:   Assist with counting reps during exercise  Pt demonstrating improving balance and quad control in standing squats and squat to mat without braces  Close S in dynamic gait without brace and cane  Advanced core stabilization and LE strengthening   []  See Progress Note/Recertification   Patient will continue to benefit from skilled PT services to modify and progress therapeutic interventions, address functional mobility deficits, address ROM deficits, address strength deficits, analyze and cue movement patterns, address imbalance/dizziness and instruct in home and community integration to attain remaining goals.       Progress toward goals / Updated goals:    Good Progress to    [] STG    [x] LTG  2 as shown by improving use of hip and ankle balance strategy, LE strenght       PLAN    [x]  Upgrade activities as tolerated YES Continue plan of care   []  Discharge due to :    []  Other:      Therapist: Juanito Barron PTA    Date: 3/21/2022 Time: 10:07 AM     Future Appointments   Date Time Provider Myrna Taveras   3/23/2022 11:00 AM Ramonita Copeland [facility]   3/24/2022  9:15 AM Thedford Kingfisher, PTA [facility]   3/25/2022  9:00 AM Jamir Dickerson PT [facility]   3/28/2022  9:00 AM Thedford Kingfisher, PTA [facility]   3/31/2022  9:15 AM Ines Foreman [facility]   4/1/2022  9:00 AM Thedford Kingfisher, PTA [facility]

## 2022-03-23 ENCOUNTER — HOSPITAL ENCOUNTER (OUTPATIENT)
Dept: PHYSICAL THERAPY | Age: 42
Discharge: HOME | End: 2022-03-23
Payer: COMMERCIAL

## 2022-03-23 PROCEDURE — 92507 TX SP LANG VOICE COMM INDIV: CPT

## 2022-03-23 NOTE — PROGRESS NOTES
DAILY TREATMENT NOTE    Patient Name: River Quick  Date:3/23/2022  : 1980  [x]  Patient  Verified  Payor: Dc  / Plan: [address] / Product Type: Managed Care Medicaid /   In time: 11:00  Out time: 11: 55  Total Treatment Time (min): 55 minutes  Visit #: 1 of 8   *PN due 22*   SUBJECTIVE  Pain Level (0-10 scale): 0  Any medication changes, allergies to medications, adverse drug reactions, diagnosis change, or new procedure performed?: [x] No    [] Yes (see summary sheet for update)  Subjective functional status/changes:   [] No changes reported  This 37 y/o male was referred to  this date for an evaluation of cognitive functioning. Per pt report, he is experiencing difficulty with memory, planning, and  frustration/confusion s/p drug overdose and suicide attempt with complications of acute hypoxic respiratory failure. He currently lives with his mother and relies on others for transportation to and from appointments. Pt reports he would prefer his mother to actively involved in his POC. Date of Onset: ~2021  Social History: Pt is single, resides with mother, previous /mariana, likes to exercise, likes to learn and read, videogames  Prior Functional level: Pt reports all areas of speech/language, hearing, voice, swallowing, were within normal limits. Radiology: N/A  OBJECTIVE    OUTPATIENT SPEECH-LANGUAGE EVALUATION  The Brief Cognitive Assessment Tool (BCAT) was administered. The instrument assesses orientation, verbal recall, visual recognition, visual recall, attention, abstraction, language, executive functions, visuospacial processing in adults and older adult population. Results of the sub-tests: scored as correct/total possible.        Orientation: Patient was oriented to person, month, day of week, city, state, and situation:   Immediate verbal memory: 4/4 words; delayed verbal memory: 0/4 words  (4/4 with cues)   Visual recognition/naming 3 common objects: 3/3; delayed visual recall 0/3 (3/3 with cues). Attention: tap when he heard a certain letter called out:   Mental control: count backwards 20-1:  Days of week backwards:   digits forward 2; digits backwards:   Abstraction: tell the similarities: 3/3   Language: repeat a sentence 0  Fluency: list girls names:   Executive: cognitive shiftin/2  Arithmetic reasonin (pt requesting multiple repetitions of instructions)  Judgement: (how much time to a lot to get to an appointment):  (pt requesting multiple repetitions of instructions)  Visuospatial; design:  Clock   Immediate story recall:  facts recalled:  Delayed story recall:   Story recognition:     Patient attained a score of 33 points out of a total of 50, indicating moderate cognitive deficits; specifically in the areas of immediate memory, delayed memory, attention, executive functioning, and arithmetic reasoning.          Receptive Language:  Receptive vocabulary    [x] WNL    [] Impaired [] Mild [] Mod [] Severe  Reliability of yes/no responses to questions [x] WNL    [] Impaired [] Mild [] Mod [] Severe   Reliability of responses to complex ideation [x] WNL    [] Impaired [] Mild [] Mod [] Severe  Auditory retention/processing   [] WNL    [x] Impaired [] Mild [] Mod [] Severe  Follow commands [] 1 Step [] 2 Step [] 3 Step [x] complex  (impaired d/t memory)   Objective Information:    Expressive Language  Automatic speech   [x] WNL    [] Impaired [] Mild [] Mod [] Severe  Able to identify objects/pictures  [x] WNL    [] Impaired [] Mild [] Mod [] Severe  Preservations    [x] WNL    [] Impaired [] Mild [] Mod [] Severe  Word retrieval    [x] WNL    [] Impaired [] Mild [] Mod [] Severe  Paraphasias   [x]None[] Literal    [] Phenomic   [] Jargon   [] Semantic  Able to make needs known at level [] Word   [] Phrase   [x] Sentence   [x] Gesture        [] Unable   Objective Information:    Writing: [] L or [x] R [x] WNL    [] Impaired @ [] Word [] Sentence [] Paragraph    Reading:  [x] WNL    [] Impaired @ [] Word [] Sentence [] Paragraph    Cognition:  Attention: [x] Alert    [] Drowsy  Orientation: [x] Person   [x] Place    [x] Time  Memory: [] WNL    [x] Impaired ST   [] Impaired LT   Comments:    Executive Functions:  Problem Solving: [x] WNL     [] Impaired [] Mild [] Mod [] Severe  Neglect:  [x] None    [] Left   [] Right  Self-regulation  [x] Appropriate   [] Impulsive   [] Requires verbal cues  Awareness:  [] Poor initiation   [] Disinhibition   [] Constant supervision        Speech:  Oral Verbal Apraxia: [x] None    [] Mild    [] Moderate    [] Severe   Dysarthria:  [x] None    [] Mild    [] Moderate    [] Severe   Intelligibility:  [] WNL    [] Words   [] Phrases   [] Sentences   [x] Conversation      100% Intelligible  Voice:   [x] WNL    [] Hoarse   [] Breathy   Comments:  Fluency:  [x] WNL    [] Dysfluent:        Patient/Caregiver instruction/education: [x] Review HEP    [] Progressed/Changed    HEP/Handouts given: Memory Strategies     Pain Level (0-10 scale) post treatment: 0    ASSESSMENT  [x]  See Plan of Care    Short Term Goals: To be accomplished in 4 weeks    1. Pt will recall x3/4 memory strategies and apply them to delayed recall tasks (3 words, 3 digits, picture retention) presented visually or verbally given a 3-5 minute delay with 80% acc given Felipe to improve short term memory skills for safety, QOL, ADLs, and dignity. 2. Pt will immediately recall various functional sentences, voicemails, and therapeutic activities using compensatory strategies with 80% acc given modA to improve immediate memory skills for safety, QOL, ADLs and dignity. 3. Pt will complete mental flexibility/alternating attention activities within 3 minutes with 90% acc given Felipe to improve attention for activites of choice, ADLs, and safety.   4. Pt will utilize a preferred type of planner (physical or digital) to aid in development of a routine and schedule to reduce frustration and to increase predictability and structure throughout his days with magaly. 5. Pt will complete various arthmitic/logical problem solving tasks with 85% acc given Felipe. Long Term Goals: To be accomplished in 4 weeks   1. Pt will complete various therapeutic activities to improve immediate and short term memory, mental flexibility/alternating attention skills, planning, and problem solving with 90% acc given Felipe to improve pt's QOL, ADLs, dignity, and safety.        PLAN  [x]  Upgrade activities as tolerated     []  Continue plan of care  []  Discharge due to:__  [x] Other: Tx initiated with memory compensatory strategies     Kathleen Mills 3/23/2022  10:58 AM

## 2022-03-23 NOTE — PROGRESS NOTES
[facility]   [facility], [address] [address],[address]   [address]  [phone] phone  [phone] fax    Plan of Care/ Statement of Necessity for Speech Therapy Services    Patient name: Garrick Canada Start of Care: 3/23/2022   Referral source: Carmen Finch NP : 1980    Medical Diagnosis: Unspecified symptoms and signs involving cognitive functions and awareness [R41.9]   Onset Date:~2021    Treatment Diagnosis: R41.841   Prior Hospitalization: see medical history Provider#: [id number]   Medications: Verified on Patient summary List    Comorbidities: depression, alcohol use, intravenous drug use in remission, tobacco use, chronic hepatitis C, visually impaired, scoliosis     Prior Level of Function: Pt reports all areas of speech/language, hearing, voice, swallowing, were within normal limits  The Plan of Care and following information is based on the information from the initial evaluation. Assessment/ key information: This 39 y/o male was referred to [address] this date for an evaluation of cognitive functioning. Per pt report, he is experiencing difficulty with memory, planning, and  frustration/confusion s/p drug overdose and suicide attempt with complications of acute hypoxic respiratory failure. He currently lives with his mother and relies on others for transportation to and from appointments. Pt reports he would prefer his mother to actively involved in his POC.       The Brief Cognitive Assessment Tool (BCAT) was administered. The instrument assesses orientation, verbal recall, visual recognition, visual recall, attention, abstraction, language, executive functions, visuospacial processing in adults and older adult population.  Results of the sub-tests: scored as correct/total possible.      Orientation: Patient was oriented to person, month, day of week, city, state, and situation:   Immediate verbal memory: 4/4 words; delayed verbal memory: 0/4 words  (/ with cues)   Visual recognition/naming 3 common objects: 3/3; delayed visual recall 0/3 (3/3 with cues). Attention: tap when he heard a certain letter called out:   Mental control: count backwards 20-1:  Days of week backwards:   digits forward 22; digits backwards:   Abstraction: tell the similarities: 3/3   Language: repeat a sentence 0  Fluency: list girls names:   Executive: cognitive shiftin/2  Arithmetic reasonin (pt requesting multiple repetitions of instructions)  Judgement: (how much time to a lot to get to an appointment):  (pt requesting multiple repetitions of instructions)  Visuospatial; design:  Clock   Immediate story recall:  facts recalled:  Delayed story recall:   Story recognition:      Patient attained a score of 33 points out of a total of 50, indicating moderate cognitive deficits; specifically in the areas of immediate memory, delayed memory, attention, executive functioning, and arithmetic reasoning. Problem List:    []aphasic  []dysarthric  []dysphagic       []alexic  []agraphic  []dysphonia       []dysfluency   [x]Cognitive-Linguistic Disorder       []other   Treatment Plan may include any combination of the following: Cognitive/Language Treatment and Patient Education          Patient / Family readiness to learn indicated by: asking questions, trying to perform skills and interest    Persons(s) to be included in education:   patient (P); family support person (FSP: Shauna Lam (mother)    Barriers to Learning/Limitations: None    Patient Goal (s): become self reliant, find a schedule and routine I can do forever    Patient Self Reported Health Status: good    Rehabilitation Potential: good    Short Term Goals: To be accomplished in 4 weeks   1.  Pt will recall x3/4 memory strategies and apply them to delayed recall tasks (3 words, 3 digits, picture retention) presented visually or verbally given a 3-5 minute delay with 80% acc given Felipe to improve short term memory skills for safety, QOL, ADLs, and dignity. 2. Pt will immediately recall various functional sentences, voicemails, and therapeutic activities using compensatory strategies with 80% acc given modA to improve immediate memory skills for safety, QOL, ADLs and dignity. 3. Pt will complete mental flexibility/alternating attention activities within 3 minutes with 90% acc given Felipe to improve attention for activites of choice, ADLs, and safety. 4. Pt will utilize a preferred type of planner (physical or digital) to aid in development of a routine and schedule to reduce frustration and to increase predictability and structure throughout his days with magaly. 5. Pt will complete various arthmitic/logical problem solving tasks with 85% acc given Felipe. Long Term Goals: To be accomplished in 4 weeks   1. Pt will complete various therapeutic activities to improve immediate and short term memory, mental flexibility/alternating attention skills, planning, and problem solving with 90% acc given Felipe to improve pt's QOL, ADLs, dignity, and safety. Frequency / Duration: Patient to be seen 2 times per week for 4 weeks. Patient/ Caregiver education and instruction: Diagnosis, prognosis, Compensatory Techniques    Certification Period: 3/23/22-4/20/22    Chandan Fang 3/23/2022 12:29 PM  Student Clinician   Tiffany Rodas MA, [facility] [place]  Speech-Language Pathologist    ______________________________________________________________________    I certify that the above Physical Therapy Services are being furnished while the patient is under my care. I agree with the treatment plan and certify that this therapy is necessary.     Physician Signature:                                                             Date:                                     Time:                                                                       Humberto Lester NP        Please sign and return to [facility]  [address] [place] [address], [address] [place], [place] [address] [phone] phone  [phone] fax     Thank you

## 2022-03-24 ENCOUNTER — APPOINTMENT (OUTPATIENT)
Dept: PHYSICAL THERAPY | Age: 42
End: 2022-03-24
Payer: COMMERCIAL

## 2022-03-25 ENCOUNTER — HOSPITAL ENCOUNTER (OUTPATIENT)
Dept: PHYSICAL THERAPY | Age: 42
Discharge: HOME | End: 2022-03-25
Payer: COMMERCIAL

## 2022-03-25 PROCEDURE — 97112 NEUROMUSCULAR REEDUCATION: CPT

## 2022-03-25 PROCEDURE — 97530 THERAPEUTIC ACTIVITIES: CPT

## 2022-03-25 PROCEDURE — 97110 THERAPEUTIC EXERCISES: CPT

## 2022-03-25 NOTE — PROGRESS NOTES
PHYSICAL THERAPY - DAILY TREATMENT NOTE     Patient Name: Manuel Nieto        Date: 3/25/2022  : 1980   YES Patient  Verified  Visit #:   10   of   18  Insurance: Payor: Dc 22 / Plan: [address] / Product Type: Managed Care Medicaid /      In time: 900 Out time: 940   Total Treatment Time: 40     Medicare/BCBS Time Tracking (below)   Total Timed Codes (min):   1:1 Treatment Time:       TREATMENT AREA =  Other abnormalities of gait and mobility [R26.89]  Other symptoms and signs involving the musculoskeletal system [R29.898]    SUBJECTIVE    Pain Level (on 0 to 10 scale):  0  / 10   Medication Changes/New allergies or changes in medical history, any new surgeries or procedures? NO    If yes, update Summary List   Subjective Functional Status/Changes:  []  No changes reported     Getting stronger. Reports no recent falls         OBJECTIVE  15 min Therapeutic Exercise:  [x]? See flow sheet   Rationale:      increase ROM and increase strength to improve the patients ability tosafely perform functional ADLs      10 min Therapeutic Activity: [x]? See flow sheet   Rationale:      increase ROM, increase strength, improve coordination, improve balance and increase proprioception to improve the patients ability to safely perform functional ADLs      15 min Neuromuscular Re-ed: [x]?   See flow sheet   Rationale:      increase ROM, increase strength, improve coordination, improve balance and increase       Billed With/As:   [] TE   [] TA   [] Neuro   [] Self Care Patient Education: [x] Review HEP    [] Progressed/Changed HEP based on:   [] positioning   [] body mechanics   [] transfers   [] heat/ice application    [] other:        Other Objective/Functional Measures:    Requires verbal cues for ex technique    Added stance on foam--moderately challenged   Post Treatment Pain Level (on 0 to 10) scale:   0  / 10     ASSESSMENT    Assessment/Changes in Function:   Challenged with sit to stand (without UE assist); requiring close supervision. Knee buckling noted with stance on foam- requiring close supervision     []  See Progress Note/Recertification   Patient will continue to benefit from skilled PT services to modify and progress therapeutic interventions, address functional mobility deficits, address ROM deficits, address strength deficits, analyze and address soft tissue restrictions, analyze and cue movement patterns, address imbalance/dizziness and instruct in home and community integration to attain remaining goals.       Progress toward goals / Updated goals:    Good Progress to    [] STG    [x] LTG  2 as shown by pt report       []  See Progress Note/Recertification    PLAN    [x]  Upgrade activities as tolerated {YES) Continue plan of care   []  Discharge due to :    []  Other:      Therapist: Bereket Dickson PT    Date: 3/25/2022 Time: 8:58 AM     Future Appointments   Date Time Provider Myrna Taveras   3/25/2022  9:00 AM Vy Ochoa, PT [facility]   3/28/2022  9:00 AM Yaneli Dallas PTA [facility]   3/30/2022 10:15 AM [address] [facility]   3/31/2022  9:15 AM Cindi Peraza [facility]   3/31/2022  1:15 PM [facility] MRI RM 1 [facility]   4/1/2022  9:00 AM Yaneli Dallas PTA [facility]   4/6/2022 10:15 AM [address] [facility]   4/11/2022 10:15 AM [address] [facility]   4/13/2022 10:15 AM [facility] PT [facility] [address] [facility]   4/18/2022 10:15 AM [facility] PT [facility] [address] [facility]   4/20/2022 10:15 AM [facility] PT [facility]CH [facility]   4/25/2022 10:15 AM [facility] PT [facility]   4/27/2022 10:15 AM [address] [facility]

## 2022-03-28 ENCOUNTER — HOSPITAL ENCOUNTER (OUTPATIENT)
Dept: PHYSICAL THERAPY | Age: 42
Discharge: HOME | End: 2022-03-28
Payer: COMMERCIAL

## 2022-03-28 PROCEDURE — 97112 NEUROMUSCULAR REEDUCATION: CPT

## 2022-03-28 PROCEDURE — 97530 THERAPEUTIC ACTIVITIES: CPT

## 2022-03-28 PROCEDURE — 97110 THERAPEUTIC EXERCISES: CPT

## 2022-03-28 NOTE — PROGRESS NOTES
PHYSICAL THERAPY - DAILY TREATMENT NOTE     Patient Name: Juice Aguilar        Date: 3/28/2022  : 1980   YES Patient  Verified  Visit #:     Insurance: Payor: Dc 22 / Plan: [address] / Product Type: Managed Care Medicaid /      In time: 9:15 Out time: 9:55   Total Treatment Time: 40     Medicare/BCBS Time Tracking (below)   Total Timed Codes (min):  - 1:1 Treatment Time: -     TREATMENT AREA =  Other abnormalities of gait and mobility [R26.89]  Other symptoms and signs involving the musculoskeletal system [R29.898]    SUBJECTIVE    Pain Level (on 0 to 10 scale): 0  / 10   Medication Changes/New allergies or changes in medical history, any new surgeries or procedures? NO    If yes, update Summary List   Subjective Functional Status/Changes:  []  No changes reported     Walking around the house more without AD and bilateral braces. Pt reports he sleeps better the days he has therapy.  He would like to return to running and swimming         OBJECTIVE  Modalities Rationale:  N/a     15 min Therapeutic Exercise:  [x]  See flow sheet   Rationale:      increase ROM and increase strength to improve the patients ability to perform functional ADLs     11 min Therapeutic Activity: [x]  See flow sheet   Rationale:      increase ROM, increase strength, improve coordination and increase proprioception to improve the patients ability to perform functional ADLs     14 min Neuromuscular Re-ed: [x]  See flow sheet   Rationale:      improve coordination, improve balance and increase proprioception to improve the patients ability to decrease fall risk        Billed With/As:   [] TE   [] TA   [] Neuro   [] Self Care Patient Education: [x] Review HEP    [] Progressed/Changed HEP based on:   [] positioning   [] body mechanics   [] transfers   [] heat/ice application    [] other:        Other Objective/Functional Measures:    Resume 1/2 plank,sitting TR     Post Treatment Pain Level (on 0 to 10) scale:  0 / 10     ASSESSMENT    Assessment/Changes in Function:     Pt demonstrating improving eccentric quad control in sit to stand, improving use of hip and ankle balance strategy in static standing     []  See Progress Note/Recertification   Patient will continue to benefit from skilled PT services to modify and progress therapeutic interventions, address functional mobility deficits, address ROM deficits, address strength deficits, analyze and address soft tissue restrictions, analyze and cue movement patterns, address imbalance/dizziness and instruct in home and community integration to attain remaining goals. Progress toward goals / Updated goals:    Good Progress to    [] STG    [x] LTG  2 as shown by improving static balance & sit to stand body mechanics. Recommended no running or swimming @ this time.         PLAN    [x]  Upgrade activities as tolerated YES Continue plan of care   []  Discharge due to :    []  Other:      Therapist: Manjit Darling PTA    Date: 3/28/2022 Time: 9:55  AM     Future Appointments   Date Time Provider Myrna Taveras   3/30/2022 10:15 AM [address] [facility]   3/31/2022  9:15 AM [facility]   3/31/2022  1:30 PM [facility] MRI RM 1 [facility]   4/1/2022  9:00 AM Denise Olivo PTA [facility]   4/6/2022 10:15 AM [address] [facility]   4/11/2022 10:15 AM [facility] PT [facility] [address] [facility]   4/13/2022 10:15 AM [facility] PT [facility] [address] [facility] [address] [facility]   4/18/2022 10:15 AM [facility] PT [facility] [address] [facility] [address] [facility]   4/20/2022 10:15 AM [facility] PT [facility] SPE[facility] [address] [facility] [address] [facility]   4/25/2022 10:15 AM [facility] PT [facility] [address] [facility]   4/27/2022 10:15 AM Ramonita Copeland [facility]

## 2022-03-30 ENCOUNTER — TELEPHONE (OUTPATIENT)
Dept: PHYSICAL THERAPY | Age: 42
End: 2022-03-30

## 2022-03-31 ENCOUNTER — HOSPITAL ENCOUNTER (OUTPATIENT)
Age: 42
Discharge: HOME | End: 2022-03-31
Attending: NURSE PRACTITIONER

## 2022-03-31 ENCOUNTER — HOSPITAL ENCOUNTER (OUTPATIENT)
Dept: PHYSICAL THERAPY | Age: 42
Discharge: HOME | End: 2022-03-31
Payer: COMMERCIAL

## 2022-03-31 PROCEDURE — 97530 THERAPEUTIC ACTIVITIES: CPT

## 2022-03-31 PROCEDURE — 97535 SELF CARE MNGMENT TRAINING: CPT

## 2022-03-31 PROCEDURE — 97112 NEUROMUSCULAR REEDUCATION: CPT

## 2022-03-31 PROCEDURE — 97110 THERAPEUTIC EXERCISES: CPT

## 2022-03-31 NOTE — PROGRESS NOTES
[facility] PHYSICAL THERAPY AT [facility] [address] [facility] [address]  Phone: [phone]  Fax: [phone]  PROGRESS NOTE  Patient Name: Molly Perez : 1980   Treatment/Medical Diagnosis: Other abnormalities of gait and mobility [R26.89]  Other symptoms and signs involving the musculoskeletal system [R29.898]   Referral Source: Arvind Dodge MD     Date of Initial Visit: 2022 Attended Visits: 12 Missed Visits: -     SUMMARY OF TREATMENT  PT consisted of manual therapy techniques, therapeutic exercises, and modalities to improve strength, improve mobility, decrease pain, and improve overall function. CURRENT STATUS  Pt is s/p multiple fasciotomies in 2021 with generalized B lower extremity weakness resulting in difficulty walking and increased risk of falling. Pt currenlty ambulating with B AFO and quad cane. Pt demonstrating decreased overall L strength. Notable MMT R;L hip abduction: 4-/5;4-/5, hip ext: 4/5;4/5, knee flexion 5-/5;5-/5, Ankle DF: 3+/5; 1/5,  Ankle inversion: 3-/5; 3-/5, Ankle eversion 3-/5; 3-/5. SLS < 5 seconds bilaterally. ROM WFL. Pt showing good overall progress but still having difficulty with all walking and weightbearing ADLs due to strength and balance deficits shown above. Goal/Measure of Progress Goal Met? 1. Increase score on FOTO to > or = to 60 points to demo an increase in functional activity tolerance with the LE. Status at last Eval: 45 Current Status: 45 no   2. Pt will demonstrate a GROC score of >/= +5 to show overall improvement in function   Status at last Eval: +4 Current Status: +4 no     New Goals to be achieved in __4__  weeks:  1. Continue goals above   2.   Pt to ambulate with AFOs with no AD for > 250ft to show improved independence with ambulation   3.  -   4.  -       RECOMMENDATIONS  Pt to continue PT 2x a week for 6-8 weeks to improve overall function  If you have any questions/comments please contact us directly at [phone]. Thank you for allowing us to assist in the care of your patient. Therapist Signature: Rahul Calhoun Date: 3/31/2022     Time: 10:23 AM   NOTE TO PHYSICIAN:  PLEASE COMPLETE THE ORDERS BELOW AND FAX TO   [facility] [place] [facility] at [address]: [phone]. If you are unable to process this request in 24 hours please contact our office: [phone].    ___ I have read the above report and request that my patient continue as recommended.   ___ I have read the above report and request that my patient continue therapy with the following changes/special instructions:_________________________________________________________   ___ I have read the above report and request that my patient be discharged from therapy.      Physician Signature:        Date:       Time:        Adalberto Hawley MD

## 2022-03-31 NOTE — PROGRESS NOTES
PHYSICAL THERAPY - DAILY TREATMENT NOTE    Patient Name: Alexus Alcazar        Date: 3/31/2022  : 1980    Patient  Verified: YES  Visit #:     Insurance: Payor: Dc Sanders / Plan: [address] / Product Type: Managed Care Medicaid /      In time: 9:15 Out time: 10:15   Total Treatment Time: 60     Medicare Time Tracking (below)   Total Timed Codes (min):  - 1:1 Treatment Time:  -     TREATMENT AREA/ DIAGNOSIS = Other abnormalities of gait and mobility [R26.89]  Other symptoms and signs involving the musculoskeletal system [R29.898]    SUBJECTIVE   Pain Level (on 0 to 10 scale):  0  / 10   Medication Changes/New allergies or changes in medical history, any new surgeries or procedures?     NO    If yes, update Summary List   Subjective Functional Status/Changes:  []  No changes reported     See PN      OBJECTIVE      20 min Therapeutic Exercise:  [x]  See flow sheet   Rationale:      increase ROM, increase strength and improve coordination to improve the patients ability to perform ADLs without pain       15 min Therapeutic Activity: [x]  See flow sheet   Rationale:    functional activities to improve the patients ability to perform ADLs without pain    15 min Neuromuscular Re-ed: [x]  See flow sheet   Rationale:    improve coordination, improve balance and increase proprioception to improve the patients ability to perform ADLs without pain    10 min Self Care: Education on HEP   Rationale:    education to improve the patients ability to self manage symptom    Billed With/As:   [] TE   [] TA   [] Neuro   [] Self Care Patient Education: [x] Review HEP    [] Progressed/Changed HEP based on:   [] positioning   [] body mechanics   [] transfers   [] heat/ice application    [] other:        Other Objective/Functional Measures:    See PN   Post Treatment Pain Level (on 0 to 10) scale:   0  / 10     ASSESSMENT  Assessment/Changes in Function:     See PN      []  See Progress Note/Recertification   Patient will continue to benefit from skilled PT services to modify and progress therapeutic interventions, address functional mobility deficits, address ROM deficits, address strength deficits, analyze and address soft tissue restrictions and analyze and cue movement patterns to attain remaining goals.    Progress toward goals / Updated goals:    See PN     PLAN  [x]  Upgrade activities as tolerated YES Continue plan of care   []  Discharge due to :    []  Other:      Therapist: Daisy Wells DPT     Date: 3/31/2022 Time: 10:42 AM        Future Appointments   Date Time Provider Myrna Taveras   3/31/2022  1:30 PM [facility] MRI RM 1 [facility]   4/6/2022 10:15 AM [address] [facility]   4/11/2022 10:15 AM [facility] PT [facility]   4[facility]13/2022 10[facility]15 AM [facility] PT [facility]   4[facility]18/2022 10:15 AM [facility] PT [facility]   4[facility]20/2022 10:15 AM [facility] PT [facility]   4/25/2022 10:15 AM [facility] PT [facility]   4/27/2022 10:15 AM [facility]  [facility]

## 2022-04-01 ENCOUNTER — APPOINTMENT (OUTPATIENT)
Dept: PHYSICAL THERAPY | Age: 42
End: 2022-04-01
Payer: COMMERCIAL

## 2022-04-06 ENCOUNTER — HOSPITAL ENCOUNTER (OUTPATIENT)
Dept: PHYSICAL THERAPY | Age: 42
Discharge: HOME | End: 2022-04-06
Payer: COMMERCIAL

## 2022-04-06 PROCEDURE — 92507 TX SP LANG VOICE COMM INDIV: CPT

## 2022-04-06 NOTE — PROGRESS NOTES
ST DAILY TREATMENT NOTE    Patient Name: Deborah Anderson  Date:2022  : 1980  [x]  Patient  Verified  Payor: Payor: Dc 22 / Plan: [address] / Product Type: Managed Care Medicaid /   In time: 10:12  Out time: 11:01  Total Treatment Time (min): 49 minutes  Visit #: 2 of 8  *PN due 22*    Treatment Diagnosis: Unspecified symptoms and signs involving cognitive functions and awareness [R41.9]    SUBJECTIVE  Pain Level (0-10 scale): 0  Any medication changes, allergies to medications, adverse drug reactions, diagnosis change, or new procedure performed?: [x] No    [] Yes (see summary sheet for update)    Subjective functional status/changes:   [] No changes reported  Pt reports his living situation has not been \"ideal\" or what \"he expected\" in regards to his healing process. He further elaborated, stating it is difficult to organize and complete tasks at home. OBJECTIVE  Treatment provided includes:  Increase/Improve:  []  Voice Quality [x]  Cognitive Linguistic Skills []  Laryngeal/Pharyngeal Exercises   []  Vocal Loudness []  Reading Comprehension []  Swallowing Skills    []  Vocal Cord Function []  Auditory Comprehension []  Oral Motor Skills   []  Resonance []  Writing Skills [x]  Compensatory strategies    []  Speech Intelligibility []  Expressive Language []  Attention   []  Breath Support/Coord.  []  Receptive language [x]  Memory   []  Articulation []  Safety Awareness [x] Pt Education    []  Fluency []  Word Retrieval []        Treatment Provided:  -Pt Education  -Immediate Memory  -Short Term Memory  -Problem Solving       Patient/Caregiver  Education: [x] Review HEP      HEP/Handouts given: Deductive Reasoning WS    Pain Level (0-10 scale) post treatment: 0    ASSESSMENT   []   Improving appropriately and progressing toward goals  [x]   Improving slowly and progressing toward goals  []   Approximating goals/maximum potential  [x]   Continues to benefit from skilled therapy to address remaining functional deficits  []   Not progressing toward goals and plan of care will be adjusted    Patient will continue to benefit from skilled therapy to address remaining functional deficits: cognitive-linguistic     Progress towards goals / Updated goals:  1. Pt will recall x3/4 memory strategies and apply them to delayed recall tasks (3 words, 3 digits, picture retention) presented visually or verbally given a 3-5 minute delay with 80% acc given Felipe to improve short term memory skills for safety, QOL, ADLs, and dignity. Current 4/6/22: Pt recalled x2/4 memory strategies CARY and x3/4 with modA. Words (3): Given a x3 minute delay and using the strategies \"repetition\"/\"write it down\", pt recalled 0% CARY increasing to 100% given mod-maxA. After further reviewing words with pt, student SLP applied target words to a short story and tasked pt to use strategy \"picture it\" to recall the target words. Pt also chose to write down the target words for a second time. After ~15 minute delay, pt recalled the same (3) words with 33% acc CARY increasing to 67% acc given modA. 2. Pt will immediately recall various functional sentences, voicemails, and therapeutic activities using compensatory strategies with 80% acc given modA to improve immediate memory skills for safety, QOL, ADLs and dignity. Current 4/6/22:   Simulated Voicemail: 38% acc CARY increasing to 63% acc given modA/questions. 3. Pt will complete mental flexibility/alternating attention activities within 3 minutes with 90% acc given Felipe to improve attention for activites of choice, ADLs, and safety. Current 4/6/22: Not targeted this date. 4. Pt will utilize a preferred type of planner (physical or digital) to aid in development of a routine and schedule to reduce frustration and to increase predictability and structure throughout his days with magaly.    Current 4/6/22: Not targeted this date.      5. Pt will complete various arthmitic/logical problem solving tasks with 85% acc given Felipe.   Current 22:   Logical Problem Solvin% acc CARY increasing to 60% acc given mod-max teaching/skilled instruction. Pt reports difficulty sustaining attention/remembering what has already been completed in order to progress through the logical problem solving task. Pt reports frustration in regards to feeling like he could previously complete similar activities with ease and now similar  activities are more \"difficult\". Pt further reported that once he becomes frustrated, he no longer feels productive. SLP/pt dicussed self-monitoring his feelings of frustration and knowing when to take \"a step back\" from activities to reduce greater frustration from building up.     PLAN  [x]  Continue plan of care  []  Modify Goals/Treatment Plan      []  Discharge due to:  [] Other:      Rob Whitten 2022  9:22 AM    Future Appointments   Date Time Provider Myrna Taveras   2022 10:15 AM [address]   2022 10:15 AM [address]   2022 10:15 AM [address]   2022 10:15 AM [address] PT [facility] [address]   2022  1:30 PM HBV MRI RM 1 HBVRMRI HBV   2022 10:15 AM [address]   2022 10:15 AM [address] PT [facility] [address]   2022 10:15 AM [facility]

## 2022-04-11 ENCOUNTER — HOSPITAL ENCOUNTER (OUTPATIENT)
Dept: PHYSICAL THERAPY | Age: 42
Discharge: HOME | End: 2022-04-11
Payer: COMMERCIAL

## 2022-04-11 PROCEDURE — 92507 TX SP LANG VOICE COMM INDIV: CPT

## 2022-04-11 NOTE — PROGRESS NOTES
ST DAILY TREATMENT NOTE    Patient Name: Sweetie Saunders  Date:2022  : 1980  [x]  Patient  Verified  Payor: Payor: Dc 22 / Plan: [address] / Product Type: Managed Care Medicaid /   In time: 10:18 Out time: 11:00  Total Treatment Time (min): 42 minutes  Visit #: 3 of 8  *PN due 22*    Treatment Diagnosis: Unspecified symptoms and signs involving cognitive functions and awareness [R41.9]    SUBJECTIVE  Pain Level (0-10 scale): 0  Any medication changes, allergies to medications, adverse drug reactions, diagnosis change, or new procedure performed?: [x] No    [] Yes (see summary sheet for update)    Subjective functional status/changes:   [x] No changes reported      OBJECTIVE  Treatment provided includes:  Increase/Improve:  []  Voice Quality [x]  Cognitive Linguistic Skills []  Laryngeal/Pharyngeal Exercises   []  Vocal Loudness []  Reading Comprehension []  Swallowing Skills    []  Vocal Cord Function []  Auditory Comprehension []  Oral Motor Skills   []  Resonance []  Writing Skills [x]  Compensatory strategies    []  Speech Intelligibility []  Expressive Language [x]  Attention   []  Breath Support/Coord. []  Receptive language [x]  Memory   []  Articulation []  Safety Awareness [x] Pt Education   []  Fluency []  Word Retrieval []        Treatment Provided:  -Pt Education  -Immediate Recall  -Delayed Recall  -Alternating Attention  -Problem Solving   -Planning     Patient/Caregiver  Education: [x] Review HEP      HEP/Handouts given:  Alternating Attention Picture Scenes    Pain Level (0-10 scale) post treatment: 0    ASSESSMENT   []   Improving appropriately and progressing toward goals  [x]   Improving slowly and progressing toward goals  []   Approximating goals/maximum potential  [x]   Continues to benefit from skilled therapy to address remaining functional deficits  []   Not progressing toward goals and plan of care will be adjusted    Patient will continue to benefit from skilled therapy to address remaining functional deficits: cognitive-linguistic     Progress towards goals / Updated goals:  1. Pt will recall x3/4 memory strategies and apply them to delayed recall tasks (3 words, 3 digits, picture retention) presented visually or verbally given a 3-5 minute delay with 80% acc given Felipe to improve short term memory skills for safety, QOL, ADLs, and dignity. Current 4/11/22: Pt recalled x2/4 memory strategies CARY and x4/4 with modA.      Words (3): Given a x3 minute delay and using the strategies \"repetition\"/\"write it down\"/\"association\", pt recalled with 100% CARY.     2. Pt will immediately recall various functional sentences, voicemails, and therapeutic activities using compensatory strategies with 80% acc given modA to improve immediate memory skills for safety, QOL, ADLs and dignity. Current 4/11/22:   Simulated Voicemail: Given x1 rep of the voicemail: 43% acc CARY remaining at 43% acc given mod-maxA. Given x2 reps of the voicemail: 43% acc CARY increasing to 71% acc given mod-maxA. Pt reports he typically lets calls go to voicemail at home so that he is able to replay the voicemail as many times as needed to type information in his digital planner or take notes. He additionally reports he keeps a piece of paper/pen near him most of time incase important information is presented that he needs to eventually recall.      3. Pt will complete mental flexibility/alternating attention activities within 3 minutes with 90% acc given Felipe to improve attention for activites of choice, ADLs, and safety. Current 4/11/22: Within 2 minutes and 15 seconds, pt circled various differences between two different picture scenes with 80% acc CARY. Given an additional minute and mod-maxA, pt circled 100% of the items.      4.  Pt will utilize a preferred type of planner (physical or digital) to aid in development of a routine and schedule to reduce frustration and to increase predictability and structure throughout his days with magaly.    Current 4/11/22: Pt reports he is currently utilizing in a digital planner but wishes it would send an alarm to his phone rather than a notification. Student SLP/pt discussed using a pocket planner + digital planner so the pt is reviewing the information twice while adding it to the planners. Additionally, the efficiency of being able to quickly write something in the planner vs using his phone was discussed. Pt to look into buying a pocket planner.      5. Pt will complete various arthmitic/logical problem solving tasks with 85% acc given Felipe.   Current 4/11/22:   Logical Problem Solving:   HEP: Student SLP sent pt home with deduction puzzle last session. During the previous session, student SLP/pt initiated the puzzle by filling 3 spots in. Pt returned the puzzle with the additional slots filled in with 100% acc this date. Deduction Puzzle: 40% acc CARY increasing to 65% acc given mod-max teaching/skilled instruction. Pt benefits from repeating the clues provided and crossing items off lists/starring possible options. Pt reports difficulty sustaining attention/remembering what has already been completed in order to progress through the logical problem solving task.      PLAN  [x]  Continue plan of care  []  Modify Goals/Treatment Plan      []  Discharge due to:  [] Other:    Carlos Pacheco 4/11/2022  8:52 AM    Future Appointments   Date Time Provider Myrna Taveras   4/11/2022 10:15 AM [address] [facility]   4/13/2022 10:15 AM [address] [facility]   4/18/2022 10:15 AM [facility] PT [facility]   4/18/2022  1:30 PM HBV MRI RM 1 HBVRMRI HBV   4/20/2022 10:15 AM [address] [facility]   4/25/2022 10:15 AM [facility] PT [facility]   4/27/2022 10:15 AM [facility]

## 2022-04-13 ENCOUNTER — HOSPITAL ENCOUNTER (OUTPATIENT)
Dept: PHYSICAL THERAPY | Age: 42
Discharge: HOME | End: 2022-04-13
Payer: COMMERCIAL

## 2022-04-13 PROCEDURE — 92507 TX SP LANG VOICE COMM INDIV: CPT

## 2022-04-13 NOTE — PROGRESS NOTES
ST DAILY TREATMENT NOTE    Patient Name: Cathy Merritt  Date:2022  : 1980  [x]  Patient  Verified  Payor: Payor: Dc  / Plan: [address] / Product Type: Managed Care Medicaid /   In time: 10:15  Out time: 10:58  Total Treatment Time (min): 43 minutes  Visit #: 4 of 8  *PN due 22*    Treatment Diagnosis: Unspecified symptoms and signs involving cognitive functions and awareness [R41.9]    SUBJECTIVE  Pain Level (0-10 scale): 0  Any medication changes, allergies to medications, adverse drug reactions, diagnosis change, or new procedure performed?: [x] No    [] Yes (see summary sheet for update)    Subjective functional status/changes:   [] No changes reported    Pt asked if student SLP/SLP could recommend any resources such as a [employer] video to further educ re: his deficits or merlin that could help  at home. Student SLP/SLP advised to utilize credible sources, but will compile some apps to utilize at home. OBJECTIVE  Treatment provided includes:  Increase/Improve:  []  Voice Quality [x]  Cognitive Linguistic Skills []  Laryngeal/Pharyngeal Exercises   []  Vocal Loudness []  Reading Comprehension []  Swallowing Skills    []  Vocal Cord Function []  Auditory Comprehension []  Oral Motor Skills   []  Resonance []  Writing Skills [x]  Compensatory strategies    []  Speech Intelligibility []  Expressive Language [x]  Attention   []  Breath Support/Coord.  []  Receptive language [x]  Memory   []  Articulation []  Safety Awareness [x] Pt Education   []  Fluency []  Word Retrieval []        Treatment Provided:  -Pt Education  -Logical Problem Solving  -Mental Flexibility  -Alternating Attention   -Immediate Recall  -Delayed Recall    Patient/Caregiver  Education: [x] Review HEP      HEP/Handouts given: Mental Flexibility Worksheet    Pain Level (0-10 scale) post treatment: 0    ASSESSMENT   []   Improving appropriately and progressing toward goals  [x]   Improving slowly and progressing toward goals  []   Approximating goals/maximum potential  [x]   Continues to benefit from skilled therapy to address remaining functional deficits  []   Not progressing toward goals and plan of care will be adjusted    Patient will continue to benefit from skilled therapy to address remaining functional deficits: cognitive-linguistic     Progress towards goals / Updated goals:  1. Pt will recall x3/4 memory strategies and apply them to delayed recall tasks (3 words, 3 digits, picture retention) presented visually or verbally given a 3-5 minute delay with 80% acc given Felipe to improve short term memory skills for safety, QOL, ADLs, and dignity. Current 4/13/22: Pt recalled x3/4 memory strategies CARY and x4/4 with Felipe.      Digits (3): Given a x5 minute delay and using the strategies \"repetition\"/\"write it down\",  pt recalled with 100% acc magaly with prompt to strategy he utilized followed by a delay. - address using words/pictures next session      2. Pt will immediately recall various functional sentences, voicemails, and therapeutic activities using compensatory strategies with 80% acc given modA to improve immediate memory skills for safety, QOL, ADLs and dignity.   Current 4/13/22:   Simulated Voicemail: Given x1 rep of the voicemail: 0% acc CARY remaining at 0% acc given mod-maxA. Given x2 reps of the voicemail: 0% acc CARY increasing to 25% acc given mod-maxA. - downgrade to repeating sentences/ 3 elements of a sentence next session using chunking      3. Pt will complete mental flexibility/alternating attention activities within 3 minutes with 90% acc given Felipe to improve attention for activites of choice, ADLs, and safety. Current 4/13/22:  Pt returned HEP alternating attention task. Pt reported the activity took him 3-5 minutes to complete ---> 71% acc CARY; reviewed with clinician this date with increasing acc to 100% with modA.    Mental Flexibility/Ranking Words:   Presented verbally --> (3 words)-100% acc CARY; (4 words)-100% acc CARY. Pt benefited from student SLP repeating words and then pt repeating words before ranking words. - continue to address with alternating attention tasks.     4. Pt will utilize a preferred type of planner (physical or digital) to aid in development of a routine and schedule to reduce frustration and to increase predictability and structure throughout his days with magaly.    Current 4/13/12: Pt reported he forgot to talk to his mom about getting a pocket planner- will f/u about planner next session.       5.  Pt will complete various arithmetic /logical problem solving tasks with 85% acc given Felipe.   Current 4/13/22: Reductive reasoning: Calendar Dates: 70% acc CARY increasing to 90% acc given mod-maxA/reps of directions- continue addressing with functional everyday/ vs arithmetic problem solving      PLAN  [x]  Continue plan of care  []  Modify Goals/Treatment Plan      []  Discharge due to:  [] Other:    Lovely Doran 4/13/2022  10:15 AM    Future Appointments   Date Time Provider Myrna Taveras   4/18/2022 10:15 AM [address] [facility]   4/18/2022  1:30 PM HBV MRI RM 1 HBVRMRI HBV   4/19/2022 10:45 AM E[place], PTA [facility]   4/20/2022 10:15 AM [facility] PT [place] SPEECH [place] [facility]   4/21/2022  2:15 PM Rico Gomezs [facility]   4/22/2022  9:45 AM Ebb [place], PTA [facility]   4/25/2022 10:15 AM [address] [facility]   4/26/2022  9:15 AM E[place], PTA [facility]   4/27/2022 10:15 AM [address] [facility]   4/28/2022  1:30 PM [place]on [place] [facility]   4/29/2022 11:45 AM Horald Pintos [facility]   5/3/2022 10:00 AM E[place], PTA [facility]   5/5/2022  9:15 AM Horald Pintos [facility]   5/6/2022  9:00 AM E[place], PTA [facility]   5/10/2022  9:15 AM Horald Pintos [facility]   5/12/2022 10:00 AM E[place], PTA [facility]   5/13/2022  9:45 AM E[place], PTA [facility]

## 2022-04-18 ENCOUNTER — HOSPITAL ENCOUNTER (OUTPATIENT)
Dept: PHYSICAL THERAPY | Age: 42
Discharge: HOME | End: 2022-04-18
Payer: COMMERCIAL

## 2022-04-18 PROCEDURE — 92507 TX SP LANG VOICE COMM INDIV: CPT

## 2022-04-18 NOTE — PROGRESS NOTES
ST DAILY TREATMENT NOTE    Patient Name: Cathy Merritt  Date:2022  : 1980  [x]  Patient  Verified  Payor: Payor: Dc  / Plan: [address] / Product Type: Managed Care Medicaid /   In time: 10:29  Out time: 11:17  Total Treatment Time (min): 46 minutes  Visit #: 5 of 8  *PN due 22*    Treatment Diagnosis: Unspecified symptoms and signs involving cognitive functions and awareness [R41.9]    SUBJECTIVE  Pain Level (0-10 scale): 0  Any medication changes, allergies to medications, adverse drug reactions, diagnosis change, or new procedure performed?: [x] No    [] Yes (see summary sheet for update)    Subjective functional status/changes:   [] No changes reported  Pt states he has been experiencing some stress regarding scheduling appointments with doctors, handling insurance concerns, and transportation, however reports his mom has been helping him with these concerns. Pt reports things are getting \"better\" at home in regards to coordinating schedules/organization with his mother. Pt states his biggest barrier in his day-to-day life is his memory. OBJECTIVE  Treatment provided includes:  Increase/Improve:  []  Voice Quality [x]  Cognitive Linguistic Skills []  Laryngeal/Pharyngeal Exercises   []  Vocal Loudness []  Reading Comprehension []  Swallowing Skills    []  Vocal Cord Function []  Auditory Comprehension []  Oral Motor Skills   []  Resonance []  Writing Skills [x]  Compensatory strategies    []  Speech Intelligibility []  Expressive Language [x]  Attention   []  Breath Support/Coord.  []  Receptive language [x]  Memory   []  Articulation []  Safety Awareness [x] Pt Education    []  Fluency []  Word Retrieval []        Treatment Provided:  -Pt Education   -Delayed Recall  -Immediate Recall  -Problem Solving  -Alternating Attention     Patient/Caregiver  Education: [x] Review HEP      HEP/Handouts given: Memory/Word Search Apps    Pain Level (0-10 scale) post treatment: 0    ASSESSMENT   []   Improving appropriately and progressing toward goals  [x]   Improving slowly and progressing toward goals  []   Approximating goals/maximum potential  [x]   Continues to benefit from skilled therapy to address remaining functional deficits  []   Not progressing toward goals and plan of care will be adjusted    Patient will continue to benefit from skilled therapy to address remaining functional deficits: cognitive-linguistic     Progress towards goals / Updated goals:  1. Pt will recall x3/4 memory strategies and apply them to delayed recall tasks (3 words, 3 digits, picture retention) presented visually or verbally given a 3-5 minute delay with 80% acc given Felipe to improve short term memory skills for safety, QOL, ADLs, and dignity. Current 4/18/22: Pt recalled x3/4 memory strategies CARY and x4/4 with Felipe.      Words (3): Given a x5 minute delay and using the strategies \"repetition\"/\"write it down\",  pt recalled with 100%. Pt took x3 words and created a story to help recall target date.       2. Pt will immediately recall various functional sentences, voicemails, and therapeutic activities using compensatory strategies with 80% acc given modA to improve immediate memory skills for safety, QOL, ADLs and dignity.   Current 4/18/22:   Repeating Info from Sentences: 43% acc CARY increasing to 71% acc given modA.      3. Pt will complete mental flexibility/alternating attention activities within 3 minutes with 90% acc given Felipe to improve attention for activites of choice, ADLs, and safety. Current 4/18/22:  Alternating Attention Between Letters & Numbers: Within x3 mins: 40% acc CARY. Within x6 minutes: 80% with modA/clinician tracing of letters to numbers.      4. Pt will utilize a preferred type of planner (physical or digital) to aid in development of a routine and schedule to reduce frustration and to increase predictability and structure throughout his days with magaly.    Current 4/18/12: Pt reported he forgot to talk to his mom about getting a pocket planner. Student SLP wrote note to pt's mother about purchasing a pocket planner this date.      5. Pt will complete various arithmetic/logical problem solving tasks with 85% acc given Felipe.   Current 4/18/22:   Arithmetic Word Problems: 75% acc CARY increasing to 90% acc given min-modA. Pt benefited from scratch paper/pen to compute equations.      PLAN  [x]  Continue plan of care  []  Modify Goals/Treatment Plan      []  Discharge due to:  [] Other:    Renu Duck 4/18/2022  7:38 AM    Future Appointments   Date Time Provider Myrna Taveras   4/18/2022 10:15 AM [address] [facility]   4/18/2022  1:30 PM HBV MRI RM 1 HBVRMRI HBV   4/19/2022 10:45 AM Nelda [place], PTA [facility]   4/20/2022 10:15 AM [address] [facility]   4/21/2022  2:15 PM Darlen Challenger [facility]   4/22/2022  9:45 AM Nelda [place], PTA [facility]   4/25/2022 10:15 AM [facility] PT [place] SPEECH [place] [facility]   4/26/2022  9:15 AM Nelda [place], PTA [facility]   4/27/2022 10:15 AM [facility] PT [place] [facility] SPEECH [place] [facility]   4/28/2022  1:30 PM Darlen Challenger [facility]   4/29/2022 11:45 AM Darlen Challenger [facility]   5/3/2022 10:00 AM Nelda Cutler PTA [facility]   5/5/2022  9:15 AM Marcos Haywood [facility]   5/6/2022  9:00 AM Nelda Cutler PTA [facility]   5/10/2022  9:15 AM Marcos Haywood [facility]   5/12/2022 10:00 AM Nelda Cutler PTA [facility]   5/13/2022  9:45 AM Nelda Cutler PTA [facility]

## 2022-04-18 NOTE — PROGRESS NOTES
[facility]  [address] [place] [address], [address] [place] [address] , [address] [place] [address]  Phone: [phone]             Fax: [phone]     Speech Therapy Physician Update  [x] Progress Note  [] Discharge Summary  Patient name: Ellen Weathers Start of Care: 3/23/22   Referral source: Radha Poe NP : 1980   Medical/Treatment Diagnosis: Unspecified symptoms and signs involving cognitive functions and awareness [R41.9]  Payor: [employer] / Plan: [address] / Product Type: Managed Care Medicaid /  Onset Date: ~2021     Prior Hospitalization: see medical history Provider#: [id number]   Medications: Verified on Patient Summary List    Comorbidities: depression, alcohol use, intravenous drug use in remission, tobacco use, chronic hepatitis C, visually impaired, scoliosis    Prior Level of Function: Pt reports all areas of speech/language, hearing, voice, swallowing, were within normal limits  Visits from Start of Care: 5   Missed Visits: 1    Status at Evaluation/Last Progress Note:   The Brief Cognitive Assessment Tool (BCAT) was administered. The instrument assesses orientation, verbal recall, visual recognition, visual recall, attention, abstraction, language, executive functions, visuospacial processing in adults and older adult population. Results of the sub-tests: scored as correct/total possible.      Orientation: Patient was oriented to person, month, day of week, city, state, and situation:   Immediate verbal memory: 4/4 words; delayed verbal memory: 0/4 words  (4/4 with cues)   Visual recognition/naming 3 common objects: 3/3; delayed visual recall 0/3 (3/3 with cues).    Attention: tap when he heard a certain letter called out: /  Mental control: count backwards 20-1: 1/1 Days of week backwards: 1/  digits forward 2/2; digits backwards: 2/2  Abstraction: tell the similarities: 3/3   Language: repeat a sentence 0/1  Fluency: list girls names: 1  Executive: cognitive shiftin/2  Arithmetic reasonin/1 (pt requesting multiple repetitions of instructions)  Judgement: (how much time to a lot to get to an appointment):  (pt requesting multiple repetitions of instructions)  Visuospatial; design:  Clock   Immediate story recall:  facts recalled: 0 Delayed story recall:   Story recognition:      Patient attained a score of 33 points out of a total of 50, indicating moderate cognitive deficits; specifically in the areas of immediate memory, delayed memory, attention, executive functioning, and arithmetic reasoning. Progress towards Goals:   1. Pt will recall x3/4 memory strategies and apply them to delayed recall tasks (3 words, 3 digits, picture retention) presented visually or verbally given a 3-5 minute delay with 80% acc given Felipe to improve short term memory skills for safety, QOL, ADLs, and dignity. Current:   Pt recalled ~x3/4 compensatory strategies CARY and ~x4/4 with Felipe. Digits: Given a x5 minute delay, pt recalled 100% with magaly. Words: Given a x3-5 minute delay, pt recalled 100% acc CARY. -progressing/advance goal     2. Pt will immediately recall various functional sentences, voicemails, and therapeutic activities using compensatory strategies with 80% acc given modA to improve immediate memory skills for safety, QOL, ADLs and dignity. Current:  Simulated Voicemail: 43% acc CARY increasing to 57% acc given maxA/x2 reps. Recalling Info from Sentences: 43% acc CARY increasing to 71% acc given modA.   -progressing/continue addressing     3. Pt will complete mental flexibility/alternating attention activities within 3 minutes with 90% acc given Felipe to improve attention for activites of choice, ADLs, and safety. Current:   Mental Flexibility/Alternating Attention: Within 3 minutes, pt ~60% acc CARY. Given additional minutes and modA, pt ~90% acc.   -progressing/continue addressing    4.  Pt will utilize a preferred type of planner (physical or digital) to aid in development of a routine and schedule to reduce frustration and to increase predictability and structure throughout his days with magaly.    Current: Pt reports he is currently utilizing a digital planner but wishes it would send an alarm to his phone rather than a notification. Student SLP/pt discussed using a pocket planner + digital planner so the pt is reviewing the information twice while adding it to the planners. Additionally, the efficiency of being able to quickly write something in the planner vs using his phone was discussed. Pt to look into buying a pocket planner/discuss with his mother. Student SLP sent a note home to pt's mom regarding purchasing the planner.   -progressing/continue addressing      5. Pt will complete various arthmitic/logical problem solving tasks with 85% acc given Felipe.   Current:   Logical Problem Solving: Deduction Puzzle: 40% acc CARY increasing to 65% acc given mod-max teaching/skilled instruction. Reductive Reasoning: Calendar Dates: 70% acc CARY increasing to 90% acc given mod-maxA/reps of directions. Arithmetic Word Problems: 75% acc CARY increasing to 90% acc given min-modA. Pt benefited from scratch paper/pen to compute equations. -progressing/continue addressing    Goals: to be achieved in 4 weeks:  1. Pt will recall x4/4 memory strategies and apply them to delayed recall tasks (5 words, picture retention, simple novel information) presented visually or verbally given a x5-7 minute delay with 80% acc given Felipe to improve short term memory skills for safety, QOL, ADLs, and dignity. 2. Pt will immediately recall various functional sentences, voicemails, and therapeutic activities using compensatory strategies with 80% acc given modA to improve immediate memory skills for safety, QOL, ADLs and dignity.    3. Pt will complete mental flexibility/alternating attention activities within 3 minutes with 90% acc given Felipe to improve attention for activites of choice, ADLs, and safety. 4. Pt will utilize a preferred type of planner (physical or digital) to aid in development of a routine and schedule to reduce frustration and to increase predictability and structure throughout his days with magaly.    5. Pt will complete various arthmitic/logical problem solving tasks with 85% acc given Felipe.     Long Term Goals: To be accomplished in 4 weeks  1. Pt will complete various therapeutic activities to improve immediate and short term memory, mental flexibility/alternating attention skills, planning, and problem solving with 90% acc given Felipe to improve pt's QOL, ADLs, dignity, and safety.         ASSESSMENT:   Mr. Estephanie Carrillo continues to benefit from skilled ST at this time. Mr. Estephanie Carrillo has made progress in the areas of delayed recall, mental flexibility/alternating attention, and arthmetic/logical problem skills. Pt reports things are getting \"better\" at home in regards to coordinating schedules/organization with his mother. Pt states his biggest barrier in his day-to-day life is his memory. Pt continues to benefit from skilled ST to further improve his cognitive-linguistic skills for ADLS, safety, QOL, and dignity.        ASSESSMENT/RECOMMENDATIONS:  [x]Continue therapy per initial plan/protocol at a frequency of  2 x per week for 4 weeks  []Continue therapy with the following recommended changes:_____________________      _____________________________________________________________________  []Discontinue therapy progressing towards or have reached established goals  []Discontinue therapy due to lack of appreciable progress towards goals  []Discontinue therapy due to lack of attendance or compliance  []Await Physician's recommendations/decisions regarding therapy  []Other:________________________________________________________________    Thank you for this referral.   Mejia Akers 4/18/2022 3:54 PM    NOTE TO PHYSICIAN:  PLEASE COMPLETE THE ORDERS BELOW AND   FAX TO [facility]: [phone]   If you are unable to process this request in 24 hours please contact our office: [phone]     []  I have read the above report and request that my patient continue as recommended. []  I have read the above report and request that my patient continue therapy with the following changes/special instructions:________________________________________  []I have read the above report and request that my patient be discharged from therapy.     [de-identified] Signature:____________Date:_________TIME:________    [employer], Date and Time must be completed for valid certification **

## 2022-04-19 ENCOUNTER — HOSPITAL ENCOUNTER (OUTPATIENT)
Dept: PHYSICAL THERAPY | Age: 42
Discharge: HOME | End: 2022-04-19
Payer: COMMERCIAL

## 2022-04-19 ENCOUNTER — TELEPHONE (OUTPATIENT)
Dept: PHYSICAL THERAPY | Age: 42
End: 2022-04-19

## 2022-04-19 PROCEDURE — 97110 THERAPEUTIC EXERCISES: CPT

## 2022-04-19 PROCEDURE — 97530 THERAPEUTIC ACTIVITIES: CPT

## 2022-04-19 PROCEDURE — 97112 NEUROMUSCULAR REEDUCATION: CPT

## 2022-04-19 NOTE — PROGRESS NOTES
PHYSICAL THERAPY - DAILY TREATMENT NOTE     Patient Name: Gwendolyn Ballesteros        Date: 2022  : 1980   YES Patient  Verified  Visit #:     Insurance: Payor: Dc 22 / Plan: [address] / Product Type: Managed Care Medicaid /      In time: 10:49 am Out time: 11:30    Total Treatment Time: 41     Medicare/BCBS Time Tracking (below)   Total Timed Codes (min):  - 1:1 Treatment Time: -     TREATMENT AREA =  Other abnormalities of gait and mobility [R26.89]  Other symptoms and signs involving the musculoskeletal system [R29.898]    SUBJECTIVE    Pain Level (on 0 to 10 scale): 0  / 10   Medication Changes/New allergies or changes in medical history, any new surgeries or procedures? NO    If yes, update Summary List   Subjective Functional Status/Changes:  []  No changes reported       Pt reports doing OK. Just rested over the weekend. No walking. OBJECTIVE  Modalities Rationale:   N/a     16 min Therapeutic Exercise:  [x]  See flow sheet   Rationale:      increase ROM and increase strength to improve the patients ability to perform ADLs without pain     13 min Therapeutic Activity: [x]  See flow sheet   Rationale:      increase ROM and increase strength to improve the patients ability to perform ADLs without pain     12 min Neuromuscular Re-ed: [x]  See flow sheet   Rationale:      increase ROM, increase strength and improve coordination to improve the patients ability to perform ADLs without pain       Billed With/As:   [] TE   [] TA   [] Neuro   [] Self Care Patient Education: [x] Review HEP    [] Progressed/Changed HEP based on:   [] positioning   [] body mechanics   [] transfers   [] heat/ice application    [] other:        Other Objective/Functional Measures:   Add supine hamstring stretch, advanced LE and core strengthening     Post Treatment Pain Level (on 0 to 10) scale:   0 / 10     ASSESSMENT    Assessment/Changes in Function:     VCs throughout exercise for correct form and reps     [x]  See Progress Note/Recertification   Patient will continue to benefit from skilled PT services to modify and progress therapeutic interventions, address functional mobility deficits, address ROM deficits, address strength deficits, analyze and address soft tissue restrictions, analyze and cue movement patterns and instruct in home and community integration to attain remaining goals. Progress toward goals / Updated goals:     All LTG updated goals are in progress       PLAN    [x]  Upgrade activities as tolerated YES Continue plan of care   []  Discharge due to :    []  Other:      Therapist: Roddy Oshea PTA    Date: 4/19/2022 Time: 11:30  AM     Future Appointments   Date Time Provider Myrna Taveras   4/19/2022 10:45 AM Maria D Villa PTA [facility]   4/20/2022 10:15 AM Ramonita Copeland [facility]   4/21/2022  2:15 PM Ricard Plaza [facility]   4/22/2022  9:45 AM Maria D Villa PTA [facility]   4/25/2022 10:15 AM [facility] [place] [facility]   4/26/2022  9:15 AM Maria D Villa PTA [facility]   4/27/2022 10:15 AM [address] [place] [facility]   4/28/2022  1:30 PM Fanny Patton [facility]   4/29/2022 11:45 AM Ricard Plaza [facility]   5/3/2022 10:00 AM Maria D Villa PTA [facility]   5/5/2022  9:15 AM Ricard Plaza [facility]   5/6/2022  9:00 AM Maria D Villa PTA [facility]   5/10/2022  9[facility]15 [facility]   5/12/2022 10:00 AM Maria D Villa PTA [facility]   5/13/2022  9:45 AM Maria D Villa PTA [facility]

## 2022-04-20 ENCOUNTER — APPOINTMENT (OUTPATIENT)
Dept: PHYSICAL THERAPY | Age: 42
End: 2022-04-20
Payer: COMMERCIAL

## 2022-04-21 ENCOUNTER — HOSPITAL ENCOUNTER (OUTPATIENT)
Dept: PHYSICAL THERAPY | Age: 42
Discharge: HOME | End: 2022-04-21
Payer: COMMERCIAL

## 2022-04-21 PROCEDURE — 97110 THERAPEUTIC EXERCISES: CPT

## 2022-04-21 PROCEDURE — 97535 SELF CARE MNGMENT TRAINING: CPT

## 2022-04-21 PROCEDURE — 97530 THERAPEUTIC ACTIVITIES: CPT

## 2022-04-21 PROCEDURE — 97112 NEUROMUSCULAR REEDUCATION: CPT

## 2022-04-21 NOTE — PROGRESS NOTES
PHYSICAL THERAPY - DAILY TREATMENT NOTE    Patient Name: Nayana Boateng        Date: 2022  : 1980    Patient  Verified: YES  Visit #:   14      24  Insurance: Payor: Wilmalinda 22 / Plan: [address] / Product Type: Managed Care Medicaid /      In time: 2:05 Out time: 3:00   Total Treatment Time: 55     Medicare Time Tracking (below)   Total Timed Codes (min):  - 1:1 Treatment Time:  -     TREATMENT AREA/ DIAGNOSIS = Other abnormalities of gait and mobility [R26.89]  Other symptoms and signs involving the musculoskeletal system [R29.898]    SUBJECTIVE   Pain Level (on 0 to 10 scale):  0  / 10   Medication Changes/New allergies or changes in medical history, any new surgeries or procedures? NO    If yes, update Summary List   Subjective Functional Status/Changes:  []  No changes reported     Pt reports having trouble walking still due to L foot still dragging. OBJECTIVE      15 min Therapeutic Exercise:  [x]  See flow sheet   Rationale:      increase strength and improve coordination to improve the patients ability to perform ADLs without pain         15 min Therapeutic Activity: [x]  See flow sheet   Rationale:    functional activities to improve the patients ability to perform ADLs without pain    15 min Neuromuscular Re-ed: [x]  See flow sheet   Rationale:    improve coordination, improve balance and increase proprioception to improve the patients ability to perform ADLs without pain     min Gait Training:  ___ feet with ___ device on level surfaces with ___ level of assistance   Rationale: To improve ambulation safety and efficiency in order to improve patient's ability to safely ambulate at home for self care. 10 min Self Care: Education on walking at home. HEP progression.  Education on AFO usage   Rationale:    education to improve the patients ability to self manage symptoms     Billed With/As:   [] TE   [] TA   [] Neuro   [] Self Care Patient Education: [x] Review HEP    [] Progressed/Changed HEP based on:   [] positioning   [] body mechanics   [] transfers   [] heat/ice application    [] other:        Other Objective/Functional Measures:    Minimal L ankle DF   Post Treatment Pain Level (on 0 to 10) scale:   0  / 10     ASSESSMENT  Assessment/Changes in Function:     Pt still demonstrating decrease ankle DF. Improved squat mechanics. Pt still showing decreased overall activity tolerance and muscular endurance   []  See Progress Note/Recertification   Patient will continue to benefit from skilled PT services to modify and progress therapeutic interventions, address functional mobility deficits, address ROM deficits and address strength deficits to attain remaining goals.    Progress toward goals / Updated goals:    Good Progress to    [] STG    [x] LTG  1 as shown by improved overall balance needed for ADLs     PLAN  [x]  Upgrade activities as tolerated YES Continue plan of care   []  Discharge due to :    []  Other:      Therapist: Mark Loera DPT     Date: 4/21/2022 Time: 6:59 AM        Future Appointments   Date Time Provider Myrna Taveras   4/21/2022  2:15 PM Antonio Goodpasture [facility]   4/22/2022  9:45 AM Thalia Calles PTA [facility]   4/25/2022 10:15 AM [address] [facility]   4/26/2022  9:15 AM Thalia Calles, PTA [facility]   4/27/2022 10:15 AM [address] [facility]   4/28/2022  1:30 PM Edrie Check [facility]   4/29/2022 11:45 AM Antonio Goodpasture [facility]   5/3/2022 10:00 AM Thalia Calles PTA [facility]   5/5/2022  9:15 AM Antonio Goodpasture [facility]   5/6/2022  9:00 AM Thalia Calles PTA [facility]   5/10/2022  9:15 AM Texie Goodpasture [facility]   5/12/2022 10:00 AM Thalia Calles PTA [facility]   5/13/2022  9:45 AM Thalia Calles PTA [facility]

## 2022-04-22 ENCOUNTER — HOSPITAL ENCOUNTER (OUTPATIENT)
Dept: PHYSICAL THERAPY | Age: 42
Discharge: HOME | End: 2022-04-22
Payer: COMMERCIAL

## 2022-04-22 PROCEDURE — 97530 THERAPEUTIC ACTIVITIES: CPT

## 2022-04-22 PROCEDURE — 97112 NEUROMUSCULAR REEDUCATION: CPT

## 2022-04-22 PROCEDURE — 97535 SELF CARE MNGMENT TRAINING: CPT

## 2022-04-22 PROCEDURE — 97110 THERAPEUTIC EXERCISES: CPT

## 2022-04-22 NOTE — PROGRESS NOTES
PHYSICAL THERAPY - DAILY TREATMENT NOTE     Patient Name: Anjelica Christopher        Date: 2022  : 1980   YES Patient  Verified  Visit #:   15  of   24  Insurance: Payor: Dc 22 / Plan: [address] / Product Type: Managed Care Medicaid /      In time: 9:46 am Out time: 10:34   Total Treatment Time: 48     Medicare/BCBS Time Tracking (below)   Total Timed Codes (min):  - 1:1 Treatment Time: -     TREATMENT AREA =  Other abnormalities of gait and mobility [R26.89]  Other symptoms and signs involving the musculoskeletal system [R29.898]    SUBJECTIVE    Pain Level (on 0 to 10 scale): 0 / 10   Medication Changes/New allergies or changes in medical history, any new surgeries or procedures?     NO    If yes, update Summary List   Subjective Functional Status/Changes:  []  No changes reported       Pt reports a little soreness in his thighs after yesterdays session         OBJECTIVE  Modalities Rationale: n/a    17 min Therapeutic Exercise:  [x]  See flow sheet   Rationale:      increase ROM and increase strength to improve the patients ability to perform ADLs without pain    9 min Therapeutic Activity: [x]  See flow sheet   Rationale:      increase ROM, increase strength, improve coordination and increase proprioception to improve the patients ability to perform ADLs without pain    12 min Neuromuscular Re-ed: [x]  See flow sheet   Rationale:      increase ROM, improve coordination, improve balance and increase proprioception to improve the patients ability to perform ADLs without pain      10 min Billed With/As:   [] TE   [] TA   [] Neuro   [] Self Care Patient Education: [x] Review HEP,  Emphasized HEP, safety in ADLs   [] Progressed/Changed HEP based on:   [] positioning   [] body mechanics   [] transfers   [] heat/ice application    [] other:        Other Objective/Functional Measures:  Decrease exercise per flow sheet secondary to ms soreness JEFF quads  Resume rec bike- without braces     Post Treatment Pain Level (on 0 to 10) scale:  0 / 10     ASSESSMENT    Assessment/Changes in Function:     Pt requiring occasional CGA for recovery in static balance exercise  Pt able to perform partial - almost 90 degrees squat in parallel bars with intermittent UE support     []  See Progress Note/Recertification   Patient will continue to benefit from skilled PT services to modify and progress therapeutic interventions, address functional mobility deficits, address ROM deficits, address strength deficits, analyze and address soft tissue restrictions, analyze and cue movement patterns, analyze and modify body mechanics/ergonomics, assess and modify postural abnormalities, address imbalance/dizziness and instruct in home and community integration to attain remaining goals.       Progress toward goals / Updated goals:    Good Progress to    [] STG    [x] LTG  1, 2 as shown by improving stability in static and dynamic exercise, LE strength for functional ADLs       PLAN    [x]  Upgrade activities as tolerated YES Continue plan of care   []  Discharge due to :    []  Other:      Therapist: Manjit Darling PTA    Date: 4/22/2022 Time: 10:34 AM     Future Appointments   Date Time Provider Myrna Taveras   4/25/2022 10:15 AM [address] [facility]   4/26/2022  9:15 AM Ebb Darragh, PTA [facility]   4/27/2022 10:15 AM [address] [facility]   4/28/2022  1:30 PM Shanon Lozaspan [facility]   4/29/2022 11:45 AM Horald Pintos [facility]   5/3/2022 10:00 AM Ebb Darragh, PTA [facility]   5/5/2022  9:15 AM Horald Pintos [facility]   5/6/2022  9:00 AM Ebb Darragh, PTA [facility]   5/10/2022  9:15 AM Horald Pintos [facility]   5/12/2022 10:00 AM Ebb Darragh, SUKUMAR [facility]   5/13/2022  9:45 AM Ebb Darragh, PTA [facility]

## 2022-04-25 ENCOUNTER — HOSPITAL ENCOUNTER (OUTPATIENT)
Dept: PHYSICAL THERAPY | Age: 42
Discharge: HOME | End: 2022-04-25
Payer: COMMERCIAL

## 2022-04-25 PROCEDURE — 92507 TX SP LANG VOICE COMM INDIV: CPT

## 2022-04-25 NOTE — PROGRESS NOTES
DAILY TREATMENT NOTE    Patient Name: Andre Israel  Date:2022  : 1980  [x]  Patient  Verified  Payor: Payor: Dc 22 / Plan: [address] / Product Type: Managed Care Medicaid /   In time: 10:17  Out time: 11:00  Total Treatment Time (min): 43 minutes  Visit #: 1 of 8  *PN due 22*    Treatment Diagnosis: Unspecified symptoms and signs involving cognitive functions and awareness [R41.9]    SUBJECTIVE  Pain Level (0-10 scale): 0  Any medication changes, allergies to medications, adverse drug reactions, diagnosis change, or new procedure performed?: [x] No    [] Yes (see summary sheet for update)    Subjective functional status/changes:   [] No changes reported  Pt arrived to  this date stating he is concerned about finding his \"disability paperwork\", stating he does not know who to follow up with regarding getting a copy. SLP referred pt to check with his PCP/neuro regarding the paperwork as it is typically done through the state and an MD needs to sign off on. SLP to phone pt's mother this date regarding scheduling for additional appointments and purchasing a physical planner for pt. OBJECTIVE  Treatment provided includes:  Increase/Improve:  []  Voice Quality [x]  Cognitive Linguistic Skills []  Laryngeal/Pharyngeal Exercises   []  Vocal Loudness []  Reading Comprehension []  Swallowing Skills    []  Vocal Cord Function []  Auditory Comprehension []  Oral Motor Skills   []  Resonance []  Writing Skills [x]  Compensatory strategies    []  Speech Intelligibility []  Expressive Language [x]  Attention   []  Breath Support/Coord.  []  Receptive language [x]  Memory   []  Articulation []  Safety Awareness [x] Pt Education    []  Fluency []  Word Retrieval []        Treatment Provided:  -Pt Education  -Planning   -Delayed Recall  -Alternating Attention  -Arthmitic Problem Solving     Patient/Caregiver  Education: [x] Review HEP      HEP/Handouts given: Money Problem Solving    Pain Level (0-10 scale) post treatment: 0    ASSESSMENT   []   Improving appropriately and progressing toward goals  [x]   Improving slowly and progressing toward goals  []   Approximating goals/maximum potential  [x]   Continues to benefit from skilled therapy to address remaining functional deficits  []   Not progressing toward goals and plan of care will be adjusted    Patient will continue to benefit from skilled therapy to address remaining functional deficits: cognitive-linguistic     Progress towards goals / Updated goals:  1. Pt will recall x4/4 memory strategies and apply them to delayed recall tasks (5 words, picture retention, simple novel information) presented visually or verbally given a x5-7 minute delay with 80% acc given Felipe to improve short term memory skills for safety, QOL, ADLs, and dignity. Current 4/25/22:  Strategies Recalled: 3/4 recalled CARY; 4/4 with Felipe. Simple Novel Information: Given a x5 min delay, pt recalled 0% of the story, stating \"I don't remember any of it\". Given modA/open-ended questions, pt recalled info with 25% acc. Given maxA, pt recalled info with 63%. Pt used the comp strategies \"write it down\" and \"repetition\". 2. Pt will immediately recall various functional sentences, voicemails, and therapeutic activities using compensatory strategies with 80% acc given modA to improve immediate memory skills for safety, QOL, ADLs and dignity. Current 4/25/22: Not targeted this date. 3. Pt will complete mental flexibility/alternating attention activities within 3 minutes with 90% acc given Felipe to improve attention for activites of choice, ADLs, and safety. Current 4/25/22:  Alternating Attention Between Letters & Numbers: Within x3 mins: 40% acc CARY. Within x9 minutes: 90% acc with mod-maxA/[place].      4. Pt will utilize a preferred type of planner (physical or digital) to aid in development of a routine and schedule to reduce frustration and to increase predictability and structure throughout his days with magaly. Current 22: SLP left voicemail with pt's mother regarding purchasing a physical planner this date. Simulated Planning: Pt tasked to organize listed events in a mock planner by time of day (morning/evening/night). With mod-max assist for set up; 100% acc CARY.       5. Pt will complete various arthmitic/logical problem solving tasks with 85% acc given Felipe.   Current 22:  Arithmetic Problem Solvin% acc CARY increasing to 80% acc given modA.      PLAN  [x]  Continue plan of care  []  Modify Goals/Treatment Plan      []  Discharge due to:  [] Other      David Marquez 2022  9:05 AM    Future Appointments   Date Time Provider Myrna Taveras   2022 10:15 AM [address] [facility]   2022  9:15 AM Maksim Junk, PTA [facility]   2022 10:15 AM [address] [facility]   2022  1:30 PM Harding Golas [facility]   2022 11:45 AM Harding Golas [facility]   5/3/2022 10:00 AM Maksim Junk, PTA [facility]   2022  9:15 AM Harding Golas [facility]   2022  9:00 AM Maksim Junk, PTA [facility]   5/10/2022  9:15 AM Harding Golas [facility]   2022 10:00 AM Maksim Junk, PTA [facility]   2022  9:45 AM Maksim Junk, PTA [facility]

## 2022-04-26 ENCOUNTER — HOSPITAL ENCOUNTER (OUTPATIENT)
Dept: PHYSICAL THERAPY | Age: 42
Discharge: HOME | End: 2022-04-26
Payer: COMMERCIAL

## 2022-04-26 PROCEDURE — 97110 THERAPEUTIC EXERCISES: CPT

## 2022-04-26 PROCEDURE — 97535 SELF CARE MNGMENT TRAINING: CPT

## 2022-04-26 PROCEDURE — 97112 NEUROMUSCULAR REEDUCATION: CPT

## 2022-04-26 PROCEDURE — 97530 THERAPEUTIC ACTIVITIES: CPT

## 2022-04-26 NOTE — PROGRESS NOTES
PHYSICAL THERAPY - DAILY TREATMENT NOTE     Patient Name: Anjelica Christopher        Date: 2022  : 1980   YES Patient  Verified  Visit #:     Insurance: Payor: Dc 22 / Plan: [address] / Product Type: Managed Care Medicaid /      In time: 9:15 am Out time: 10:01 am   Total Treatment Time: 46     Medicare/BCBS Time Tracking (below)   Total Timed Codes (min):  - 1:1 Treatment Time:  -     TREATMENT AREA =  Other abnormalities of gait and mobility [R26.89]  Other symptoms and signs involving the musculoskeletal system [R29.898]    SUBJECTIVE    Pain Level (on 0 to 10 scale): 0  / 10   Medication Changes/New allergies or changes in medical history, any new surgeries or procedures?     NO    If yes, update Summary List   Subjective Functional Status/Changes:  []  No changes reported       Pt reports he has been performing his HEP         OBJECTIVE  Modalities Rationale:   N/a     16 min Therapeutic Exercise:  [x]  See flow sheet   Rationale:      increase ROM and increase strength to improve the patients ability to  perform functional ADLs      9 min Therapeutic Activity: [x]  See flow sheet   Rationale:      increase ROM, increase strength and improve coordination to improve the patients ability to  perform functional ADLs      12 min Neuromuscular Re-ed: [x]  See flow sheet   Rationale:      improve coordination, improve balance and increase proprioception to improve the patients ability to perform functional ADLs          9 min Billed With/As:   [x] TE   [x] TA   [] Neuro   [] Self Care Patient Education: [x] Review HEP, DC planning, self ex progression as tolerated    [] Progressed/Changed HEP based on:   [] positioning   [] body mechanics   [] transfers   [] heat/ice application    [] other:        Other Objective/Functional Measures:  Discussed discharge planning  Advanced LE strengthening per flow sheet     Post Treatment Pain Level (on 0 to 10) scale:  0  / 10     ASSESSMENT    Assessment/Changes in Function:   Pt demonstrating mild-moderate challenge in side step ups without UE asssit     []  See Progress Note/Recertification   Patient will continue to benefit from skilled PT services to modify and progress therapeutic interventions, address functional mobility deficits, address ROM deficits, address strength deficits, analyze and address soft tissue restrictions, analyze and cue movement patterns, analyze and modify body mechanics/ergonomics, assess and modify postural abnormalities, address imbalance/dizziness and instruct in home and community integration to attain remaining goals.       Progress toward goals / Updated goals:    Good Progress to    [] STG    [x] LTG  1 as shown by progressed LE strengthening and dynamic balance, pt demonstrating improving quad control in exercise       PLAN    [x]  Upgrade activities as tolerated YES Continue plan of care   []  Discharge due to :    []  Other:      Therapist: Jeferson Hernandez PTA    Date: 4/26/2022 Time: 10:01 AM     Future Appointments   Date Time Provider Myrna Taveras   4/27/2022 10:15 AM Ramonita Copeland [facility]   4/28/2022  1:30 PM Zelalem Peraza [facility]   4/29/2022 11:45 AM Trecia Davidson [facility]   5/3/2022 10:00 AM Bettina Carroll PTA [facility]   5/5/2022  9:15 AM Trecia Davidson [facility]   5/6/2022  9:00 AM Bettina Carroll PTA [facility]   5/10/2022  9:15 AM Trecia Davidson [facility]   5/12/2022 10:00 AM Bettina Carroll PTA [facility]   5/13/2022  9:45 AM Bettina Carroll PTA [facility]

## 2022-04-27 ENCOUNTER — HOSPITAL ENCOUNTER (OUTPATIENT)
Dept: PHYSICAL THERAPY | Age: 42
Discharge: HOME | End: 2022-04-27
Payer: COMMERCIAL

## 2022-04-27 PROCEDURE — 92507 TX SP LANG VOICE COMM INDIV: CPT

## 2022-04-27 NOTE — PROGRESS NOTES
DAILY TREATMENT NOTE    Patient Name: Lyubov Carver  Date:2022  : 1980  [x]  Patient  Verified  Payor: Payor: Dc  / Plan: [address] / Product Type: Managed Care Medicaid /   In time: 10:15  Out time: 11:00  Total Treatment Time (min): 45 minutes  Visit #: 2 of 8  *PN due 22*    Treatment Diagnosis: Unspecified symptoms and signs involving cognitive functions and awareness [R41.9]    SUBJECTIVE  Pain Level (0-10 scale): 0  Any medication changes, allergies to medications, adverse drug reactions, diagnosis change, or new procedure performed?: [x] No    [] Yes (see summary sheet for update)    Subjective functional status/changes:   [] No changes reported  Pt arrived to  this date stating he is concerned about finding his \"disability paperwork\", stating he does not know who to follow up with regarding getting a copy. SLP referred pt to check with his PCP/neuro regarding the paperwork as it is typically done through the state and an MD needs to sign off on. SLP to phone pt's mother this date regarding scheduling for additional appointments and purchasing a physical planner for pt. OBJECTIVE  Treatment provided includes:  Increase/Improve:  []  Voice Quality [x]  Cognitive Linguistic Skills []  Laryngeal/Pharyngeal Exercises   []  Vocal Loudness []  Reading Comprehension []  Swallowing Skills    []  Vocal Cord Function []  Auditory Comprehension []  Oral Motor Skills   []  Resonance []  Writing Skills [x]  Compensatory strategies    []  Speech Intelligibility []  Expressive Language [x]  Attention   []  Breath Support/Coord.  []  Receptive language [x]  Memory   []  Articulation []  Safety Awareness [x] Pt Education    []  Fluency []  Word Retrieval []        Treatment Provided:  -Pt Education  -executive function: calender/ weekly planner   -Delayed Recall  -mental flexibility  - Time management Problem Solving     Patient/Caregiver  Education: [x] Review HEP      HEP/Handouts given: Money Problem Solving    Pain Level (0-10 scale) post treatment: 0    ASSESSMENT   []   Improving appropriately and progressing toward goals  [x]   Improving slowly and progressing toward goals  []   Approximating goals/maximum potential  [x]   Continues to benefit from skilled therapy to address remaining functional deficits  []   Not progressing toward goals and plan of care will be adjusted    Patient will continue to benefit from skilled therapy to address remaining functional deficits: cognitive-linguistic     Progress towards goals / Updated goals:  1. Pt will recall x4/4 memory strategies and apply them to delayed recall tasks (5 words, picture retention, simple novel information) presented visually or verbally given a x5-7 minute delay with 80% acc given Felipe to improve short term memory skills for safety, QOL, ADLs, and dignity. Current 22: Pt able to recall x4 comp strategies using \"WRAP\". Facilitated picture retention tasks using picture it and repetition given mod-maxA for set up, pt then completed task with 3 min delay, yieldin% acc and 90% acc given additional cues. Re-teaching to promote carryover complete with 2nd picture rentention task: 50% acc with Felipe for set up and increasing to 79% acc given modA/redirection. 2. Pt will immediately recall various functional sentences, voicemails, and therapeutic activities using compensatory strategies with 80% acc given modA to improve immediate memory skills for safety, QOL, ADLs and dignity. Current 22: Address next session    3. Pt will complete mental flexibility/alternating attention activities within 3 minutes with 90% acc given Felipe to improve attention for activites of choice, ADLs, and safety. Current 22: Mental flexibility: (written/visual presentation) 90% acc.      4. Pt will utilize a preferred type of planner (physical or digital) to aid in development of a routine and schedule to reduce frustration and to increase predictability and structure throughout his days with magaly. Current 4/27/22:  Pt reports he is unsure if his Mom received SLP's voicemail . He reports he was frustrated in the fact she mentioned something to him about it as she was walking out the door, but he is unable to recall. SLP redirected hiunm asking what he could have done differently. Pt reported his mom could write things down. Teaching session on habit formation, confirming conveyed message and pt to write it down immediately. SLP printed a weekly planner template and monthly calendar for pt to trial. Pt is instructed to write down scheduling appointments and a pocket planner with his mother. All content was placed in a folder in which pt was instructed to keep with him so he can write things down as warranted. Re-educ provided again at the end session.      5. Pt will complete various arthmitic/logical problem solving tasks with 85% acc given Felipe.   Current 4/27/22:  Time management Problem Solving (in written/visual format): 1000% acc magaly    PLAN  [x]  Continue plan of care  []  Modify Goals/Treatment Plan      []  Discharge due to:  [] Other      KAYLA Maurer 4/27/2022  9:05 AM  MA, CCC-SLP  Speech-Language Pathologist    Future Appointments   Date Time Provider Myrna Taveras   4/27/2022 10:15 AM [address] [facility]   4/28/2022  1:30 PM Letha Failing [facility]   4/29/2022 11:45 AM Davie Dukes [facility]   5/3[facility]2022 10:00 [facility]   5/5/2022  9:15 AM Davie Dukes [facility]   5/6/2022  9:00 AM Loida Tobar, PTA [facility]   5/10/2022  9:15 AM Davie Dukes [facility]   5/12/2022 10:00 AM Loida Tobar PTA [facility]   5/13/2022  9:45 AM Loida Tobar PTA [facility]

## 2022-04-28 ENCOUNTER — HOSPITAL ENCOUNTER (OUTPATIENT)
Dept: PHYSICAL THERAPY | Age: 42
Discharge: HOME | End: 2022-04-28
Payer: COMMERCIAL

## 2022-04-28 PROCEDURE — 97530 THERAPEUTIC ACTIVITIES: CPT

## 2022-04-28 PROCEDURE — 97112 NEUROMUSCULAR REEDUCATION: CPT

## 2022-04-28 PROCEDURE — 97110 THERAPEUTIC EXERCISES: CPT

## 2022-04-28 NOTE — PROGRESS NOTES
PHYSICAL THERAPY - DAILY TREATMENT NOTE    Patient Name: Anjelica Christopher        Date: 2022  : 1980    Patient  Verified: YES  Visit #:     Insurance: Payor: Wilmalinda 22 / Plan: [address] / Product Type: Managed Care Medicaid /      In time: 1:30 Out time: 2:15   Total Treatment Time: 45     Medicare Time Tracking (below)   Total Timed Codes (min):  - 1:1 Treatment Time:  -     TREATMENT AREA/ DIAGNOSIS = Other abnormalities of gait and mobility [R26.89]  Other symptoms and signs involving the musculoskeletal system [R29.898]    SUBJECTIVE   Pain Level (on 0 to 10 scale):  0  / 10   Medication Changes/New allergies or changes in medical history, any new surgeries or procedures?     NO    If yes, update Summary List   Subjective Functional Status/Changes:  []  No changes reported     See PN      OBJECTIVE    15 min Therapeutic Exercise:  [x]  See flow sheet   Rationale:      increase ROM and increase strength to improve the patients ability to perform ADLs without pain       15 min Therapeutic Activity: [x]  See flow sheet   Rationale:    functional activities to improve the patients ability to perform ADLs without pain    15 min Neuromuscular Re-ed: [x]  See flow sheet   Rationale:    improve coordination, improve balance and increase proprioception to improve the patients ability to perform ADLs without pain    Billed With/As:   [x] TE   [] TA   [] Neuro   [] Self Care Patient Education: [x] Review HEP    [] Progressed/Changed HEP based on:   [] positioning   [] body mechanics   [] transfers   [] heat/ice application    [] other:        Other Objective/Functional Measures:    See PN   Post Treatment Pain Level (on 0 to 10) scale:   0  / 10     ASSESSMENT  Assessment/Changes in Function:     See PN     []  See Progress Note/Recertification   Patient will continue to benefit from skilled PT services to modify and progress therapeutic interventions, address functional mobility deficits, address strength deficits, analyze and address soft tissue restrictions, analyze and cue movement patterns and analyze and modify body mechanics/ergonomics to attain remaining goals.    Progress toward goals / Updated goals:      See PN     PLAN  [x]  Upgrade activities as tolerated YES Continue plan of care   []  Discharge due to :    []  Other:      Therapist: Humera Tang DPT     Date: 4/28/2022 Time: 1:45 PM        Future Appointments   Date Time Provider Myrna Taveras   4/29/2022 11:45 AM Kathaleen Sessions [facility]   5/3/2022 10:00 AM Samreen Rueda PTA [facility]   5/4/2022  9:30 AM [address] [facility]   5/5/2022  9:15 AM Kathaleen Sessions [facility]   5/6/2022  9:00 AM Samreen Rueda PTA [facility]   5/9/2022  9:30 AM [address] [place] [facility]   5/10/2022  9:15 AM Kathaleen Sessions [facility]   5/11/2022  9:30 AM [address] [place] [facility]   5/12/2022 10:00 AM Samreen Rueda PTA [facility]   5/13/2022  9:45 AM Samreen Rueda PTA [facility]   5/16/2022  9:30 AM [address] [place] [facility]   5/18/2022  9:30 AM [address] [facility]   5/23/2022  9:30 AM [address] [facility]   5/25/2022  9:30 AM [address] [facility]   6/1/2022  9:30 AM [address] [facility]   6/6/2022  9:30 AM [address] [facility]   6/8/2022  9:30 AM [address] [facility]   6/13/2022  9:30 AM [address] [facility]   6/15/2022  9:30 AM [address] [facility]

## 2022-04-28 NOTE — PROGRESS NOTES
[address] [facility] PHYSICAL THERAPY AT [facility] [address], [address]  Phone: [phone]  Fax: [phone]  PROGRESS NOTE  Patient Name: Latrell Hu : 1980   Treatment/Medical Diagnosis: Other abnormalities of gait and mobility [R26.89]  Other symptoms and signs involving the musculoskeletal system [R29.898]   Referral Source: Jyoti Bravo MD     Date of Initial Visit: 2022 Attended Visits: 17 Missed Visits: -     SUMMARY OF TREATMENT  PT consisted of manual therapy techniques, therapeutic exercises, and modalities to improve strength, improve mobility, decrease pain, and improve overall function. CURRENT STATUS  Pt is s/p multiple fasciotomies in 2021 with generalized B lower extremity weakness resulting in difficulty walking and increased risk of falling. Pt still ambulating with B AFOs and quad cane. Notable MMT R;L hip abduction: 4-/5;4-/5, hip ext: 4/5;4/5, knee flexion 5-/5;5-/5, Ankle DF: 4-/5; 1+/5. Pt showing improved R DF strength and slightly better L DF strength. Pt still showing overall decreased balance with walking and dynamic tasks. Goal/Measure of Progress Goal Met? 1. Increase score on FOTO to > or = to 60 points to demo an increase in functional activity tolerance with the LE. Status at last Eval: 45 Current Status: 46 no   2. Pt will demonstrate a GROC score of >/= +5 to show overall improvement in function   Status at last Eval: +4 Current Status: +4 no   3. Pt to ambulate with AFOs with no AD for > 250ft to show improved independence with ambulation   Status at last Eval: Walking with AD Current Status: Walking with AD no     New Goals to be achieved in __4__  weeks:  1. Continue goals above   2.  -   3.  -   4.  -     RECOMMENDATIONS  Pt to continue PT to improve overall strength and balance needed for safe ADLs  If you have any questions/comments please contact us directly at [phone]. Thank you for allowing us to assist in the care of your patient. Therapist Signature: Stanton Mejia Date: 4/28/2022     Time: 1:45 PM   NOTE TO PHYSICIAN:  PLEASE COMPLETE THE ORDERS BELOW AND FAX TO   [facility] Physical Therapy at [address]: [phone]. If you are unable to process this request in 24 hours please contact our office: [phone].    ___ I have read the above report and request that my patient continue as recommended.   ___ I have read the above report and request that my patient continue therapy with the following changes/special instructions:_________________________________________________________   ___ I have read the above report and request that my patient be discharged from therapy.      Physician Signature:        Date:       Time:        Kandy Alcocer MD

## 2022-04-29 ENCOUNTER — HOSPITAL ENCOUNTER (OUTPATIENT)
Dept: PHYSICAL THERAPY | Age: 42
Discharge: HOME | End: 2022-04-29
Payer: COMMERCIAL

## 2022-04-29 PROCEDURE — 97530 THERAPEUTIC ACTIVITIES: CPT

## 2022-04-29 PROCEDURE — 97110 THERAPEUTIC EXERCISES: CPT

## 2022-04-29 PROCEDURE — 97112 NEUROMUSCULAR REEDUCATION: CPT

## 2022-04-29 NOTE — PROGRESS NOTES
PHYSICAL THERAPY - DAILY TREATMENT NOTE    Patient Name: Oanh Ba        Date: 2022  : 1980    Patient  Verified: YES  Visit #:   57   of   24  Insurance: Payor: Dc 22 / Plan: [address] / Product Type: Managed Care Medicaid /      In time: 11:45 Out time: 12:25   Total Treatment Time: 40     Medicare Time Tracking (below)   Total Timed Codes (min):  - 1:1 Treatment Time:  -     TREATMENT AREA/ DIAGNOSIS = Other abnormalities of gait and mobility [R26.89]  Other symptoms and signs involving the musculoskeletal system [R29.898]    SUBJECTIVE   Pain Level (on 0 to 10 scale):  0  / 10   Medication Changes/New allergies or changes in medical history, any new surgeries or procedures? NO    If yes, update Summary List   Subjective Functional Status/Changes:  []  No changes reported     Pt reports difficulty walking. Pt states hes not using the AFOs as often around the house      OBJECTIVE    15 min Therapeutic Exercise:  [x]  See flow sheet   Rationale:      increase strength and improve coordination to improve the patients ability to perform ADLs without pain       10 min Therapeutic Activity: [x]  See flow sheet   Rationale:    functional activiites to improve the patients ability to perform ADLs without pain      15 min Neuromuscular Re-ed: [x]  See flow sheet   Rationale:    improve balance and increase proprioception to improve the patients ability to perform ADLs without pain    Billed With/As:   [x] TE   [] TA   [] Neuro   [] Self Care Patient Education: [x] Review HEP    [] Progressed/Changed HEP based on:   [] positioning   [] body mechanics   [] transfers   [] heat/ice application    [] other:        Other Objective/Functional Measures:    Decreased DF strength still.  Initiated lateral band walking in parallel bars   Post Treatment Pain Level (on 0 to 10) scale:   0  / 10     ASSESSMENT  Assessment/Changes in Function:     Pt still showing overall decreased coordination. Pt also showing decreased static balance due to weakness at the Ankles     []  See Progress Note/Recertification   Patient will continue to benefit from skilled PT services to modify and progress therapeutic interventions, address functional mobility deficits, address ROM deficits, address strength deficits, analyze and address soft tissue restrictions, analyze and cue movement patterns and analyze and modify body mechanics/ergonomics to attain remaining goals.    Progress toward goals / Updated goals:    Good Progress to    [] STG    [x] LTG  1 as shown by improved overall functional strength      PLAN  [x]  Upgrade activities as tolerated YES Continue plan of care   []  Discharge due to :    []  Other:      Therapist: Jama Alpers ,DPT     Date: 4/29/2022 Time: 1:54 PM        Future Appointments   Date Time Provider Myrna Taveras   5/3/2022 10:00 AM Maria D Villa PTA [facility]   5/4/2022  9:30 AM [address] [facility]   5/5/2022  9:15 AM Ricard Plaza [facility]   5/6/2022  9:00 AM Maria D Villa PTA [facility]   5/9/2022  9:30 AM [address] [place] [facility]   5/10/2022  9:15 AM Ricard Plaza [facility]   5/11/2022  9:30 AM [address] [place] [facility]   5/12/2022 10:00 AM Maria D Villa PTA [facility]   5/13/2022  9:45 AM Maria D Villa PTA [facility]   5/16/2022  9:30 AM [address] [place] [facility]   5/18/2022  9:30 AM [address] [facility]   5/23/2022  9:30 AM [address] [facility]   5/25/2022  9:30 AM [address] [facility]   6/1/2022  9:30 AM [address] [facility]   6/6/2022  9:30 AM [address] [facility]   6/8/2022  9:30 AM [address] [facility]   6/13/2022  9:30 AM [address] [facility]   6/15/2022  9:30 AM [address] [facility]

## 2022-05-03 ENCOUNTER — APPOINTMENT (OUTPATIENT)
Dept: PHYSICAL THERAPY | Age: 42
End: 2022-05-03
Payer: MEDICAID

## 2022-05-04 ENCOUNTER — APPOINTMENT (OUTPATIENT)
Dept: PHYSICAL THERAPY | Age: 42
End: 2022-05-04
Payer: MEDICAID

## 2022-05-05 ENCOUNTER — HOSPITAL ENCOUNTER (OUTPATIENT)
Dept: PHYSICAL THERAPY | Age: 42
Discharge: HOME | End: 2022-05-05
Payer: MEDICAID

## 2022-05-05 PROCEDURE — 97110 THERAPEUTIC EXERCISES: CPT

## 2022-05-05 PROCEDURE — 97530 THERAPEUTIC ACTIVITIES: CPT

## 2022-05-05 PROCEDURE — 97535 SELF CARE MNGMENT TRAINING: CPT

## 2022-05-05 PROCEDURE — 97112 NEUROMUSCULAR REEDUCATION: CPT

## 2022-05-05 NOTE — PROGRESS NOTES
PHYSICAL THERAPY - DAILY TREATMENT NOTE     Patient Name: Deborah Anderson        Date: 2022  : 1980   YES Patient  Verified  Visit #:     Insurance: Payor: Dc  / Plan: [address] / Product Type: Managed Care Medicaid /      In time: 9:15 Out time: 10:15   Total Treatment Time: 60     Medicare/BCBS Time Tracking (below)   Total Timed Codes (min):  - 1:1 Treatment Time:  -     TREATMENT AREA =  Other abnormalities of gait and mobility [R26.89]  Other symptoms and signs involving the musculoskeletal system [R29.898]    SUBJECTIVE    Pain Level (on 0 to 10 scale):  0  / 10   Medication Changes/New allergies or changes in medical history, any new surgeries or procedures? NO    If yes, update Summary List   Subjective Functional Status/Changes:  []  No changes reported       Functional improvements: no falls  Pt reports he forgot his braces today.          OBJECTIVE  Modalities Rationale:  N/a      20 min Therapeutic Exercise:  [x]  See flow sheet   Rationale:      increase ROM and increase strength to improve the patients ability to improve strength & balance for community ambulation     9 min Therapeutic Activity: [x]  See flow sheet   Rationale:      increase ROM, increase strength, improve coordination and increase proprioception to improve the patients ability to improve strength & balance  for community ambulation          23 min Neuromuscular Re-ed: [x]  See flow sheet   Rationale:      increase ROM, increase strength, improve coordination, improve balance and increase proprioception to improve the patients ability to improve strength & balance  for community ambulation         8 min Self Care: Review written HEP, self progression of exercise, safety in performing HEP   Rationale:    increase ROM, increase strength, improve coordination, improve balance and increase proprioception to improve the patients ability to perform functional ADLs safely    Billed With/As:   [] TE   [] TA   [] Neuro   [] Self Care Patient Education: [x] Review HEP    [] Progressed/Changed HEP based on:   [] positioning   [] body mechanics   [] transfers   [] heat/ice application    [] other:        Other Objective/Functional Measures: Add TM amb x 4 min  Updated written HEP   Post Treatment Pain Level (on 0 to 10) scale:   0 / 10     ASSESSMENT    Assessment/Changes in Function:   Pt demonstrating improving use of hip and ankle balance strategy in all exercises without use of braces & SBQC. Add TM to progress community ambulation. Mild fatigue with exercises     []  See Progress Note/Recertification   Patient will continue to benefit from skilled PT services to modify and progress therapeutic interventions, address functional mobility deficits, address ROM deficits, address strength deficits, analyze and address soft tissue restrictions, analyze and cue movement patterns, address imbalance/dizziness and instruct in home and community integration to attain remaining goals.       Progress toward goals / Updated goals:    Good Progress to    [] STG    [x] LTG  1 as shown by improving safety in ambulation without brace and SBQC       PLAN    [x]  Upgrade activities as tolerated YES Continue plan of care   []  Discharge due to :    []  Other:      Therapist: Jose Manuel White PTA    Date: 5/5/2022 Time: 10:52 AM     Future Appointments   Date Time Provider Myrna Taveras   5/6/2022  9:00 AM Susan Ramirez [place] [address] [address]   5/9/2022  9:30 AM [address]   5/10/2022  [facility] [address]   5/11/2022  9:30 AM [address]   5/12/2022 10:00 AM Susan Ramirez PTA [place] [address] [address]   5/13/2022  9:45 AM Susan Ramirez PTA [facility] [address]   5/16/2022  9:30 AM [address] [place] [address] [address]   5/18/2022  9:30 AM [address] [place] [address] [address]   5/23/2022  9:30 AM [address]   5/25/2022  9:30 AM [address] Iv[address] Engel PT [facility]   6/1/2022  9:30 AM [address] [facility]   6/6/2022  9:30 AM [address] [facility]   6/8/2022  9:30 AM [facility] PT [facility]   6/13/2022  9:30 AM [facility] PT [facility]   6/15/2022  9:30 AM Meli

## 2022-05-06 ENCOUNTER — HOSPITAL ENCOUNTER (OUTPATIENT)
Dept: PHYSICAL THERAPY | Age: 42
Discharge: HOME | End: 2022-05-06
Payer: MEDICAID

## 2022-05-06 PROCEDURE — 97110 THERAPEUTIC EXERCISES: CPT

## 2022-05-06 PROCEDURE — 97112 NEUROMUSCULAR REEDUCATION: CPT

## 2022-05-06 PROCEDURE — 97530 THERAPEUTIC ACTIVITIES: CPT

## 2022-05-06 NOTE — PROGRESS NOTES
PHYSICAL THERAPY - DAILY TREATMENT NOTE     Patient Name: Simon Colon        Date: 2022  : 1980   YES Patient  Verified  Visit #:     Insurance: Payor: Dc 22 / Plan: [address] / Product Type: Managed Care Medicaid /      In time: 9:55  Out time: 10:45   Total Treatment Time: 50     Medicare/BCBS Time Tracking (below)   Total Timed Codes (min): - 1:1 Treatment Time: -     TREATMENT AREA =  Other abnormalities of gait and mobility [R26.89]  Other symptoms and signs involving the musculoskeletal system [R29.898]    SUBJECTIVE    Pain Level (on 0 to 10 scale):  0 / 10   Medication Changes/New allergies or changes in medical history, any new surgeries or procedures? NO    If yes, update Summary List   Subjective Functional Status/Changes:  []  No changes reported     Pt reports mild LE fatigue today         OBJECTIVE  Modalities Rationale:   N/a    27 min Therapeutic Exercise:  [x]  See flow sheet   Rationale:      increase ROM and increase strength to improve the patients ability to perform community ambulation with least restrictive AD     10 min Therapeutic Activity: [x]  See flow sheet   Rationale:      increase ROM, increase strength and improve coordination to improve the patients ability to decrease fall risk     13 min Neuromuscular Re-ed: [x]  See flow sheet   Rationale:      increase strength, improve coordination, improve balance and increase proprioception to improve the patients ability to decrease fall risk      Billed With/As:   [] TE   [] TA   [] Neuro   [] Self Care Patient Education: [x] Review HEP    [] Progressed/Changed HEP based on:   [] positioning   [] body mechanics   [] transfers   [] heat/ice application    [] other:        Other Objective/Functional Measures:    Progressed TM ambulation x 5 min ; mild fatigue with L LE . However pt demonstrating improving ankle/knee control in ambulation.   All exercises performed without braces     Post Treatment Pain Level (on 0 to 10) scale:  0 / 10     ASSESSMENT    Assessment/Changes in Function:   Pt able to complete almost 1/2 squat with good quad control     []  See Progress Note/Recertification   Patient will continue to benefit from skilled PT services to modify and progress therapeutic interventions, address functional mobility deficits, address ROM deficits, address strength deficits, analyze and address soft tissue restrictions, analyze and cue movement patterns, analyze and modify body mechanics/ergonomics, address imbalance/dizziness and instruct in home and community integration to attain remaining goals.       Progress toward goals / Updated goals:    Good Progress to    [] STG    [x] LTG  1 as shown by improving functional mobility in ADLs, LE strength and flexibility       PLAN    [x]  Upgrade activities as tolerated YES Continue plan of care   []  Discharge due to :    []  Other:      Therapist: Surjit Quiñones PTA    Date: 5/6/2022 Time: 10:45 am     Future Appointments   Date Time Provider Myrna Taveras   5/9/2022  9:30 AM [address] [facility]   5/10/2022  9:15 AM Velasquez Shingles [facility]   5/11/2022  9:30 AM [address] [facility]   5/12/2022 10:00 AM Allen Guevara PTA [facility]   5/13/2022  9:45 AM Allen Guevara PTA [facility]   5/16/2022  9:30 AM [address] [facility]   5/18/2022  9:30 AM [address] [facility]   5/23/2022  9:30 AM [address] [facility]   5/25/2022  9:30 AM [address] [facility]   6/1/2022  9:30 AM [address] [facility]   6/6/2022  9:30 AM [address] [facility]   6/8/2022  9:30 AM [address] [facility]   6/13/2022  9:30 AM [address] [facility]   6/15/2022  9:30 AM [address] [facility]

## 2022-05-09 ENCOUNTER — HOSPITAL ENCOUNTER (OUTPATIENT)
Dept: PHYSICAL THERAPY | Age: 42
Discharge: HOME | End: 2022-05-09
Payer: MEDICAID

## 2022-05-09 PROCEDURE — 92507 TX SP LANG VOICE COMM INDIV: CPT

## 2022-05-09 NOTE — PROGRESS NOTES
ST DAILY TREATMENT NOTE    Patient Name: Simon Colon  Date:2022  : 1980  [x]  Patient  Verified  Payor: Payor: Dc 22 / Plan: [address] / Product Type: Managed Care Medicaid /   In time: 9:45 Out time: 10:30  Total Treatment Time (min): 45 minutes  Visit #: 3 of 8  *PN due 22*    Treatment Diagnosis: Unspecified symptoms and signs involving cognitive functions and awareness [R41.9]    SUBJECTIVE  Pain Level (0-10 scale): 0  Any medication changes, allergies to medications, adverse drug reactions, diagnosis change, or new procedure performed?: [x] No    [] Yes (see summary sheet for update)    Subjective functional status/changes:   [] No changes reported  Pt arrived  X 15 mins late d/t transportation this date. Pt reports previously on Klonapin, but is not taking it currently \"everything with COVID is different\". However, per chart review he is prescribed Lexapro. He reports he has a psych appointment coming up (1 pm tomorrow). SLP had pt make note in phone that he would like to discuss attention and anxiety with his psych MD. Alarm set for reminder 5 min prior to appointment. Additionally, he reports he will be obtaining a new PCP and has     OBJECTIVE  Treatment provided includes:  Increase/Improve:  []  Voice Quality [x]  Cognitive Linguistic Skills []  Laryngeal/Pharyngeal Exercises   []  Vocal Loudness []  Reading Comprehension []  Swallowing Skills    []  Vocal Cord Function []  Auditory Comprehension []  Oral Motor Skills   []  Resonance []  Writing Skills [x]  Compensatory strategies    []  Speech Intelligibility []  Expressive Language [x]  Attention   []  Breath Support/Coord.  []  Receptive language [x]  Memory   []  Articulation []  Safety Awareness [x] Pt Education    []  Fluency []  Word Retrieval []        Treatment Provided:  -Pt Education  -executive function: calender/ weekly planner   -Delayed Recall  -immediate recall    Patient/Caregiver Education: [x] Review HEP      HEP/Handouts given:  Functional sentence recall     Pain Level (0-10 scale) post treatment: 0    ASSESSMENT   []   Improving appropriately and progressing toward goals  [x]   Improving slowly and progressing toward goals  []   Approximating goals/maximum potential  [x]   Continues to benefit from skilled therapy to address remaining functional deficits  []   Not progressing toward goals and plan of care will be adjusted    Patient will continue to benefit from skilled therapy to address remaining functional deficits: cognitive-linguistic     Progress towards goals / Updated goals:  1. Pt will recall x4/4 memory strategies and apply them to delayed recall tasks (5 words, picture retention, simple novel information) presented visually or verbally given a x5-7 minute delay with 80% acc given Felipe to improve short term memory skills for safety, QOL, ADLs, and dignity. Current 5/9/22: Pt able to recall x4 comp strategies using \"WRAP\". Recalled x 3 words with 4 min delay 100% acc given Felipe for set up of comp strategy      2. Pt will immediately recall various functional sentences, voicemails, and therapeutic activities using compensatory strategies with 80% acc given modA to improve immediate memory skills for safety, QOL, ADLs and dignity. Current 5/9/22: Repeat 6-9 word sentences: 100% acc CARY; 2 sentence story completion: 80% acc given CARY; 4 sentence recall: y/n: 60% acc given x1 rep/Felipe, Open ended given x2 reps: 40% acc, and Forced choice given x2 reps/modA: 66% acc and 100% acc given mod-maxA     3. Pt will complete mental flexibility/alternating attention activities within 3 minutes with 90% acc given Felipe to improve attention for activites of choice, ADLs, and safety. Current 5/9/22: Not targeted this date    4.  Pt will utilize a preferred type of planner (physical or digital) to aid in development of a routine and schedule to reduce frustration and to increase predictability and structure throughout his days with magaly. Current 5/9/22:  Pt now reports he would rather use his phone for planning vs a written format. SLP provided mod skilled instruction cues to make note of things he'd like to d/w his psych MD tomorrow. Appointment was set in calendar already. Pt instructed to put in attention and anxiety questions with an alarm to alert 5 min prior to session. Pt further instructed to note comments from his appointment within his phone after appointment in order to promote carryover.        5. Pt will complete various arthmitic/logical problem solving tasks with 85% acc given Felipe.   Current 5/9/22:Not targeted this date    PLAN  [x]  Continue plan of care  []  Modify Goals/Treatment Plan      []  Discharge due to:  [] Other      KAYLA Louise 5/9/2022  9:05 AM  MA, CCC-SLP  Speech-Language Pathologist    Future Appointments   Date Time Provider Myrna Taveras   5/10/2022  9:15 AM Jessenia Liao [facility] [address]   5/11/2022  9:30 AM [address]   5/12/2022 10:00 AM Chris Silva PTA [facility] [address]   5/13/2022  9:45 AM Chris Silva [facility] [address]   5/16/2022  9:30 AM [facility] [address]   5/18/2022  9:30 AM [address]   5/23/2022  9:30 AM [address]   5/25/2022  9:30 AM [address]   6/1/2022  9:30 AM [address]   6/6/2022  9:30 AM [address]   6/8/2022  9:30 AM [address]   6/13/2022  9:30 AM [address]   6/15/2022  9:30 AM [address]

## 2022-05-10 ENCOUNTER — APPOINTMENT (OUTPATIENT)
Dept: PHYSICAL THERAPY | Age: 42
End: 2022-05-10
Payer: MEDICAID

## 2022-05-11 ENCOUNTER — HOSPITAL ENCOUNTER (OUTPATIENT)
Dept: PHYSICAL THERAPY | Age: 42
Discharge: HOME | End: 2022-05-11
Payer: MEDICAID

## 2022-05-11 PROCEDURE — 92507 TX SP LANG VOICE COMM INDIV: CPT

## 2022-05-11 NOTE — PROGRESS NOTES
ST DAILY TREATMENT NOTE    Patient Name: Filiberto Zuniga  Date:2022  : 1980  [x]  Patient  Verified  Payor: Payor: Dc 22 / Plan: [address] / Product Type: Managed Care Medicaid /   In time: 9:32  Out time:1015  Total Treatment Time (min): 43  Visit #: 4 of 8     Treatment Diagnosis: Unspecified symptoms and signs involving cognitive functions and awareness [R41.9]    SUBJECTIVE  Pain Level (0-10 scale): 0  Any medication changes, allergies to medications, adverse drug reactions, diagnosis change, or new procedure performed?: [] No    [] Yes (see summary sheet for update)    Subjective functional status/changes:   [] No changes reported  Pt reported he had a good session with MD (psych) the other day    OBJECTIVE  Treatment provided includes:  Increase/Improve:  []  Voice Quality [x]  Cognitive Linguistic Skills []  Laryngeal/Pharyngeal Exercises   []  Vocal Loudness []  Reading Comprehension []  Swallowing Skills    []  Vocal Cord Function []  Auditory Comprehension []  Oral Motor Skills   []  Resonance []  Writing Skills [x]  Compensatory strategies    []  Speech Intelligibility []  Expressive Language [x]  Attention   []  Breath Support/Coord.  []  Receptive language [x]  Memory   []  Articulation []  Safety Awareness [x] pt education   []  Fluency []  Word Retrieval []        Treatment Provided:  -pt educ  -executive functioning  -STM  -mental flexibility    Patient/Caregiver  Education: [x] Review HEP      HEP/Handouts given: sentence recall    Pain Level (0-10 scale) post treatment: 0    ASSESSMENT   []   Improving appropriately and progressing toward goals  [x]   Improving slowly and progressing toward goals  []   Approximating goals/maximum potential  [x]   Continues to benefit from skilled therapy to address remaining functional deficits  []   Not progressing toward goals and plan of care will be adjusted    Patient will continue to benefit from skilled therapy to address remaining functional deficits: cognitive-linguistics   Progress towards goals / Updated goals:  1. Pt will recall x4/4 memory strategies and apply them to delayed recall tasks [facility] retention, simple novel information) presented visually or verbally given a x5-7 minute delay with 80% acc given Felipe to improve short term memory skills for safety, QOL, ADLs, and dignity. Current 22: Pt able to recall x4 comp strategies using \"WRAP\". Recalled x 4 unrelated words: pt chose to write it down  with 7 min delay: 75% acc given modA for set up of comp strategy     2. Pt will immediately recall various functional sentences, voicemails, and therapeutic activities using compensatory strategies with 80% acc given modA to improve immediate memory skills for safety, QOL, ADLs and dignity. Current 22: Not targeted this date       3. Pt will complete mental flexibility/alternating attention activities within 3 minutes with 90% acc given Felipe to improve attention for activites of choice, ADLs, and [id number]. Current 22: (verbally) 3 words sequencin% acc CARY and increasing to 100% acc given Felipe; increased to 4 words: 43% acc CARY and increasing to 100% acc given mod cues/reps        4. Pt will utilize a preferred type of planner (physical or digital) to aid in development of a routine and schedule to reduce frustration and to increase predictability and structure throughout his days with magaly. Current 22:  Pt reports his alarm went off to d/w psych about the topic he wanted to speak about, however, he was overwhelmed with everything being discussed he forgot to inquire about his concern for attention deficit. He did not bring his folder or printed calendar to tx this date. SLP had pt set an alarm on his phone 30 min prior to his next tx session to bring those to items next session and for  to complete HEP in time for next session given modA.      5.  Pt will complete various arthmitic/logical problem solving tasks with 85% acc given Felipe.   Current 5/11/22:Not targeted this date       PLAN  [x]  Continue plan of care  []  Modify Goals/Treatment Plan      []  Discharge due to:  [] Other:    Torrey Kyle, SLP 5/11/2022  9:37 AM  MA, CCC-SLP  Speech-Language Pathologist    Future Appointments   Date Time Provider Myrna Taveras   5/12/2022 10:00 AM Yvone Leaver, [facility]   5/13/2022  9:45 AM Yvone Leaver, [facility]   5/16/2022  9:30 AM [address] [facility]   5/18/2022  9:30 AM [address] [facility]   5/23/2022  9:30 AM [address] [facility]   5/25/2022  9:30 AM [address] [facility]   6/1/2022  9:30 AM [address] [facility]   6/6/2022  9:30 AM [address] [facility]   6/8/2022  9:30 AM [address] [facility]   6/13/2022  9:30 AM [address] [facility]   6/15/2022  9:30 AM [address] [facility]

## 2022-05-12 ENCOUNTER — HOSPITAL ENCOUNTER (OUTPATIENT)
Dept: PHYSICAL THERAPY | Age: 42
Discharge: HOME | End: 2022-05-12
Payer: MEDICAID

## 2022-05-12 PROCEDURE — 97112 NEUROMUSCULAR REEDUCATION: CPT

## 2022-05-12 PROCEDURE — 97530 THERAPEUTIC ACTIVITIES: CPT

## 2022-05-12 PROCEDURE — 97110 THERAPEUTIC EXERCISES: CPT

## 2022-05-12 PROCEDURE — 97535 SELF CARE MNGMENT TRAINING: CPT

## 2022-05-13 ENCOUNTER — HOSPITAL ENCOUNTER (OUTPATIENT)
Dept: PHYSICAL THERAPY | Age: 42
Discharge: HOME | End: 2022-05-13
Payer: MEDICAID

## 2022-05-13 PROCEDURE — 97530 THERAPEUTIC ACTIVITIES: CPT

## 2022-05-13 PROCEDURE — 97112 NEUROMUSCULAR REEDUCATION: CPT

## 2022-05-13 PROCEDURE — 97110 THERAPEUTIC EXERCISES: CPT

## 2022-05-13 PROCEDURE — 97535 SELF CARE MNGMENT TRAINING: CPT

## 2022-05-13 NOTE — PROGRESS NOTES
PHYSICAL THERAPY - DAILY TREATMENT NOTE     Patient Name: Juice Aguilar        Date: 2022  : 1980   YES Patient  Verified  Visit #:     Insurance: Payor: Dc 22 / Plan: [address] / Product Type: Managed Care Medicaid /      In time: 9:45 am Out time: 10:39 am   Total Treatment Time: 54     Medicare/BCBS Time Tracking (below)   Total Timed Codes (min):  - 1:1 Treatment Time: -     TREATMENT AREA =  Other abnormalities of gait and mobility [R26.89]  Other symptoms and signs involving the musculoskeletal system [R29.898]    SUBJECTIVE    Pain Level (on 0 to 10 scale):  0  / 10   Medication Changes/New allergies or changes in medical history, any new surgeries or procedures?     NO    If yes, update Summary List   Subjective Functional Status/Changes:  []  No changes reported       Functional improvements: use of no [facility]         OBJECTIVE  Modalities Rationale:  N/a    23 min Therapeutic Exercise:  [x]  See flow sheet   Rationale:      increase ROM and increase strength to improve the patients ability to perform self care     9 min Therapeutic Activity: [x]  See flow sheet   Rationale:      increase ROM and increase strength to improve the patients ability to perform self care    12 min Neuromuscular Re-ed: [x]  See flow sheet   Rationale:      increase ROM, increase strength, improve coordination, improve balance and increase proprioception to improve the patients ability to perform functional ADLs          10  Min Billed With/As:   [] TE   [] TA   [] Neuro   [] Self Care Patient Education: [x] Review HEP , discussed discharge planning   [] Progressed/Changed HEP based on:   [] positioning   [] body mechanics   [] transfers   [] heat/ice application    [] other:        Other Objective/Functional Measures:  Review gait with use of SPC vs SBQC for community ambulation  Review use of safety and set with TM walking   Post Treatment Pain Level (on 0 to 10) scale:   0  / 10     ASSESSMENT    Assessment/Changes in Function:   Pt demonstrating good stability in use of SPC for ambulation. Discussed discharge planning. Emphasized use of consistent HEP as tolerated     []  See Progress Note/Recertification   Patient will continue to benefit from skilled PT services to modify and progress therapeutic interventions, address functional mobility deficits, address ROM deficits, address strength deficits, analyze and address soft tissue restrictions, analyze and cue movement patterns, analyze and modify body mechanics/ergonomics, assess and modify postural abnormalities, address imbalance/dizziness and instruct in home and community integration to attain remaining goals.       Progress toward goals / Updated goals:    Good Progress to    [] STG    [] LTG  1 as shown by emphasized consistency in HEP       PLAN    [x]  Upgrade activities as tolerated YES Continue plan of care   []  Discharge due to :    []  Other:      Therapist: Loren Barger PTA    Date: 5/13/2022 Time: 10:39  AM     Future Appointments   Date Time Provider Myrna Taveras   5/16/2022  9:30 AM [address] [facility]   5/18/2022  9:30 AM [address] [facility]   5/23/2022  9:30 AM [address] [facility]   5/25/2022  9:30 AM [address] [facility]   6/1/2022  9:30 AM [address] [facility]   6/6/2022  9:30 AM [address] [facility]   6/8/2022  9:30 AM [address] [facility]   6/13/2022  9:30 AM [address] [facility]   6/15/2022  9:30 AM [address] [facility]

## 2022-05-16 ENCOUNTER — HOSPITAL ENCOUNTER (OUTPATIENT)
Dept: PHYSICAL THERAPY | Age: 42
Discharge: HOME | End: 2022-05-16
Payer: MEDICAID

## 2022-05-16 PROCEDURE — 92507 TX SP LANG VOICE COMM INDIV: CPT

## 2022-05-16 NOTE — PROGRESS NOTES
ST DAILY TREATMENT NOTE    Patient Name: Andre Israel  Date:2022  : 1980   [x]  Patient  Verified  Payor: Payor: Dc 22 / Plan: [address] / Product Type: Managed Care Medicaid /   In time: 9:32 Out time:1015  Total Treatment Time (min): 43  Visit #: 5 of 8   *PN due 22*    Treatment Diagnosis: Unspecified symptoms and signs involving cognitive functions and awareness [R41.9]    SUBJECTIVE  Pain Level (0-10 scale): 0  Any medication changes, allergies to medications, adverse drug reactions, diagnosis change, or new procedure performed?: [] No    [] Yes (see summary sheet for update)    Subjective functional status/changes:   [] No changes reported  Pt presents today with physical planner. Pt did not complete HEP again stating he thought he completed his homework in therapy. Pt advised he will always have a homework assignment to complete at home; re issued and put in his planner. OBJECTIVE  Treatment provided includes:  Increase/Improve:  []  Voice Quality [x]  Cognitive Linguistic Skills []  Laryngeal/Pharyngeal Exercises   []  Vocal Loudness []  Reading Comprehension []  Swallowing Skills    []  Vocal Cord Function []  Auditory Comprehension []  Oral Motor Skills   []  Resonance []  Writing Skills [x]  Compensatory strategies    []  Speech Intelligibility []  Expressive Language [x]  Attention   []  Breath Support/Coord.  []  Receptive language [x]  Memory   []  Articulation []  Safety Awareness [x] pt education   []  Fluency []  Word Retrieval []        Treatment Provided:  -pt educ  -executive functioning  -STM  -immediate recall     Patient/Caregiver  Education: [x] Review HEP      HEP/Handouts given: sentence recall    Pain Level (0-10 scale) post treatment: 0    ASSESSMENT   []   Improving appropriately and progressing toward goals  [x]   Improving slowly and progressing toward goals  []   Approximating goals/maximum potential  [x]   Continues to benefit from skilled therapy to address remaining functional deficits  []   Not progressing toward goals and plan of care will be adjusted    Patient will continue to benefit from skilled therapy to address remaining functional deficits: cognitive-linguistics   Progress towards goals / Updated goals:  1. Pt will recall x4/4 memory strategies and apply them to delayed recall tasks [facility] retention, simple novel information) presented visually or verbally given a x5-7 minute delay with 80% acc given Felipe to improve short term memory skills for safety, QOL, ADLs, and dignity. Current 5/16/22: Pt able to recall x4 comp strategies using \"WRAP\". Recalled x 3 unrelated words using repetition/picture it: pt chose to write it down  with 5 min delay: 66% acc given modA for set up of comp strategy; repeated again with use of repetition: given a 5 min delay recalled with 33% acc given Felipe  Address next session      2. Pt will immediately recall various functional sentences, voicemails, and therapeutic activities using compensatory strategies with 80% acc given modA to improve immediate memory skills for safety, QOL, ADLs and dignity. Current 5/16/22: 36% acc CARY and increasing to 91% acc given modA/ x2 reps    3. Pt will complete mental flexibility/alternating attention activities within 3 minutes with 90% acc given Felipe to improve attention for activites of choice, ADLs, and safety. Current 5/16/22: Unscrambling sequences of ADL tasks (visually presented):     4. Pt will utilize a preferred type of planner (physical or digital) to aid in development of a routine and schedule to reduce frustration and to increase predictability and structure throughout his days with magaly. Current 5/16/22:  Pt presents this date with a planner. SLP provided set up for easy finding month and weekly section. Pt educated re: placing appointments in planner; completed with upcoming ST appts.  Additionally, pt advised to write homework assignment on today's day in week view. HEP place within planner.      5. Pt will complete various arthmitic/logical problem solving tasks with 85% acc given Felipe.   Current 5/16/22:  Address next session        PLAN  [x]  Continue plan of care  []  Modify Goals/Treatment Plan      []  Discharge due to:  [] Other:    Paul Villanueva, SLP 5/16/2022  9:37 AM  MA, CCC-SLP  Speech-Language Pathologist    Future Appointments   Date Time Provider Myrna Taveras   5/16/2022  9:30 AM [address] [facility]   5/18/2022  9:30 AM [address] [facility]   5/23/2022  9:30 AM [address] [facility]   5/25/2022  9:30 AM [address] [facility]   6/1/2022  9:30 AM [address] [facility]   6/6/2022  9:30 AM [address] [facility]   6/8/2022  9:30 AM [address] [facility]   6/13/2022  9:30 AM [address] [facility]   6/15/2022  9:30 AM [address] [facility]

## 2022-05-18 ENCOUNTER — APPOINTMENT (OUTPATIENT)
Dept: PHYSICAL THERAPY | Age: 42
End: 2022-05-18
Payer: MEDICAID

## 2022-05-25 ENCOUNTER — APPOINTMENT (OUTPATIENT)
Dept: PHYSICAL THERAPY | Age: 42
End: 2022-05-25
Payer: MEDICAID

## 2022-06-01 ENCOUNTER — APPOINTMENT (OUTPATIENT)
Dept: PHYSICAL THERAPY | Age: 42
End: 2022-06-01
Payer: MEDICAID

## 2022-06-05 ENCOUNTER — HOSPITAL ENCOUNTER (OUTPATIENT)
Age: 42
Discharge: HOME | End: 2022-06-05
Attending: NURSE PRACTITIONER
Payer: MEDICAID

## 2022-06-05 PROCEDURE — 70551 MRI BRAIN STEM W/O DYE: CPT

## 2022-06-06 ENCOUNTER — APPOINTMENT (OUTPATIENT)
Dept: PHYSICAL THERAPY | Age: 42
End: 2022-06-06
Payer: MEDICAID

## 2022-06-07 NOTE — PROGRESS NOTES
[address] [facility] PHYSICAL THERAPY AT [facility] [address] [facility], [address]  Phone: [phone]  Fax: [phone]  PROGRESS NOTE  Patient Name: Bella Oconnell : 1980   Treatment/Medical Diagnosis: Other abnormalities of gait and mobility [R26.89]  Other symptoms and signs involving the musculoskeletal system [R29.898]   Referral Source: Valentino Plank, MD     Date of Initial Visit: 2022 Attended Visits: 22 Missed Visits: -     SUMMARY OF TREATMENT  PT consisted of manual therapy techniques, therapeutic exercises, and modalities to improve strength, improve mobility, decrease pain, and improve overall function. CURRENT STATUS  Pt showing good overall progress with PT. Pt still ambulating long distances with SPC and B AFOs. Pt now wlaking short distances with no AD and no AFOs. Pt still having significant foot drop in L LE while RLE has shown good improvements in DF strength. Pt showing improved LE functional strength. Pt still demonstrating decreased static and dynamic balance. Pt has not been seen for a few weeks due to scheduling issues, unable to formally assess goals. Pt at last visit was still at increased risk of falling. Pt would benefit from continued strength and balance training to improve overall safety with ADLs      Goal/Measure of Progress Goal Met? 1. Increase score on FOTO to > or = to 60 points to demo an increase in functional activity tolerance with the LE. Status at last Eval: 45 Current Status: 46 no   2. Pt will demonstrate a GROC score of >/= +5 to show overall improvement in function   Status at last Eval: +4 Current Status: +4 no   3. Pt to ambulate with AFOs with no AD for > 250ft to show improved independence with ambulation   Status at last Eval: Walking with AD Current Status: Walking with AD no         New Goals to be achieved in __4__  weeks:  1.   Continue goals above   2.  -   3.  -   4.  - RECOMMENDATIONS  Pt to continue PT 2x a week for 4-6 weeks to improve overall strength and balance to improve safety with ADLs  If you have any questions/comments please contact us directly at [phone]. Thank you for allowing us to assist in the care of your patient. Therapist Signature: Jay Hadley Date: 6/7/2022     Time: 8:55 AM   NOTE TO PHYSICIAN:  PLEASE COMPLETE THE ORDERS BELOW AND FAX TO   [facility] at [address]: [phone]. If you are unable to process this request in 24 hours please contact our office: [phone].    ___ I have read the above report and request that my patient continue as recommended.   ___ I have read the above report and request that my patient continue therapy with the following changes/special instructions:_________________________________________________________   ___ I have read the above report and request that my patient be discharged from therapy.      Physician Signature:        Date:       Time:        Leif Wolf MD

## 2022-06-08 ENCOUNTER — HOSPITAL ENCOUNTER (OUTPATIENT)
Dept: PHYSICAL THERAPY | Age: 42
End: 2022-06-08
Payer: MEDICAID

## 2022-06-08 ENCOUNTER — APPOINTMENT (OUTPATIENT)
Dept: PHYSICAL THERAPY | Age: 42
End: 2022-06-08
Payer: MEDICAID

## 2022-06-10 ENCOUNTER — TELEPHONE (OUTPATIENT)
Dept: PHYSICAL THERAPY | Age: 42
End: 2022-06-10

## 2022-06-13 ENCOUNTER — APPOINTMENT (OUTPATIENT)
Dept: PHYSICAL THERAPY | Age: 42
End: 2022-06-13
Payer: MEDICAID

## 2022-06-15 ENCOUNTER — APPOINTMENT (OUTPATIENT)
Dept: PHYSICAL THERAPY | Age: 42
End: 2022-06-15
Payer: MEDICAID

## 2022-06-17 ENCOUNTER — HOSPITAL ENCOUNTER (OUTPATIENT)
Dept: PHYSICAL THERAPY | Age: 42
Discharge: HOME | End: 2022-06-17
Payer: MEDICAID

## 2022-06-17 PROCEDURE — 97112 NEUROMUSCULAR REEDUCATION: CPT

## 2022-06-17 PROCEDURE — 97530 THERAPEUTIC ACTIVITIES: CPT

## 2022-06-17 PROCEDURE — 97110 THERAPEUTIC EXERCISES: CPT

## 2022-06-17 NOTE — PROGRESS NOTES
PHYSICAL THERAPY - DAILY TREATMENT NOTE    Patient Name: Radha Lockett        Date: 2022  : 1980    Patient  Verified: YES  Visit #:     Insurance: Payor: Edmundo Russell / Plan: [address] [place] [address] / Product Type: Managed Care Medicaid /      In time: 10:00 Out time: 10:40   Total Treatment Time: 40     Medicare Time Tracking (below)   Total Timed Codes (min):  - 1:1 Treatment Time:  -     TREATMENT AREA/ DIAGNOSIS = Other abnormalities of gait and mobility [R26.89]  Other symptoms and signs involving the musculoskeletal system [R29.898]    SUBJECTIVE   Pain Level (on 0 to 10 scale):  0  / 10   Medication Changes/New allergies or changes in medical history, any new surgeries or procedures?     NO    If yes, update Summary List   Subjective Functional Status/Changes:  []  No changes reported     See PN      OBJECTIVE      15 min Therapeutic Exercise:  [x]  See flow sheet   Rationale:      increase ROM and increase strength to improve the patients ability to perform ADLs without pain       15 min Therapeutic Activity: [x]  See flow sheet   Rationale:    functional activities to improve the patients ability to perform ADLs without pain    10 min Neuromuscular Re-ed: [x]  See flow sheet   Rationale:    improve coordination, improve balance and increase proprioception to improve the patients ability to perform ADLs without pain    Billed With/As:   [x] TE   [] TA   [] Neuro   [] Self Care Patient Education: [x] Review HEP    [] Progressed/Changed HEP based on:   [] positioning   [] body mechanics   [] transfers   [] heat/ice application    [] other:        Other Objective/Functional Measures:    See PN   Post Treatment Pain Level (on 0 to 10) scale:   0  / 10     ASSESSMENT  Assessment/Changes in Function:     See PN     []  See Progress Note/Recertification   Patient will continue to benefit from skilled PT services to modify and progress therapeutic interventions, address functional mobility deficits, address ROM deficits, address strength deficits, analyze and address soft tissue restrictions and analyze and cue movement patterns to attain remaining goals.    Progress toward goals / Updated goals:    See PN     PLAN  [x]  Upgrade activities as tolerated YES Continue plan of care   []  Discharge due to :    []  Other:      Therapist: Humera Tang DPT     Date: 6/17/2022 Time: 9:49 AM        Future Appointments   Date Time Provider Myrna Darcy   6/17/2022 10:00 AM Kathaleen Sessions [facility]   6/20/2022  9:30 AM [address] [facility]   6/22/2022  9:30 AM [address] [facility]   6/23/2022 10:45 AM Samreen Rueda PTA [facility]   6/27/2022  9:30 AM [address] [facility]   6/28/2022 10:00 AM Kathaleen Sessions [facility]   6/29/2022  9:30 AM [address] [facility]   6/30/2022 10:45 AM Samreen Rueda PTA [facility]

## 2022-06-17 NOTE — PROGRESS NOTES
[address] [place] [address] PHYSICAL THERAPY AT [facility] [address]. [place], [address]  Phone: [phone]  Fax: [phone]  PROGRESS NOTE  Patient Name: Jeanna Ochoa : 1980   Treatment/Medical Diagnosis: Other abnormalities of gait and mobility [R26.89]  Other symptoms and signs involving the musculoskeletal system [R29.898]   Referral Source: Davy Rodríguez MD     Date of Initial Visit: 2022 Attended Visits: 23 Missed Visits: -     SUMMARY OF TREATMENT  PT consisted of manual therapy techniques, therapeutic exercises, and modalities to improve strength, improve mobility, decrease pain, and improve overall function. CURRENT STATUS  Pt was last seen on 2022 due to insurance authorization issues. Pt showing MMT at 5-/5 grossly on B LEs except for DF. L DF MMT: 3-/5, R DF MMT: 3+/5. Pt demonstrating overall decreased static balance. Pt unable to SLS > 5 secs on either LE. Pt still ambulating longer distances with SPC and B AFOs    Goal/Measure of Progress Goal Met? 1.  Increase score on FOTO to > or = to 60 points to demo an increase in functional activity tolerance with the LE. Status at last Eval: 46 Current Status: 40 no   2.  Pt will demonstrate a GROC score of >/= +5 to show overall improvement in function   Status at last Eval: +4 Current Status: +6 no   3.  Pt to ambulate with AFOs with no AD for > 250ft to show improved independence with ambulation   Status at last Eval: Walking with AD Current Status: Walking with AD no         New Goals to be achieved in __4__  weeks:  1. Continue goals above   2.  -   3.  -   4.  -       RECOMMENDATIONS  Pt to continue PT 2x a week for 4 weeks to improve overall strength and balance needed for ADLs  If you have any questions/comments please contact us directly at [phone]. Thank you for allowing us to assist in the care of your patient.     Therapist Signature: Marcus Sahu Date: 2022 [FreeTextEntry1] : Sina is a 80-year-old male with medical history detailed above and active medical issues including:\par \par - No anginal symptoms, multiple CAD risk factors, nonobstructive catheterization 2008, normal perfusion Myoview stress test 2017.  Patient declines performing a current stress test.\par \par - Bileaflet mitral valve prolapse, mild MR, moderate pulmonary hypertension, normal LVEF echo July 2018.   \par \par - Hypertension, average resting home BPs at guideline goal on diltiazem, losartan HCTZ.  \par \par - Dyslipidemia on simvastatin well tolerated\par \par - LBBB since 2005. \par \par - Pulmonary embolus 2016 completed Xarelto therapy\par \par - Family history of premature vascular disease\par \par Advised patient to follow active lifestyle with regular cardiovascular exercise. Patient educated on lifestyle and diet modification with low sodium low fat diet and avoidance of excessive alcohol. Patient is aware to call with any symptoms or concerns. \par \par Patient will reduce carbohydrate intake including bread, pasta, potatoes, starch and increase protein intake in an effort to loose weight.  Carotid and abdominal ultrasound to assess for obstructive PAD with TEB followup. Cardiology follow-up 6 months.  Current cardiac medications remain unchanged. Repeat labs will be ordered with PMD.\par \par Sina will followup with Dr Cindy Fong for primary care. \par \par  Time: 9:53 AM   NOTE TO PHYSICIAN:  PLEASE COMPLETE THE ORDERS BELOW AND FAX TO   [facility] at [address]: [phone]. If you are unable to process this request in 24 hours please contact our office: [phone].    ___ I have read the above report and request that my patient continue as recommended.   ___ I have read the above report and request that my patient continue therapy with the following changes/special instructions:_________________________________________________________   ___ I have read the above report and request that my patient be discharged from therapy.      Physician Signature:        Date:       Time:        Nurys Sellers MD

## 2022-06-20 ENCOUNTER — HOSPITAL ENCOUNTER (OUTPATIENT)
Dept: PHYSICAL THERAPY | Age: 42
Discharge: HOME | End: 2022-06-20
Payer: MEDICAID

## 2022-06-20 PROCEDURE — 92507 TX SP LANG VOICE COMM INDIV: CPT

## 2022-06-20 NOTE — PROGRESS NOTES
ST DAILY TREATMENT NOTE    Patient Name: Jack Fountain  Date:2022  : 1980  [x]  Patient  Verified  Payor: Payor: [facility] MEDICAID / Plan: [facility] / Product Type: Managed Care Medicaid /   In time: 9:30 Out time:10:15  Total Treatment Time (min): 45  Visit #: 1 of 8  * PN 22*  (auth expires 22)    Treatment Diagnosis: Unspecified symptoms and signs involving cognitive functions and awareness [R41.9]    SUBJECTIVE  Pain Level (0-10 scale): 0  Any medication changes, allergies to medications, adverse drug reactions, diagnosis change, or new procedure performed?: [] No    [x] Yes (see summary sheet for update)   + Adderall (trialing); D/c zyprexa and Nurotin    Subjective functional status/changes:   [] No changes reported  \"I've been seeing a counselor once a week. \"    OBJECTIVE  Treatment provided includes:  Increase/Improve:  []  Voice Quality [x]  Cognitive Linguistic Skills []  Laryngeal/Pharyngeal Exercises   []  Vocal Loudness []  Reading Comprehension []  Swallowing Skills    []  Vocal Cord Function []  Auditory Comprehension []  Oral Motor Skills   []  Resonance []  Writing Skills [x]  Compensatory strategies    []  Speech Intelligibility []  Expressive Language [x]  Attention   []  Breath Support/Coord. []  Receptive language [x]  Memory   []  Articulation []  Safety Awareness [x] pt educ   []  Fluency []  Word Retrieval []        Treatment Provided: The Brief Cognitive Assessment Tool (BCAT) was administered. The instrument assesses orientation, verbal recall, visual recognition, visual recall, attention, abstraction, language, executive functions, visuospacial processing in adults and older adult population. Results of the sub-tests: scored as correct/total possible.    Orientation: Patient was oriented to person, month, day of week, city, state, and situation: 5/6  Immediate verbal memory: 4/4 words; delayed verbal memory: 0/4words  (+2 with mod cues)  Visual recognition/naming 3 common objects: 3/3; delayed visual recall 2/3. (+1 with min cues)  Attention: tap when he heard a certain letter called out:   Mental control: count backwards 20-1:  Days of week backwards:   digits forward ; digits backwards:   Abstraction: tell the similarities: 3/3   Language: repeat a sentence   Fluency: list girls names:   Executive: cognitive shiftin  Arithmetic reasonin/1  Judgement: (how much time to a lot to get to an appointment) :   Visuospatial; design:  Clock   Immediate story recall:  facts recalled:  Delayed story recall:   Story recognition: 3/5    Patient attained a score of  35 points out of a total of 50, indicating moderate cognitive deficits. Following re-assessment, complete problem solving (mathmetical/money management): 60% acc CARY and increasing to 80% acc given min-modA    Patient/Caregiver  Education: [x] Review HEP      HEP/Handouts given: problem solving    Pain Level (0-10 scale) post treatment: 0    ASSESSMENT     []   Improving appropriately and progressing toward goals  [x]   Improving slowly and progressing toward goals  []   Approximating goals/maximum potential  [x]   Continues to benefit from skilled therapy to address remaining functional deficits  []   Not progressing toward goals and plan of care will be adjusted    Patient will continue to benefit from skilled therapy to address remaining functional deficits: cognitive-linguistics    Progress towards goals / Updated goals:  See MD Updated note.     PLAN  []  Continue plan of care  [x]  Modify Goals/Treatment Plan      []  Discharge due to:  [] Other:    KAYLA Maurer 2022  9:35 AM  MA, CCC-SLP  Speech-Language Pathologist    Future Appointments   Date Time Provider Myrna Taveras   2022  9:30 AM [address] [facility]   2022 10:45 AM Loida Tobar [facility]   2022  9:30 AM [address] [facility] [address] SPEECH [address] [facility]   6/28/2022 10:00 AM Jessenia Liao [facility]   6/29/2022  9:30 AM Ramonita Copeland [facility]   6/30/2022 10:45 AM Chris Silva PTA [facility]

## 2022-06-21 ENCOUNTER — APPOINTMENT (OUTPATIENT)
Dept: PHYSICAL THERAPY | Age: 42
End: 2022-06-21
Payer: MEDICAID

## 2022-06-22 ENCOUNTER — APPOINTMENT (OUTPATIENT)
Dept: PHYSICAL THERAPY | Age: 42
End: 2022-06-22
Payer: MEDICAID

## 2022-06-23 ENCOUNTER — APPOINTMENT (OUTPATIENT)
Dept: PHYSICAL THERAPY | Age: 42
End: 2022-06-23
Payer: MEDICAID

## 2022-06-27 ENCOUNTER — HOSPITAL ENCOUNTER (OUTPATIENT)
Dept: PHYSICAL THERAPY | Age: 42
Discharge: HOME | End: 2022-06-27
Payer: MEDICAID

## 2022-06-27 PROCEDURE — 92507 TX SP LANG VOICE COMM INDIV: CPT

## 2022-06-27 NOTE — PROGRESS NOTES
ST DAILY TREATMENT NOTE    Patient Name: Noreen Gottron  Date:2022  : 1980  [x]  Patient  Verified  Payor: Payor: [facility] MEDICAID / Plan: [address] / Product Type: Managed Care Medicaid /   In time: 9:30 Out time:10:15  Total Treatment Time (min): 45  Visit #: 2 of 8  * PN 22*  (auth expires 22)    Treatment Diagnosis: Unspecified symptoms and signs involving cognitive functions and awareness [R41.9]    SUBJECTIVE  Pain Level (0-10 scale): 0  Any medication changes, allergies to medications, adverse drug reactions, diagnosis change, or new procedure performed?: [] No    [x] Yes (see summary sheet for update)  Oxcarbazepine 300mg; Escitaloprum 20 mg; Adderall 5mg; Propanol 10 mg    Subjective functional status/changes:   [] No changes reported  Pt presents today with planner and HEP completed    OBJECTIVE  Treatment provided includes:  Increase/Improve:  []  Voice Quality [x]  Cognitive Linguistic Skills []  Laryngeal/Pharyngeal Exercises   []  Vocal Loudness []  Reading Comprehension []  Swallowing Skills    []  Vocal Cord Function []  Auditory Comprehension []  Oral Motor Skills   []  Resonance []  Writing Skills [x]  Compensatory strategies    []  Speech Intelligibility []  Expressive Language [x]  Attention   []  Breath Support/Coord.  []  Receptive language [x]  Memory   []  Articulation []  Safety Awareness [x] pt educ   []  Fluency []  Word Retrieval []        Treatment Provided:  -pt educ  -delayed recall  -immediate recall  -problem solving  -mental flexibility  -executive functioning    Patient/Caregiver  Education: [x] Review HEP      HEP/Handouts given: problem solving    Pain Level (0-10 scale) post treatment: 0    ASSESSMENT     []   Improving appropriately and progressing toward goals  [x]   Improving slowly and progressing toward goals  []   Approximating goals/maximum potential  [x]   Continues to benefit from skilled therapy to address remaining functional deficits  []   Not progressing toward goals and plan of care will be adjusted    Patient will continue to benefit from skilled therapy to address remaining functional deficits: cognitive-linguistics    Progress towards goals / Updated goals:  1. Pt will recall x4/4 memory strategies and apply them to delayed recall tasks [facility] retention, simple novel information) presented visually or verbally given a x5-7 minute delay with 80% acc given Felipe to improve short term memory skills for safety, QOL, ADLs, and dignity. Current 5/16/22: Pt able to recall x4 comp strategies using \"WRAP\". Given min-modA for set up (asociation/repetition): pt recalled x3 words after a 3 min delay: 100% acc given modA     2. Pt will immediately recall various functional sentences, voicemails, and therapeutic activities using compensatory strategies with 80% acc given modA to improve immediate memory skills for safety, QOL, ADLs and dignity. Current 5/16/22:simulated voicemails: 54% acc CARY and increasing to 82% acc given min-modA using repetition and write it down.      3. Pt will complete mental flexibility/alternating attention activities within 3 minutes with 90% acc given Felipe to improve attention for activites of choice, ADLs, and safety. Current 6/27/22: 80% acc CARY      4. Pt will utilize a preferred type of planner (physical or digital) to aid in development of a routine and schedule to reduce frustration and to increase predictability and structure throughout his days with magaly. Current 6/27/22: Felipe to set up physical planner for the week.          5. Pt will complete various arthmitic/logical problem solving tasks with 85% acc given Felipe.   Current 6/27/22: 33% acc CARY and increasing to 100% acc given mod-maxA.      PLAN  [x]  Continue plan of care  []  Modify Goals/Treatment Plan      []  Discharge due to:  [] Other:    Reyes Boer, SLP 6/27/2022  9:35 AM  MA, CCC-SLP  Speech-Language Pathologist    Future Appointments   Date Time Provider Myrna Taveras   6/27/2022  9:30 AM [address] [facility]   6/28/2022 10:00 AM Genia Rattler [facility]   6/29/2022  9:30 AM [address] [facility]   6/30/2022 10:45 AM Vandana Mcgregor, PTA [facility]   7/1/2022  9:00 AM Vandana Mcgregor, PTA [facility]   7/5/2022  9:15 AM Genia Rattler [facility]   7/7/2022  9:15 AM Genia Rattler [facility]   7/8/2022  9:45 AM Vandana Mcgregor, PTA [facility]   7/12/2022  9:15 AM Genia Rattler [facility]   7/14/2022  9:15 AM Genia Rattler [facility]   7/15/2022  9:45 AM Vandanaelia Mcgregor, PTA [facility]   7/19/2022  9:15 AM Vandana Mcgregor, PTA [facility]   7/21/2022  9:15 AM Genia Rattler [facility]

## 2022-06-27 NOTE — PROGRESS NOTES
[facility]  [address] [place] [address], [address] [place] [address] , [address] [place] [address]  Phone: [phone]             Fax: [phone]     Speech Therapy Physician Update  [x] Progress Note  [] Discharge Summary  Patient name: Ward Muñiz Start of Care: 3/23/22   Referral source: Donley Kocher, NP : 1980   Medical/Treatment Diagnosis: Unspecified symptoms and signs involving cognitive functions and awareness [R41.9]  Payor: [facility] MEDICAID / Plan: [employer] / Product Type: Managed Care Medicaid /  Onset Date: ~2021     Prior Hospitalization: see medical history Provider#: [id number]   Medications: Verified on Patient Summary List    Comorbidities: depression, alcohol use, intravenous drug use in remission, tobacco use, chronic hepatitis C, visually impaired, scoliosis    Prior Level of Function: Pt reports all areas of speech/language, hearing, voice, swallowing, were within normal limits  Visits from Start of Care: 10   Missed Visits: 3     Reporting Period: --22  * (Pt not seen from 22-22 d/t being on hold for insurance auth)    Status at Evaluation/Last Progress Note:   1. Pt will recall x3/4 memory strategies and apply them to delayed recall tasks (3 words, 3 digits, picture retention) presented visually or verbally given a 3-5 minute delay with 80% acc given Felipe to improve short term memory skills for safety, QOL, ADLs, and dignity. Previous: Pt recalled ~x3/4 compensatory strategies CARY and ~x4/4 with Felipe. Digits: Given a x5 minute delay, pt recalled 100% with magaly. Words: Given a x3-5 minute delay, pt recalled 100% acc CARY. -progressing/advance goal     2. Pt will immediately recall various functional sentences, voicemails, and therapeutic activities using compensatory strategies with 80% acc given modA to improve immediate memory skills for safety, QOL, ADLs and dignity.    Previous: Simulated Voicemail: 43% acc CARY increasing to 57% acc given maxA/x2 reps. Recalling Info from Sentences: 43% acc CARY increasing to 71% acc given modA.   -progressing/continue addressing     3. Pt will complete mental flexibility/alternating attention activities within 3 minutes with 90% acc given Felipe to improve attention for activites of choice, ADLs, and safety. Previous:   Mental Flexibility/Alternating Attention: Within 3 minutes, pt ~60% acc CARY. Given additional minutes and modA, pt ~90% acc.   -progressing/continue addressing    4. Pt will utilize a preferred type of planner (physical or digital) to aid in development of a routine and schedule to reduce frustration and to increase predictability and structure throughout his days with magaly.    Previous: Pt reports he is currently utilizing a digital planner but wishes it would send an alarm to his phone rather than a notification. Student SLP/pt discussed using a pocket planner + digital planner so the pt is reviewing the information twice while adding it to the planners. Additionally, the efficiency of being able to quickly write something in the planner vs using his phone was discussed. Pt to look into buying a pocket planner/discuss with his mother. Student SLP sent a note home to pt's mom regarding purchasing the planner. -progressing/continue addressing      5. Pt will complete various arthmitic/logical problem solving tasks with 85% acc given Felipe.   Previous: Logical Problem Solving: Deduction Puzzle: 40% acc CARY increasing to 65% acc given mod-max teaching/skilled instruction. Deductive Reasoning: Calendar Dates: 70% acc CARY increasing to 90% acc given mod-maxA/reps of directions. Arithmetic Word Problems: 75% acc CARY increasing to 90% acc given min-modA. Pt benefited from scratch paper/pen to compute equations. -progressing/continue addressing      Progress towards Goals:   1.  Pt will recall x4/4 memory strategies and apply them to delayed recall tasks (5 words, picture retention, simple novel information) presented visually or verbally given a x5-7  minute delay with 80% acc given Felipe to improve short term memory skills for safety, QOL, ADLs, and dignity. Current: Pt able to recall x4 comp strategies using \"WRAP\". Recalled x 3 unrelated words using repetition/picture it: pt chose to write it down  with 5 min delay: 66% acc given modA for set up of comp strategy; repeated again with use of repetition: given a 5 min delay recalled with 33% acc given Felipe- regressed while being on hold for insurance auth/ modify goal     2. Pt will immediately recall various functional sentences, voicemails, and therapeutic activities using compensatory strategies with 80% acc given modA to improve immediate memory skills for safety, QOL, ADLs and dignity. Current: 36% acc CARY and increasing to 91% acc given modA/ x2 reps- -progressing/continue addressing    3. Pt will complete mental flexibility/alternating attention activities within 3 minutes with 90% acc given Felipe to improve attention for activites of choice, ADLs, and safety. Current:  (verbally) 3 words sequencin% acc CARY and increasing to 100% acc given Felipe; increased to 4 words: 43% acc CARY and increasing to 100% acc given mod cues/reps -progressing/continue addressing      4. Pt will utilize a preferred type of planner (physical or digital) to aid in development of a routine and schedule to reduce frustration and to increase predictability and structure throughout his days with magaly.    Current: Pt presents this date with a planner. SLP provided set up for easy finding month and weekly section. Pt educated re: placing appointments in planner; completed with upcoming ST appts. Additionally, pt advised to write homework assignment on today's day in week view. HEP place within planner.- slowly progressing/ modify goal     5.  Pt will complete various arthmitic/logical problem solving tasks with 85% acc given Felipe.   Current: (mathmetical/money management): 60% acc CARY and increasing to 80% acc given min-modA -progressing/continue addressing       Goals: to be achieved in 4 weeks:  1. Pt will recall x4/4 memory strategies and apply them to delayed recall tasks (3-4 words, picture retention, simple novel information) presented visually or verbally given a x3-5 minute delay with 80% acc given Felipe to improve short term memory skills for safety, QOL, ADLs, and dignity. 2. Pt will immediately recall various functional sentences, voicemails, and therapeutic activities using compensatory strategies with 80% acc given modA to improve immediate memory skills for safety, QOL, ADLs and dignity. 3. Pt will complete mental flexibility/alternating attention activities within 3 minutes with 90% acc given Felipe to improve attention for activites of choice, ADLs, and safety. 4. Pt will utilize a preferred type of planner to aid in development of a routine, schedule, plan ahead, and recall recent events x1 week to reduce frustration and to increase independence with daily executive functions with min-modA.   5. Pt will complete various arthmitic/logical problem solving tasks with 85% acc given Felipe.       Long Term Goals: To be accomplished in 4 weeks  1. Pt will complete various therapeutic activities to improve immediate and short term memory, mental flexibility/alternating attention skills, planning, and problem solving with 90% acc given Felipe to improve pt's QOL, ADLs, dignity, and safety. - slowly progressing.        ASSESSMENT:   The Brief Cognitive Assessment Tool (BCAT) was administered. The instrument assesses orientation, verbal recall, visual recognition, visual recall, attention, abstraction, language, executive functions, visuospacial processing in adults and older adult population. Results of the sub-tests: scored as correct/total possible.    Orientation: Patient was oriented to person, month, day of week, city, state, and situation: 5/6  Immediate verbal memory: 4/4 words; delayed verbal memory: 0/4words  (+2 with mod cues)  Visual recognition/naming 3 common objects: 3/3; delayed visual recall 2/3. (+1 with min cues)  Attention: tap when he heard a certain letter called out:   Mental control: count backwards 20-1:  Days of week backwards:   digits forward 2/2; digits backwards:   Abstraction: tell the similarities: 3/3   Language: repeat a sentence   Fluency: list girls names:   Executive: cognitive shiftin  Arithmetic reasonin  Judgement: (how much time to a lot to get to an appointment) :   Visuospatial; design:  Clock   Immediate story recall:  facts recalled:  Delayed story recall:   Story recognition: 3/5     Patient attained a score of  35 points out of a total of 50, indicating moderate cognitive deficits. Mr. Iva Brady was demo'ing functional gains during this reporting period. Unfortunately, he was placed on hold and not seen from --22 d/t awaiting insurance authorization. He did exhibit some regression while not attending skilled ST consistently, but upon is return, he still did demo a slight increase in his BCAT score (see above). Mr. Iva Brady would continue to benefit from skilled ST to further address his cognitive-linguistic deficits, as it is medically necessary for enhanced independence with ADLS, safety, QOL, and dignity.      Certification Period: 2022--22     ASSESSMENT/RECOMMENDATIONS:  [x]Continue therapy per initial plan/protocol at a frequency of  2 x per week for 4 weeks  []Continue therapy with the following recommended changes:_____________________      _____________________________________________________________________  []Discontinue therapy progressing towards or have reached established goals  []Discontinue therapy due to lack of appreciable progress towards goals  []Discontinue therapy due to lack of attendance or compliance  []Await Physician's recommendations/decisions regarding therapy  []Other:________________________________________________________________    Thank you for this referral.   KAYLA Holloway 6/20/2022 9:54 AM    NOTE TO PHYSICIAN:  PLEASE COMPLETE THE ORDERS BELOW AND   FAX TO [facility]: [phone]   If you are unable to process this request in 24 hours please contact our office: [phone]     []  I have read the above report and request that my patient continue as recommended. []  I have read the above report and request that my patient continue therapy with the following changes/special instructions:________________________________________  []I have read the above report and request that my patient be discharged from therapy.     [de-identified] Signature:____________Date:_________TIME:________    [employer], Date and Time must be completed for valid certification **

## 2022-06-28 ENCOUNTER — HOSPITAL ENCOUNTER (OUTPATIENT)
Dept: PHYSICAL THERAPY | Age: 42
Discharge: HOME | End: 2022-06-28
Payer: MEDICAID

## 2022-06-28 PROCEDURE — 97112 NEUROMUSCULAR REEDUCATION: CPT

## 2022-06-28 PROCEDURE — 97110 THERAPEUTIC EXERCISES: CPT

## 2022-06-28 PROCEDURE — 97530 THERAPEUTIC ACTIVITIES: CPT

## 2022-06-28 NOTE — PROGRESS NOTES
PHYSICAL THERAPY - DAILY TREATMENT NOTE    Patient Name: Esther Damon        Date: 2022  : 1980    Patient  Verified: YES  Visit #:     Insurance: Payor: Marina Romo / Plan: [employer] [place] [address] / Product Type: Managed Care Medicaid /      In time: 10:15 Out time: 11:05   Total Treatment Time: 50     Medicare Time Tracking (below)   Total Timed Codes (min):  - 1:1 Treatment Time:  -     TREATMENT AREA/ DIAGNOSIS = Other abnormalities of gait and mobility [R26.89]  Other symptoms and signs involving the musculoskeletal system [R29.898]    SUBJECTIVE   Pain Level (on 0 to 10 scale):  0  / 10   Medication Changes/New allergies or changes in medical history, any new surgeries or procedures? NO    If yes, update Summary List   Subjective Functional Status/Changes:  []  No changes reported     Pt reports that he is having no pain. Still having difficulty with walking. OBJECTIVE      20 min Therapeutic Exercise:  [x]  See flow sheet   Rationale:      increase ROM and increase strength to improve the patients ability to perform ADLs without pain     15 min Therapeutic Activity: [x]  See flow sheet   Rationale:    functional activities to improve the patients ability to perform ADLs without pain    15 min Neuromuscular Re-ed: [x]  See flow sheet   Rationale:    improve balance and increase proprioception to improve the patients ability to perform ADLs without pain    Billed With/As:   [x] TE   [] TA   [] Neuro   [] Self Care Patient Education: [x] Review HEP    [] Progressed/Changed HEP based on:   [] positioning   [] body mechanics   [] transfers   [] heat/ice application    [] other:        Other Objective/Functional Measures:    Impaired static balance on 2x4. Post Treatment Pain Level (on 0 to 10) scale:   0  / 10     ASSESSMENT  Assessment/Changes in Function:     Pt showing improved overall strength with ADLs.  Still having difficulty with balance     []  See Progress Note/Recertification   Patient will continue to benefit from skilled PT services to modify and progress therapeutic interventions, address functional mobility deficits, address ROM deficits, address strength deficits, analyze and address soft tissue restrictions and analyze and cue movement patterns to attain remaining goals.    Progress toward goals / Updated goals:    Good Progress to    [] STG    [x] LTG  1 as shown by improved safety with ADLs     PLAN  []  Upgrade activities as tolerated YES Continue plan of care   []  Discharge due to :    []  Other:      Therapist: Tyshawn Salas DPT     Date: 6/28/2022 Time: 10:16 AM        Future Appointments   Date Time Provider Myrna Taveras   6/29/2022  9:30 AM Ramonita Copeland [facility]   6/30/2022 10:45 AM River Levin PTA [facility]   7/1/2022  9:00 AM River Levin PTA [facility]   7/5/2022  9:15 AM Dillon Melena [facility]   7/6/2022  9:30 AM [address] [place] [facility]   7/7/2022  9:15 AM Dillon Melena [facility]   7/8/2022  9:45 AM River Levin PTA [facility]   7/11/2022  9:30 AM [address] [place] [facility]   7/12/2022  9:15 AM Dillon Melena [facility]   7/13/2022  9:30 AM [address] [place] [facility]   7/14/2022  9:15 AM Nova Maycruz [facility]   7/15/2022  9:45 AM River Levin PTA [facility]   7/18/2022  9:30 AM [address] [facility]   7/19/2022  9:15 AM River Levin [facility]   7/20/2022  9:30 AM [address] [place] [address] [facility]   7/21/2022  9:15 [facility]   7/25/2022  9:30 AM [address] [place] [address] [facility]   7/27/2022  9:30 AM [address] [facility]

## 2022-06-29 ENCOUNTER — HOSPITAL ENCOUNTER (OUTPATIENT)
Dept: PHYSICAL THERAPY | Age: 42
Discharge: HOME | End: 2022-06-29
Payer: MEDICAID

## 2022-06-29 PROCEDURE — 92507 TX SP LANG VOICE COMM INDIV: CPT

## 2022-06-29 NOTE — PROGRESS NOTES
ST DAILY TREATMENT NOTE    Patient Name: Dada Causey  Date:2022  : 1980  [x]  Patient  Verified  Payor: Payor: [facility] MEDICAID / Plan: [address] / Product Type: Managed Care Medicaid /   In time: 9:30 Out time:10:15  Total Treatment Time (min): 45  Visit #: 3 of 8  * PN 22*  (auth expires 22)    Treatment Diagnosis: Unspecified symptoms and signs involving cognitive functions and awareness [R41.9]    SUBJECTIVE  Pain Level (0-10 scale): 0  Any medication changes, allergies to medications, adverse drug reactions, diagnosis change, or new procedure performed?: [] No    [x] Yes (see summary sheet for update)      Subjective functional status/changes:   [] No changes reported  Pt presents today with planner and HEP completed for 2/2 sessions    OBJECTIVE  Treatment provided includes:  Increase/Improve:  []  Voice Quality [x]  Cognitive Linguistic Skills []  Laryngeal/Pharyngeal Exercises   []  Vocal Loudness []  Reading Comprehension []  Swallowing Skills    []  Vocal Cord Function []  Auditory Comprehension []  Oral Motor Skills   []  Resonance []  Writing Skills [x]  Compensatory strategies    []  Speech Intelligibility []  Expressive Language [x]  Attention   []  Breath Support/Coord.  []  Receptive language [x]  Memory   []  Articulation []  Safety Awareness [x] pt educ   []  Fluency []  Word Retrieval []        Treatment Provided:  -pt educ  -delayed recall  -immediate recall  -mental flexibility  -executive functioning    Patient/Caregiver  Education: [x] Review HEP      HEP/Handouts given: problem solving    Pain Level (0-10 scale) post treatment: 0    ASSESSMENT     []   Improving appropriately and progressing toward goals  [x]   Improving slowly and progressing toward goals  []   Approximating goals/maximum potential  [x]   Continues to benefit from skilled therapy to address remaining functional deficits  []   Not progressing toward goals and plan of care will be adjusted    Patient will continue to benefit from skilled therapy to address remaining functional deficits: cognitive-linguistics    Progress towards goals / Updated goals:  1. Pt will recall x4/4 memory strategies and apply them to delayed recall tasks [facility] retention, simple novel information) presented visually or verbally given a x5-7 minute delay with 80% acc given Felipe to improve short term memory skills for safety, QOL, ADLs, and dignity. Current 6/29/22: Pt able to recall x4 comp strategies using \"WRAP\". TRIAL #1: x3 words after a 3 min delay (pt utilized write it down/repetition): 33% acc given min-mod cues; 66% acc given modA/redirection    TRIAL #2: Mod skilled instruction for set up (instructed to utilize association and repetition- in which pt wrote numerous times): pt able to recall with 100% acc.      2. Pt will immediately recall various functional sentences, voicemails, and therapeutic activities using compensatory strategies with 80% acc given modA to improve immediate memory skills for safety, QOL, ADLs and dignity. Current 6/29/22: Visual simulated job ad: Given min cues/skilled instruction to utilize picture it: 66% acc. Recalling x4 words given 1 rep: 83% acc Felipe      3. Pt will complete mental flexibility/alternating attention activities within 3 minutes with 90% acc given Felipe to improve attention for activites of choice, ADLs, and safety. Current 6/29/22: 90% acc Ranjana given x1 rep and then fading to without reps     4. Pt will utilize a preferred type of planner to aid in development of a routine, schedule, plan ahead, and recall recent events x1 week to reduce frustration and to increase independence with daily executive functions with min-modA.   Current 6/29/22:   Recall: Using planner [address] to recall x1 event from yesterday, but unable to give any details. ModA to set up writing down in order to recall x3 events daily.      Schedule/ plan: Set up additionally provided for next week 2* pt will not be seen x7 days d/t the 4th of July holiday.      5.  Pt will complete various arthmitic/logical problem solving tasks with 85% acc given Felipe.   Current 6/29/22: Not targeted this date    PLAN  [x]  Continue plan of care  []  Modify Goals/Treatment Plan      []  Discharge due to:  [] Other:    Reyes Boer, SLP 6/29/2022  9:35 AM  MA, CCC-SLP  Speech-Language Pathologist    Future Appointments   Date Time Provider Myrna Taveras   6/30/2022 10:45 AM Gary Bey PTA [facility]   7/1/2022  9:00 AM Gary Bey, PTA [facility]   7/5/2022  9:15 AM Ethlyn Angers [facility]   7/6/2022  9:30 AM [address] [facility]   7/7/2022  9:15 AM Ethlyn Angers [facility]   7/8/2022  9:45 AM Gary Bey, PTA [facility]   7/11/2022  9:30 AM [facility] [place] [facility]   7/12/2022  9:15 AM Ethlyn Angers [facility]   7/13/2022  9:30 AM [address] [facility]   7/14/2022  9:15 AM Ethlyn Angers [facility]   7/15/2022  9:45 AM Gary Sotero, PTA [facility]   7/18/2022  9:30 AM [address] [facility]   7/19/2022  9:15 AM Gary Bey PTA [facility]   7/20/2022  9:30 AM [address] [facility]   7/21/2022  9:15 AM Jayde Fernandez [facility]   7/25/2022  9:30 AM [address] [facility]   7/27/2022  9:30 AM [address] [facility]

## 2022-06-30 ENCOUNTER — HOSPITAL ENCOUNTER (OUTPATIENT)
Dept: PHYSICAL THERAPY | Age: 42
Discharge: HOME | End: 2022-06-30
Payer: MEDICAID

## 2022-06-30 PROCEDURE — 97112 NEUROMUSCULAR REEDUCATION: CPT

## 2022-06-30 PROCEDURE — 97110 THERAPEUTIC EXERCISES: CPT

## 2022-06-30 PROCEDURE — 97530 THERAPEUTIC ACTIVITIES: CPT

## 2022-06-30 PROCEDURE — 97535 SELF CARE MNGMENT TRAINING: CPT

## 2022-06-30 NOTE — PROGRESS NOTES
PHYSICAL THERAPY - DAILY TREATMENT NOTE     Patient Name: Tod Hoffman        Date: 2022  : 1980   YES Patient  Verified  Visit #:     Insurance: Payor: Marielos Subramanian / Plan: [employer] [facility] / Product Type: Managed Care Medicaid /      In time: 10:50 am Out time: 11:44    Total Treatment Time: 54     Medicare/BCBS Time Tracking (below)   Total Timed Codes (min):  - 1:1 Treatment Time:  -     TREATMENT AREA =  Other abnormalities of gait and mobility [R26.89]  Other symptoms and signs involving the musculoskeletal system [R29.898]    SUBJECTIVE    Pain Level (on 0 to 10 scale):  0  / 10   Medication Changes/New allergies or changes in medical history, any new surgeries or procedures? NO    If yes, update Summary List   Subjective Functional Status/Changes:  []  No changes reported     Challenged in static balance when trying to do something in front of him. Soreness in bilateral thighs after workout yesterday.        OBJECTIVE  Modalities Rationale:  N/a     14 min Therapeutic Exercise:  [x]  See flow sheet   Rationale:      increase ROM, increase strength, improve coordination, improve balance and increase proprioception to improve the patients ability to perform functional ADLs     16 min Therapeutic Activity: [x]  Pt education in sitting hip ER with self ankle ROM and mobillization, effleurage ms technique to lower calf   Rationale:      increase ROM, increase strength, improve coordination and increase proprioception to improve the patients ability to perform functional ADLs     14 min Neuromuscular Re-ed: [x]  NMR for ankle ROM all planes including isometrics; sitting ankle and knee ROM with DF/PF    Rationale:      increase ROM, increase strength, improve coordination, improve balance and increase proprioception to improve the patients ability to perform functional ADLs with decreased fall risk       10 min Billed With/As:   [] TE   [] TA   [] Neuro   [] Self Care Patient Education: [x] Review HEP , activity modification, self progression of HEP as tolerate   [] Progressed/Changed HEP based on:   [] positioning   [] body mechanics   [] transfers   [] heat/ice application    [] other:        Other Objective/Functional Measures:    Review self ankle ROM/mobiliization     Post Treatment Pain Level (on 0 to 10) scale:   0  / 10     ASSESSMENT    Assessment/Changes in Function:     Mild fatigue in mini squats; pt demonstrating improving knowledge of self ankle ROM and effleurage massage after instruction. Verbal and tactile cues and demo for isolation of ankle ROM,   Minimal/Trace movement in DF on left. []  See Progress Note/Recertification   Patient will continue to benefit from skilled PT services to modify and progress therapeutic interventions, address functional mobility deficits, address ROM deficits, address strength deficits, analyze and address soft tissue restrictions, analyze and cue movement patterns, analyze and modify body mechanics/ergonomics, assess and modify postural abnormalities and instruct in home and community integration to attain remaining goals. Progress toward goals / Updated goals:    Good Progress to    [] STG    [x] LTG  1 as shown by improving dynamic balance stability.  Pt challenged in static balance ankle stability       PLAN    [x]  Upgrade activities as tolerated YES Continue plan of care   []  Discharge due to :    []  Other:      Therapist: Alyson Neal PTA    Date: 6/30/2022 Time: 11:44 AM     Future Appointments   Date Time Provider Myrna Taveras   7/1/2022  9:00 AM Ronda Ferguson PTA [facility]   7/5/2022  9:15 AM Saddie Neve [facility]   7/6/2022  9:30 AM [address] [facility]   7/7/2022  9:15 AM Dm Mihai [facility]   7/8/2022  9:45 AM Ronda Ferguson PTA [facility]   7/11/2022  9:30 AM [address] [place] [facility]   7/12/2022  9:15 AM Saddie Neve [facility]   7/13/2022  9:30 AM [facility] PT [address] [facility]   7/14/2022  9:15 AM [facility]   7/15/2022  9[facility]45 [facility]   7/18/2022  9:30 AM [address] [facility]   7/19/2022  9:15 [facility]   7/20/2022  9:30 AM [address] [facility]   7/21/2022  9:15 AM [facility]   7/25/2022  9:30 AM [facility] [place] [facility]   7/27/2022  9:30 AM [address] [facility]

## 2022-07-01 ENCOUNTER — HOSPITAL ENCOUNTER (OUTPATIENT)
Dept: PHYSICAL THERAPY | Age: 42
Discharge: HOME | End: 2022-07-01
Payer: MEDICAID

## 2022-07-01 ENCOUNTER — APPOINTMENT (OUTPATIENT)
Dept: PHYSICAL THERAPY | Age: 42
End: 2022-07-01
Payer: MEDICAID

## 2022-07-01 PROCEDURE — 97110 THERAPEUTIC EXERCISES: CPT

## 2022-07-01 PROCEDURE — 97530 THERAPEUTIC ACTIVITIES: CPT

## 2022-07-01 PROCEDURE — 97112 NEUROMUSCULAR REEDUCATION: CPT

## 2022-07-01 NOTE — PROGRESS NOTES
PHYSICAL THERAPY - DAILY TREATMENT NOTE     Patient Name: Heron Valdivia        Date: 2022  : 1980   YES Patient  Verified  Visit #: 32   of   30  Insurance: Payor: [address] / Plan: Madhav Sahu / Product Type: Managed Care Medicaid /      In time: 10:43 am Out time: 11:25 am   Total Treatment Time: 42     Medicare/BCBS Time Tracking (below)   Total Timed Codes (min):  - 1:1 Treatment Time: -     TREATMENT AREA =  Other abnormalities of gait and mobility [R26.89]  Other symptoms and signs involving the musculoskeletal system [R29.898]    SUBJECTIVE    Pain Level (on 0 to 10 scale): 0  / 10   Medication Changes/New allergies or changes in medical history, any new surgeries or procedures?     NO    If yes, update Summary List   Subjective Functional Status/Changes:  []  No changes reported       Pt reporting just some mild soreness today       OBJECTIVE  Modalities Rationale:   N/a     20 min Therapeutic Exercise:  [x]  See flow sheet   Rationale:  increase ROM, increase strength, improve coordination, improve balance and increase proprioception to improve the patients ability to perform functional ADLs with decreased fall risk     9 min Therapeutic Activity: [x]  See flow sheet   Rationale:   increase ROM, increase strength, improve coordination, improve balance and increase proprioception to improve the patients ability to perform functional ADLs with decreased fall risk     13 min Neuromuscular Re-ed: [x]  See flow sheet   Rationale:   increase ROM, increase strength, improve coordination, improve balance and increase proprioception to improve the patients ability to perform functional ADLs with decreased fall risk       Billed With/As:   [] TE   [] TA   [] Neuro   [] Self Care Patient Education: [x] Review HEP    [] Progressed/Changed HEP based on:   [] positioning   [] body mechanics   [] transfers   [] heat/ice application    [] other:        Other Objective/Functional Measures:  Resume 2x4 static stance with intermittent use of UE for safety/support     Post Treatment Pain Level (on 0 to 10) scale:  0  / 10     ASSESSMENT    Assessment/Changes in Function:   Pt came 1 hour and 43 min late for scheduled apt. Advanced LE strengthening and balance per flow sheet. Emphasized use of HEP for continued progressed toward goals. Mild difficulty in static balance on 2x4  Pt seen for three PT visits this week for continued progression of strength and balance for continued progression toward all LTGs    []  See Progress Note/Recertification   Patient will continue to benefit from skilled PT services to modify and progress therapeutic interventions, address functional mobility deficits, address ROM deficits, address strength deficits, analyze and address soft tissue restrictions, analyze and cue movement patterns, analyze and modify body mechanics/ergonomics, address imbalance/dizziness and instruct in home and community integration to attain remaining goals.       Progress toward goals / Updated goals:    Good Progress to    [] STG    [x] LTG  1-3 as shown by improving safety in ambulation without AD for short distances       PLAN    [x]  Upgrade activities as tolerated YES Continue plan of care   []  Discharge due to :    []  Other:      Therapist: Loree Almonte PTA    Date: 7/1/2022 Time: 11:25  PM     Future Appointments   Date Time Provider Myrna Taveras   7/5/2022  9:15 AM Genetta Sprung [facility]   7/6/2022  9:30 AM [address] [facility]   7/7/2022  9:15 AM Genetta Sprung [facility]   7/8/2022  9:45 AM Fior Jules PTA [facility]   7/11/2022  9:30 AM [address] [facility]   7/12/2022  9:15 AM Genetta Sprung [facility]   7/13/2022  9:30 AM [address] [facility]   7/14/2022  9:15 AM Genetta Sprung [facility]   7/15/2022  9:45 AM Fior Jules PTA [facility]   7/18/2022  9:30 AM Ramonita Copeland [facility] 7/19/2022  9:15 AM Landry Soler, [facility]   7/20/2022  9:30 AM [address] [facility]   7/21/2022  9:15 AM Misael Quevedo [facility]   7/25/2022  9:30 AM [address] [facility]   7/27/2022  9:30 AM [facility] PT [address] [facility]

## 2022-07-05 ENCOUNTER — APPOINTMENT (OUTPATIENT)
Dept: PHYSICAL THERAPY | Age: 42
End: 2022-07-05
Payer: MEDICAID

## 2022-07-06 ENCOUNTER — HOSPITAL ENCOUNTER (OUTPATIENT)
Dept: PHYSICAL THERAPY | Age: 42
Discharge: HOME | End: 2022-07-06
Payer: MEDICAID

## 2022-07-06 PROCEDURE — 92507 TX SP LANG VOICE COMM INDIV: CPT

## 2022-07-06 NOTE — PROGRESS NOTES
ST DAILY TREATMENT NOTE    Patient Name: Gonzalo Goetz  Date:2022  : 1980  [x]  Patient  Verified  Payor: Payor: Joya Babinski / Plan: [address] [place] / Product Type: Managed Care Medicaid /   In time: 9:30 Out time:10:15  Total Treatment Time (min): 45  Visit #: 4 of 8  * PN 22*  (auth expires 22)    Treatment Diagnosis: Unspecified symptoms and signs involving cognitive functions and awareness [R41.9]    SUBJECTIVE  Pain Level (0-10 scale): 0  Any medication changes, allergies to medications, adverse drug reactions, diagnosis change, or new procedure performed?: [] No    [x] Yes (see summary sheet for update)      Subjective functional status/changes:   [] No changes reported  Pt presents today with planner and HEP completed for 2/2 sessions    OBJECTIVE  Treatment provided includes:  Increase/Improve:  []  Voice Quality [x]  Cognitive Linguistic Skills []  Laryngeal/Pharyngeal Exercises   []  Vocal Loudness []  Reading Comprehension []  Swallowing Skills    []  Vocal Cord Function []  Auditory Comprehension []  Oral Motor Skills   []  Resonance []  Writing Skills [x]  Compensatory strategies    []  Speech Intelligibility []  Expressive Language [x]  Attention   []  Breath Support/Coord.  []  Receptive language [x]  Memory   []  Articulation []  Safety Awareness [x] pt educ   []  Fluency []  Word Retrieval []        Treatment Provided:  -pt educ  -delayed recall  -immediate recall  -mental flexibility  -executive functioning    Patient/Caregiver  Education: [x] Review HEP      HEP/Handouts given: problem solving    Pain Level (0-10 scale) post treatment: 0    ASSESSMENT     []   Improving appropriately and progressing toward goals  [x]   Improving slowly and progressing toward goals  []   Approximating goals/maximum potential  [x]   Continues to benefit from skilled therapy to address remaining functional deficits  []   Not progressing toward goals and plan of care will be adjusted    Patient will continue to benefit from skilled therapy to address remaining functional deficits: cognitive-linguistics    Progress towards goals / Updated goals:  1. Pt will recall x4/4 memory strategies and apply them to delayed recall tasks [facility] retention, simple novel information) presented visually or verbally given a x5-7 minute delay with 80% acc given Felipe to improve short term memory skills for safety, QOL, ADLs, and dignity. Current 7/6/22: Pt able to recall x4 comp strategies using \"WRAP\". x3 words after a 5 min delay: 66% acc Ranjana and increasing to 100% acc given min cues. 2. Pt will immediately recall various functional sentences, voicemails, and therapeutic activities using compensatory strategies with 80% acc given modA to improve immediate memory skills for safety, QOL, ADLs and dignity. Current 7/6/22: Recalling driving [id number]:27% acc given x2 reps and increasing to 83% acc given mod cues.      3. Pt will complete mental flexibility/alternating attention activities within 3 minutes with 90% acc given Felipe to improve attention for activites of choice, ADLs, and safety. Current 7/6/22: Not targeted this date     4. Pt will utilize a preferred type of planner to aid in development of a routine, schedule, plan ahead, and recall recent events x1 week to reduce frustration and to increase independence with daily executive functions with min-modA.   Current 7/6/22: Address next session    5.  Pt will complete various arithmetic/logical problem solving tasks with 85% acc given Felipe.   Current 7/6/22: arithmetic: 67% acc RANJANA and increasing to 78% acc given min-mod cues/ reps/redirection    PLAN  [x]  Continue plan of care  []  Modify Goals/Treatment Plan      []  Discharge due to:  [] Other:    KAYLA Hunt 7/6/2022  9:35 AM  MA, CCC-SLP  Speech-Language Pathologist    Future Appointments   Date Time Provider Myrna Taveras   7/7/2022  9:15 AM Kelsie Levin [facility]   7/8/2022  9:45 AM Rynea Camila, [facility]   7/11/2022  9:30 AM [address] [facility]   7/12/2022  9:15 AM [facility]   7/13/2022  9:30 AM [facility] [place]   7/14/2022  9:15 AM [facility]   7/15/2022  9:45 [facility]   7/18/2022  9:30 AM [address] [facility]   7/19/2022  9:15 AM Rynea Camila, [facility]   7/20/2022  9:30 AM [address] [facility]   7/21/2022  9:15 AM [facility]   7/25/2022  9:30 AM [facility] [place] [facility] [place]   7/27/2022  9:30 AM [address] [facility] [place]

## 2022-07-07 ENCOUNTER — TELEPHONE (OUTPATIENT)
Dept: PHYSICAL THERAPY | Age: 42
End: 2022-07-07

## 2022-07-07 ENCOUNTER — HOSPITAL ENCOUNTER (OUTPATIENT)
Dept: PHYSICAL THERAPY | Age: 42
Discharge: HOME | End: 2022-07-07
Payer: MEDICAID

## 2022-07-07 PROCEDURE — 97530 THERAPEUTIC ACTIVITIES: CPT

## 2022-07-07 PROCEDURE — 97112 NEUROMUSCULAR REEDUCATION: CPT

## 2022-07-07 PROCEDURE — 97110 THERAPEUTIC EXERCISES: CPT

## 2022-07-07 NOTE — PROGRESS NOTES
PHYSICAL THERAPY - DAILY TREATMENT NOTE    Patient Name: Nadir Muhammad        Date: 2022  : 1980    Patient  Verified: YES  Visit #:   32   of   34  Insurance: Payor: Madelyn Meza / Plan: [employer] [facility] / Product Type: Managed Care Medicaid /      In time: 9:15 Out time: 10:05   Total Treatment Time: 50     Medicare Time Tracking (below)   Total Timed Codes (min):  - 1:1 Treatment Time:  -     TREATMENT AREA/ DIAGNOSIS = Other abnormalities of gait and mobility [R26.89]  Other symptoms and signs involving the musculoskeletal system [R29.898]    SUBJECTIVE   Pain Level (on 0 to 10 scale):  0  / 10   Medication Changes/New allergies or changes in medical history, any new surgeries or procedures? NO    If yes, update Summary List   Subjective Functional Status/Changes:  []  No changes reported     Pt reports difficulty with balance and walking. OBJECTIVE      15 min Therapeutic Exercise:  [x]  See flow sheet   Rationale:      increase ROM and increase strength to improve the patients ability to perform ADLs without pain     15 min Therapeutic Activity: [x]  See flow sheet   Rationale:    functional activities to improve the patients ability to perform ADLs without pain    10 min Neuromuscular Re-ed: [x]  See flow sheet   Rationale:    improve coordination, improve balance and increase proprioception to improve the patients ability to perform ADLs without pain    Billed With/As:   [x] TE   [] TA   [] Neuro   [] Self Care Patient Education: [x] Review HEP    [] Progressed/Changed HEP based on:   [] positioning   [] body mechanics   [] transfers   [] heat/ice application    [] other:        Other Objective/Functional Measures:    Initiated more static balance exercises.    Post Treatment Pain Level (on 0 to 10) scale:   0  / 10     ASSESSMENT  Assessment/Changes in Function:     Pt showing decreased overall dynamic and static balance exercises      []  See Progress Note/Recertification   Patient will continue to benefit from skilled PT services to modify and progress therapeutic interventions, address functional mobility deficits, address ROM deficits, address strength deficits, analyze and address soft tissue restrictions and analyze and cue movement patterns to attain remaining goals.    Progress toward goals / Updated goals:    Good Progress to    [] STG    [x] LTG  1 as shown by improved overall strength needed for ADLs     PLAN  [x]  Upgrade activities as tolerated YES Continue plan of care   []  Discharge due to :    []  Other:      Therapist: Galdino Pimentel DPT     Date: 7/7/2022 Time: 12:06 PM        Future Appointments   Date Time Provider Myrna Taveras   7/8/2022  9:45 AM Yanely Roberson PTA [facility]   7/11/2022  9:30 AM [address] [facility]   7/12/2022  9:15 AM Metta Backer [facility]   7/13/2022  9:30 AM [address] [place]   7/14/2022  9:15 AM Terri Cheatham [facility]   7/15/2022  9:45 AM Yanely Roberson PTA [facility]   7/18/2022  9:30 AM [address] [facility]   7/19/2022  9:15 AM Yanely Roberson PTA [facility]   7/20/2022  9:30 AM [address] [place] [facility]   7/21/2022  9:15 AM Metta Backer [facility]   7/25/2022  9:30 AM [address] [place] [facility]   7/27/2022  9:30 AM [address] [facility]

## 2022-07-08 ENCOUNTER — HOSPITAL ENCOUNTER (OUTPATIENT)
Dept: PHYSICAL THERAPY | Age: 42
Discharge: HOME | End: 2022-07-08
Payer: MEDICAID

## 2022-07-08 PROCEDURE — 97530 THERAPEUTIC ACTIVITIES: CPT

## 2022-07-08 PROCEDURE — 97110 THERAPEUTIC EXERCISES: CPT

## 2022-07-08 PROCEDURE — 97112 NEUROMUSCULAR REEDUCATION: CPT

## 2022-07-08 NOTE — PROGRESS NOTES
PHYSICAL THERAPY - DAILY TREATMENT NOTE     Patient Name: Gurvinder Rendon        Date: 2022  : 1980   YES Patient  Verified  Visit #:      12  Insurance: Payor: Gisele Ganser / Plan: [address] [place] [address] / Product Type: Managed Care Medicaid /      In time: 9:43 am Out time: 10:31 am   Total Treatment Time: 48     Medicare/BCBS Time Tracking (below)   Total Timed Codes (min): - 1:1 Treatment Time:  -     TREATMENT AREA =  Other abnormalities of gait and mobility [R26.89]  Other symptoms and signs involving the musculoskeletal system [R29.898]    SUBJECTIVE    Pain Level (on 0 to 10 scale): 0  / 10   Medication Changes/New allergies or changes in medical history, any new surgeries or procedures? NO    If yes, update Summary List   Subjective Functional Status/Changes:  []  No changes reported       Pt reports mild soreness from workout yesterday         OBJECTIVE  Modalities Rationale:  N/a      23 min Therapeutic Exercise:  [x]  See flow sheet   Rationale:      increase ROM, increase strength, improve coordination, improve balance and increase proprioception to improve the patients ability to perform functional ADLs      8 min Therapeutic Activity: [x]  Review hip hinge in partial squats, transfers sit to stand   Rationale:      increase ROM, increase strength and improve coordination to improve the patients ability to perform functional ADLs      17 min Neuromuscular Re-ed: [x]  See flow sheet   Rationale:      increase ROM, increase strength, improve coordination, improve balance and increase proprioception to improve the patients ability to perform functional ADLs         Billed With/As:   [] TE   [] TA   [] Neuro   [] Self Care Patient Education: [x] Review HEP    [] Progressed/Changed HEP based on:   [] positioning   [] body mechanics   [] transfers   [] heat/ice application    [] other:        Other Objective/Functional Measures:     Add TRX squats     Post Treatment Pain Level (on 0 to 10) scale:   0  / 10     ASSESSMENT    Assessment/Changes in Function:   Pt continues to be challenged in static standing     []  See Progress Note/Recertification   Patient will continue to benefit from skilled PT services to modify and progress therapeutic interventions, address functional mobility deficits, address ROM deficits, address strength deficits, analyze and address soft tissue restrictions, analyze and cue movement patterns, address imbalance/dizziness and instruct in home and community integration to attain remaining goals.       Progress toward goals / Updated goals:    Good Progress to    [] STG    [x] LTG  1-3 as shown by improving hip control in standing 3 way hip       PLAN    [x]  Upgrade activities as tolerated YES Continue plan of care   []  Discharge due to :    []  Other:      Therapist: Iris Maher PTA    Date: 7/8/2022 Time: 10:31  AM     Future Appointments   Date Time Provider Myrna Taveras   7/11/2022  9:30 AM [address] [facility]   7/12/2022  9:15 AM Vickie Mage [facility]   7/13/2022  9:30 AM [address] [facility]   7/14/2022  9:15 AM Vickie Mage [facility]   7/15/2022  9:45 AM Obadierum Saxena PTA [facility]   7/18/2022  9:30 AM [address] [facility]   7/19/2022  9:15 AM Obadierum Saxena PTA [facility]   7/20/2022  9:30 AM [facility] [facility]   7/21/2022  9:15 AM Vickie Mage [facility]   7/25/2022  9:30 AM [address] [facility]   7/27/2022  9:30 AM [address] [facility]   8/11/2022  2:00 PM Faraz Bautista NP [facility]

## 2022-07-11 ENCOUNTER — APPOINTMENT (OUTPATIENT)
Dept: PHYSICAL THERAPY | Age: 42
End: 2022-07-11
Payer: MEDICAID

## 2022-07-12 ENCOUNTER — APPOINTMENT (OUTPATIENT)
Dept: PHYSICAL THERAPY | Age: 42
End: 2022-07-12
Payer: MEDICAID

## 2022-07-14 ENCOUNTER — APPOINTMENT (OUTPATIENT)
Dept: PHYSICAL THERAPY | Age: 42
End: 2022-07-14
Payer: MEDICAID

## 2022-07-18 ENCOUNTER — APPOINTMENT (OUTPATIENT)
Dept: PHYSICAL THERAPY | Age: 42
End: 2022-07-18
Payer: MEDICAID

## 2022-07-19 ENCOUNTER — APPOINTMENT (OUTPATIENT)
Dept: PHYSICAL THERAPY | Age: 42
End: 2022-07-19
Payer: MEDICAID

## 2022-07-20 ENCOUNTER — APPOINTMENT (OUTPATIENT)
Dept: PHYSICAL THERAPY | Age: 42
End: 2022-07-20
Payer: MEDICAID

## 2022-07-21 ENCOUNTER — APPOINTMENT (OUTPATIENT)
Dept: PHYSICAL THERAPY | Age: 42
End: 2022-07-21
Payer: MEDICAID

## 2022-07-25 ENCOUNTER — APPOINTMENT (OUTPATIENT)
Dept: PHYSICAL THERAPY | Age: 42
End: 2022-07-25
Payer: MEDICAID

## 2022-07-27 ENCOUNTER — APPOINTMENT (OUTPATIENT)
Dept: PHYSICAL THERAPY | Age: 42
End: 2022-07-27
Payer: MEDICAID

## 2022-08-05 NOTE — PROGRESS NOTES
[facility] PHYSICAL THERAPY AT [address], [address], [address], [address] [place] [address]  Phone: [phone]  Fax: [phone] SUMMARY  Patient Name: Katherine Solorio : 1980   Treatment/Medical Diagnosis: Other abnormalities of gait and mobility [R26.89]  Other symptoms and signs involving the musculoskeletal system [R29.898]   Referral Source: Darvin Homans, MD     Date of Initial Visit: 2022 Attended Visits: 28 Missed Visits: 6+     SUMMARY OF TREATMENT  PT consisted of manual therapy techniques, therapeutic exercises, and modalities to improve strength, improve mobility, decrease pain, and improve overall function. CURRENT STATUS  Pt was seen for a total of 28 visits. Pt cancelled/no showed his final 5 appointments. Unable to formally assess goals. Pt was still ambulating with SPC with B AFOs for safety. Pt still demonstrating decreased DF strength bilaterally. Pt showing decreased overall dynamic balance still. Due to non-compliance/non-attendance and authorization ending for insurance, the pt is to be discharged  RECOMMENDATIONS  Discontinue therapy due to lack of attendance or compliance. If you have any questions/comments please contact us directly at [phone]. Thank you for allowing us to assist in the care of your patient. Therapist Signature: Felisa Ellington Date: 2022. Time: 12:33 PM    NOTE TO PHYSICIAN:  Your patient's insurance requires this discharge note be signed and returned. PLEASE COMPLETE THE ORDERS BELOW AND RETURN TO:  [facility] [place] PHYSICAL THERAPY    ___ I have read the above report and request that my patient be discharged from therapy.      Physician Signature:        Date:       Time:

## 2022-08-11 ENCOUNTER — OFFICE VISIT (OUTPATIENT)
Dept: NEUROLOGY | Age: 42
End: 2022-08-11
Payer: COMMERCIAL

## 2022-08-11 VITALS
RESPIRATION RATE: 18 BRPM | HEART RATE: 80 BPM | HEIGHT: 78 IN | SYSTOLIC BLOOD PRESSURE: 110 MMHG | DIASTOLIC BLOOD PRESSURE: 64 MMHG | BODY MASS INDEX: 30.89 KG/M2 | OXYGEN SATURATION: 97 % | WEIGHT: 267 LBS

## 2022-08-11 DIAGNOSIS — R41.3 MEMORY LOSS: ICD-10-CM

## 2022-08-11 DIAGNOSIS — R20.2 TINGLING: ICD-10-CM

## 2022-08-11 DIAGNOSIS — R41.9 COGNITIVE COMPLAINTS: Primary | ICD-10-CM

## 2022-08-11 DIAGNOSIS — R26.9 GAIT ABNORMALITY: ICD-10-CM

## 2022-08-11 PROCEDURE — 99214 OFFICE O/P EST MOD 30 MIN: CPT | Performed by: NURSE PRACTITIONER

## 2022-08-11 RX ORDER — BUSPIRONE HYDROCHLORIDE 10 MG/1
TABLET ORAL
COMMUNITY
Start: 2022-07-21

## 2022-08-11 RX ORDER — OXCARBAZEPINE 300 MG/1
TABLET, FILM COATED ORAL
COMMUNITY
Start: 2022-08-03

## 2022-08-11 RX ORDER — DEXTROAMPHETAMINE SACCHARATE, AMPHETAMINE ASPARTATE, DEXTROAMPHETAMINE SULFATE AND AMPHETAMINE SULFATE 1.25; 1.25; 1.25; 1.25 MG/1; MG/1; MG/1; MG/1
5 TABLET ORAL 2 TIMES DAILY
COMMUNITY

## 2022-08-11 RX ORDER — PROPRANOLOL HYDROCHLORIDE 10 MG/1
TABLET ORAL
COMMUNITY
Start: 2022-08-10

## 2022-08-11 RX ORDER — GABAPENTIN 300 MG/1
300 CAPSULE ORAL
Qty: 30 CAPSULE | Refills: 5 | Status: SHIPPED | OUTPATIENT
Start: 2022-08-11

## 2022-08-11 NOTE — PROGRESS NOTES
Raina Alvarez presents today for   Chief Complaint   Patient presents with    Memory Loss    Neurologic Problem       Is someone accompanying this pt? yes    Is the patient using any DME equipment during [address]? no    Depression Screening:  3 most recent PHQ Screens 12/6/2021   Little interest or pleasure in doing things Not at all   Feeling down, depressed, irritable, or hopeless Not at all   Total Score PHQ 2 0       Learning Assessment:  No flowsheet data found. Abuse Screening:  No flowsheet data found. Fall Risk  No flowsheet data found. Coordination of Care:  1. Have you been to the ER, urgent care clinic since your last visit? Hospitalized since your last visit? no    2. Have you seen or consulted any other health care providers outside of the [address] [place] [address] since your last visit? Include any pap smears or colon screening.  no

## 2022-08-11 NOTE — PROGRESS NOTES
[facility]  [address], [address], [place], [address]  [address] [place], [address]  Office:  [phone]  Fax: [phone]  Chief Complaint   Patient presents with    Memory Loss    Neurologic Problem       HPI: Noreen Gottron presents in follow-up. He was last seen here on 3/3/2022 in new patient evaluation for memory impairment. He has history of heroin overdose in September 2021. He was admitted at [facility] at that time for overdose and had complications of acute hypoxic respiratory failure, bilateral lower extremity compartment syndrome, and memory loss. He is status post rhabomyolysis/ bilateral lower extremities compartment syndrome s/p four compartment fasciotomy in the setting of heroin overdose. He is in a methadone program.  He has history of depression and follows with psychiatry. At the last visit, we planned to obtain work-up with MRI of the brain. He had a CT of the head at initial onset which was unremarkable. Labs were ordered for vitamin B12 level and thyroid function. EEG ordered. He was referred for neuropsychological evaluation and was referred to outpatient speech therapy for cognition. MRI brain was normal.  TSH normal.  Vitamin B12 346. Folate normal.    He presents today in follow-up. He is with his mother. He reports his memory is getting a lot better little by little. For example, with watching TV, he can remember the whole show from beginning to end. Reports he still gets 'lost in time' such as with completing tasks. Things need to be repeated to him. He reports he still has trouble with distractions. He gets confused and irritated. He gets embarrassed. He does not know why he gets emotional all of a sudden. It has to do with sound. Also with different environments. He gets agitated in the car with her if he thinks she made a wrong turn. He gets tense.   Reports the Zyprexa settled him down but he gained too much weight with it. He is seeing a psychiatrist now, Dr. Edwina Bonner at [facility] [place]. \[facility].\"". He had COVID and he has been out of speech therapy since that time. Speech therapy was helpful. He would like PT resumed. His walking is coming along. Reports he can ambulate pretty well with the cane. He denies falls. He is wondering about the circulation in his legs. Reports a painful stinging feeling affecting both feet. He takes the gabapentin rarely. Reports his feet swell sometimes and left leg will swell. He will get the sensation that he is being stone out of nowhere. Reports he gets flustered and flushed. Past Medical History:   Diagnosis Date    GERD (gastroesophageal reflux disease)        Past Surgical History:   Procedure Laterality Date    HX ORTHOPAEDIC      SURGERY       Current Outpatient Medications   Medication Sig Dispense Refill    propranoloL (INDERAL) 10 mg tablet       OXcarbazepine (TRILEPTAL) 300 mg tablet       busPIRone (BUSPAR) 10 mg tablet       dextroamphetamine-amphetamine (AdderalL) 5 mg tablet Take 5 mg by mouth two (2) times a day.      gabapentin (NEURONTIN) 300 mg capsule Take 1 Capsule by mouth nightly. Max Daily Amount: 300 mg. 30 Capsule 5    methadone HCl (METHADONE, BULK,) by Does Not Apply route. escitalopram oxalate (LEXAPRO) 10 mg tablet Take 10 mg by mouth daily. naproxen sodium (NAPROSYN) 220 mg tablet Take 220 mg by mouth two (2) times daily (with meals). (Patient not taking: No sig reported)          No Known Allergies    Social History     Tobacco Use    Smoking status: Former     Packs/day: 1.00     Years: 4.00     Pack years: 4.00     Types: Cigarettes    Smokeless tobacco: Never    Tobacco comments:     pt does vape   Substance Use Topics    Alcohol use: Not Currently     Comment: hx of excessive use. Drug use: Not Currently       History reviewed. No pertinent family history.     Review of Systems:  GENERAL: Denies fever or fatigue  CARDIAC: No CP or SOB  PULMONARY: No cough or SOB  MUSCULOSKELETAL: No new joint pain  NEURO: SEE HPI    Physical Examination:  Visit Vitals  /64   Pulse 80   Resp 18   Ht 6' 6\" (1.981 m)   Wt 121.1 kg (267 lb)   SpO2 97%   BMI 30.85 kg/m²       Alert, in NAD. Heart is regular. Oriented x3. Speech is fluent. Speech clear. EOMs are full, PERRL, VFFTC, no nystagmus. No facial asymmetry. Tongue is midline. Strength and tone are normal except ankle DF weakness/ bilateral AFOs in place. No drift of the bilateral upper extremities. Fine finger movements symmetrical. FNF intact bilaterally. Mild bilateral upper extremities action tremor. DTRs +2. Ambulates with cane. Bilateral lower extremities healed incisions. MRI BRAIN WO CONT 6/5/2022:  Study Result    Narrative & Impression   EXAM: MRI BRAIN WO CONT     CLINICAL INDICATION/HISTORY: Memory loss status post encephalopathic episode has  hospitalization related to hair with overdose respiratory failure cardiac arrest     TECHNIQUE: Multisequence multiplanar MR imaging acquired through the brain. Contrast used: None     COMPARISON: None     FINDINGS:     Parenchyma: Special attention to the hippocampal regions demonstrates no  evidence of diffusion abnormality No acute infarction. No acute hemorrhage. No  mass lesion. CSF spaces: Ventricles and cisterns remain midline in position     IAC regions: Unremarkable     Parasellar region: Unremarkable     Vasculature: Appropriate flow voids within the major skull base vasculature. Cervicomedullary junction: Patent     Orbits: Unremarkable     Paranasal sinuses: Clear           IMPRESSION     1. No acute infarction or bleed is seen     Morphologically normal appearance       Impression/Plan: This is a 53-year-old right-handed male who presents in follow-up for memory loss status post encephalopathic episode when he was hospitalized after heroin overdose.   This was complicated by acute respiratory failure, cardiac arrest, renal failure, and bilateral lower extremities compartment syndrome status post multiple surgeries. Prior to this he had history of recent diagnosis of bipolar disorder and history of chronic HCV. We discussed the results of testing. MRI brain was normal.  He is going to start taking an oral vitamin B12 supplement. We will resume speech therapy for cognitive therapy. He was in speech therapy but stopped since having COVID. Outpatient physical therapy for gait and balance. He did not have neuropsychological evaluation yet so will refer for neuropsychological evaluation at another clinic as this cannot be obtained here at this time. Bilateral lower extremities EMG/NCS. Gabapentin nightly for lower extremities neuropathic pain. Discussed medication, indication, side effects, and dosing. Patient verbalized understanding. Advised he can discuss with PCP regarding follow-up with vascular specialist.  Follow up in 3 months. Diagnoses and all orders for this visit:    1. Cognitive complaints  -     REFERRAL TO SPEECH THERAPY  -     REFERRAL TO NEUROPSYCHOLOGY    2. Gait abnormality  -     REFERRAL TO PHYSICAL THERAPY    3. Memory loss  -     REFERRAL TO SPEECH THERAPY  -     REFERRAL TO NEUROPSYCHOLOGY    4. Tingling  -     EMG TWO EXTREMITIES LOWER; Future  -     gabapentin (NEURONTIN) 300 mg capsule; Take 1 Capsule by mouth nightly. Max Daily Amount: 300 mg. Total time 35 minutes with 20 minutes spent in counseling. Signed By: Shereen Jeff NP        PLEASE NOTE:   Portions of this document may have been produced using voice recognition software. Unrecognized errors in transcription may be present.

## 2022-08-12 DIAGNOSIS — R20.2 TINGLING: ICD-10-CM

## 2022-08-30 ENCOUNTER — HOSPITAL ENCOUNTER (OUTPATIENT)
Dept: PHYSICAL THERAPY | Age: 42
Discharge: HOME | End: 2022-08-30
Payer: COMMERCIAL

## 2022-08-30 PROCEDURE — 97161 PT EVAL LOW COMPLEX 20 MIN: CPT

## 2022-08-30 NOTE — PROGRESS NOTES
PHYSICAL THERAPY - DAILY TREATMENT NOTE    Patient Name: Tod Hoffman        Date: 2022  : 1980    Patient  Verified: YES  Visit #:     Insurance: Payor: [id number] / Plan: [address] / Product Type: Managed Care Medicaid /      In time: 8:30 Out time: 9:05   Total Treatment Time: 35     Medicare Time Tracking (below)   Total Timed Codes (min):  - 1:1 Treatment Time:  -     TREATMENT AREA/ DIAGNOSIS = Other abnormalities of gait and mobility [R26.89]    SUBJECTIVE   Pain Level (on 0 to 10 scale):  6  / 10   Medication Changes/New allergies or changes in medical history, any new surgeries or procedures? NO    If yes, update Summary List   Subjective Functional Status/Changes:  []  No changes reported     See Eval      OBJECTIVE    Other Objective/Functional Measures:    See Eval   Post Treatment Pain Level (on 0 to 10) scale:   6  / 10     ASSESSMENT  Assessment/Changes in Function:     See eVal     []  See Progress Note/Recertification   Patient will continue to benefit from skilled PT services to modify and progress therapeutic interventions, address functional mobility deficits, address ROM deficits, address strength deficits, analyze and address soft tissue restrictions, and analyze and cue movement patterns to attain remaining goals.    Progress toward goals / Updated goals:    See Eval     PLAN  [x]  Upgrade activities as tolerated YES Continue plan of care   []  Discharge due to :    []  Other:      Therapist: Kevin Gibbs DPT     Date: 2022 Time: 9:31 AM        Future Appointments   Date Time Provider Myrna Taveras   2022 10:00 AM Kasi Bautista NP [facility] BS AMB       1
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
## 2022-09-08 ENCOUNTER — TELEPHONE (OUTPATIENT)
Dept: PHYSICAL THERAPY | Age: 42
End: 2022-09-08

## 2022-09-13 ENCOUNTER — HOSPITAL ENCOUNTER (OUTPATIENT)
Dept: PHYSICAL THERAPY | Age: 42
Discharge: HOME | End: 2022-09-13
Payer: MEDICAID

## 2022-09-13 PROCEDURE — 97112 NEUROMUSCULAR REEDUCATION: CPT

## 2022-09-13 PROCEDURE — 97110 THERAPEUTIC EXERCISES: CPT

## 2022-09-13 PROCEDURE — 97530 THERAPEUTIC ACTIVITIES: CPT

## 2022-09-13 NOTE — PROGRESS NOTES
PHYSICAL THERAPY - DAILY TREATMENT NOTE    Patient Name: Damaris Blackburn        Date: 2022  : 1980    Patient  Verified: YES  Visit #:   2   of   12  Insurance: Payor: Geovany Cronin / Plan: [employer] [place] [address] / Product Type: Managed Care Medicaid /      In time: 8:50 Out time: 9:40   Total Treatment Time: 50     Medicare Time Tracking (below)   Total Timed Codes (min):  - 1:1 Treatment Time:  -     TREATMENT AREA/ DIAGNOSIS = Other abnormalities of gait and mobility [R26.89]  Other symptoms and signs involving the musculoskeletal system [R29.898]    SUBJECTIVE   Pain Level (on 0 to 10 scale):  0  / 10   Medication Changes/New allergies or changes in medical history, any new surgeries or procedures? NO    If yes, update Summary List   Subjective Functional Status/Changes:  []  No changes reported     Pt reports still having difficulty with walking without AFOs. Pt showing improved static balance with AFOs on. Pt reports minimal pain. Still decreased overall functional endurance (gets tired easily still)      OBJECTIVE    15 min Therapeutic Exercise:  [x]  See flow sheet   Rationale:      increase strength and improve coordination to improve the patients ability to perform ADLs without pain       10 min Therapeutic Activity: [x]  See flow sheet   Rationale:     functional activities  to improve the patients ability to perform ADLs without pain    15 min Neuromuscular Re-ed: [x]  See flow sheet   Rationale:    improve balance and increase proprioception to improve the patients ability to perform ADLs without pain    Billed With/As:   [x] TE   [] TA   [] Neuro   [] Self Care Patient Education: [x] Review HEP    [] Progressed/Changed HEP based on:   [] positioning   [] body mechanics   [] transfers   [] heat/ice application    [] other:        Other Objective/Functional Measures:    Decreased static control without braces.    Post Treatment Pain Level (on 0 to 10) scale:   0  / 10 ASSESSMENT  Assessment/Changes in Function:     Pt showing decreased overall strength. Pt showing decreased medial lateral control in standing without braces     []  See Progress Note/Recertification   Patient will continue to benefit from skilled PT services to modify and progress therapeutic interventions, address functional mobility deficits, address ROM deficits, address strength deficits, analyze and address soft tissue restrictions, and analyze and cue movement patterns to attain remaining goals.    Progress toward goals / Updated goals:    Good Progress to    [] STG    [x] LTG  1 as shown by improved overall strength since onset of care     PLAN  [x]  Upgrade activities as tolerated YES Continue plan of care   []  Discharge due to :    []  Other:      Therapist: Austyn Hussein DPT     Date: 9/13/2022 Time: 9:40 AM        Future Appointments   Date Time Provider Myrna Taveras   9/15/2022 10:00 AM Vandanaelia Mcgregor, PTA [facility]   9/16/2022  9:00 AM Vandanaelia Mcgregor, PTA [facility]   9/20/2022  8:30 AM Vandana Mcgregor, PTA [facility]   9/23/2022  9:00 AM Vandana Mcgregor, PTA [facility]   9/27/2022  8:30 AM Vandana Mcgregor, PTA [facility]   9/29/2022 10:45 AM Vandana Mcgregor, PTA [facility]   9/30/2022  9:15 AM Mónica Ugaldetler [facility]   10/3/2022 10:10 AM Vandana Mcgregor, PTA [facility]   10/4/2022  8:45 AM Vandana Mcgregor, PTA [facility]   10/7/2022  8:50 AM Vandana Mcgregor, PTA [facility]   11/14/2022 10:00 AM Paris Bautista, NP [facility] AMB

## 2022-09-15 ENCOUNTER — APPOINTMENT (OUTPATIENT)
Dept: PHYSICAL THERAPY | Age: 42
End: 2022-09-15
Payer: MEDICAID

## 2022-09-16 ENCOUNTER — HOSPITAL ENCOUNTER (OUTPATIENT)
Dept: PHYSICAL THERAPY | Age: 42
End: 2022-09-16
Payer: MEDICAID

## 2022-09-23 NOTE — PROGRESS NOTES
-- DO NOT REPLY / DO NOT REPLY ALL --  -- Message is from Engagement Center Operations (ECO) --    Provider paged via [employer] Documentation - The below message was copied and pasted from a [employer] Private page:    Initiated Date/Time  9/23/2022 4:41 pm  Message Sent Date/Time  9/23/2022 4:43 pm  Source  [facility] Contact Center  Department  [facility]  Phone Number  [phone]  Method  Secure Text  Contacted  Nurse Triage, [facility]  Requested  Brian Mccormack MD  Details  Patient  Message  [phone] [facility] URGENT CALLER NAME: CLAYTON REQUESTED PHYSICIAN: BRIAN MCCORMACK RE: MIKAELA WILKINSON PATIENT 2021 MRN: [id number] PATIENT PCP: BRIAN MCCORMACK MOM IS CALLING BACK TO SPEAK WITH CLINICAL TEAM REGARDING PATIENT'S NG TUBE. PLEASE CONTACT AS SOON AS POSSIBLE. THANK YOU    Route encounter to 'Provider On-Call' clinical support pool that was paged. 'Sign and Close Workspace'   PHYSICAL THERAPY - DAILY TREATMENT NOTE    Patient Name: Federica Shin        Date: 2022  : 1980    Patient  Verified: YES  Visit #:      18  Insurance: Payor: Dc [id number] / Plan: [address] / Product Type: Managed Care Medicaid /      In time: 1:00 Out time: 1:40   Total Treatment Time: 40     Medicare Time Tracking (below)   Total Timed Codes (min):  - 1:1 Treatment Time:  -     TREATMENT AREA/ DIAGNOSIS = Other abnormalities of gait and mobility [R26.89]  Other symptoms and signs involving the musculoskeletal system [R29.898]    SUBJECTIVE   Pain Level (on 0 to 10 scale):  0  / 10   Medication Changes/New allergies or changes in medical history, any new surgeries or procedures? NO    If yes, update Summary List   Subjective Functional Status/Changes:  []  No changes reported     See Eval      OBJECTIVE        Other Objective/Functional Measures:    See Eval   Post Treatment Pain Level (on 0 to 10) scale:   3  / 10     ASSESSMENT  Assessment/Changes in Function:     See Eval     []  See Progress Note/Recertification   Patient will continue to benefit from skilled PT services to modify and progress therapeutic interventions, address functional mobility deficits, address ROM deficits, address strength deficits, analyze and address soft tissue restrictions and analyze and cue movement patterns to attain remaining goals.    Progress toward goals / Updated goals:    See Eval     PLAN  [x]  Upgrade activities as tolerated YES Continue plan of care   []  Discharge due to :    []  Other:      Therapist: Beuford Gaucher ,DPT     Date: 2022 Time: 2:47 PM        Future Appointments   Date Time Provider Myrna Taveras   2022  8:15 AM Sherrie Badger [facility]   2022  2:45 PM Marga Mendoza PTA [facility]   2022  9:15 AM Sherrie Badger [facility]   2022 10:00 AM Zuly Espinoza PT [facility]   2022  9:00 AM Zuly Espinoza PT [facility] 3/3/2022 11:15 AM Mary Limb Katrinafran Ariella, NP [facility] AMB   3/4/2022  9:00 AM Arnlucie Weems, PTA [facility]   3/7/2022  9:45 [facility]   3/10/2022  9:15 AM [facility]   3/11/2022  9:[facility]   3/17/2022  9:15 AM [facility]   3/18/2022  9:00 [facility]   3/21/2022  9:00 AM [facility]   3/24/2022  9:15 AM Arneta Norse, PTA [facility]   3/25/2022  9:00 [facility]   3/28/2022  9:00 AM [facility] Faustina, PTA [facility]   3/31/2022  9:15 AM [facility]   4/1/2022  9:00 AM [facility]

## 2022-09-27 ENCOUNTER — HOSPITAL ENCOUNTER (OUTPATIENT)
Dept: PHYSICAL THERAPY | Age: 42
Discharge: HOME | End: 2022-09-27
Payer: MEDICAID

## 2022-09-27 PROCEDURE — 97530 THERAPEUTIC ACTIVITIES: CPT

## 2022-09-27 PROCEDURE — 97110 THERAPEUTIC EXERCISES: CPT

## 2022-09-27 PROCEDURE — 97112 NEUROMUSCULAR REEDUCATION: CPT

## 2022-09-27 PROCEDURE — 97535 SELF CARE MNGMENT TRAINING: CPT

## 2022-09-27 NOTE — PROGRESS NOTES
PHYSICAL THERAPY - DAILY TREATMENT NOTE     Patient Name: Sonya Crook        Date: 2022  : 1980   YES Patient  Verified  Visit #:   3   of   12  Insurance: Payor: Larry Sulliv[place]ota / Plan: [employer] [facility] / Product Type: Managed Care Medicaid /      In time: 8:30 am Out time: 9:21    Total Treatment Time: 51     Medicare/BCBS Time Tracking (below)   Total Timed Codes (min):  - 1:1 Treatment Time:  -     TREATMENT AREA =  Other abnormalities of gait and mobility [R26.89]  Other symptoms and signs involving the musculoskeletal system [R29.898]    SUBJECTIVE    Pain Level (on 0 to 10 scale):  0  / 10   Medication Changes/New allergies or changes in medical history, any new surgeries or procedures? NO    If yes, update Summary List   Subjective Functional Status/Changes:  []  No changes reported     Pt reports he has not been here secondary to being ill.    No HEP         OBJECTIVE  Modalities Rationale:   n/a      15 min Therapeutic Exercise:  [x]  See flow sheet   Rationale:      increase ROM and increase strength to improve the patients ability to decrease fall risk     10 min Therapeutic Activity: [x]  See flow sheet   Rationale:      increase ROM, increase strength, improve coordination, and increase proprioception to improve the patients ability to decrease fall risk     16 min Neuromuscular Re-ed: [x]  See flow sheet   Rationale:      increase ROM, increase strength, improve coordination, and increase proprioception to improve the patients ability to decrease fall risk         10 min Billed With/As:   [] TE   [] TA   [] Neuro   [] Self Care Patient Education: [x] Review HEP , safety in self progression in home walking program    [] Progressed/Changed HEP based on:   [] positioning   [] body mechanics   [] transfers   [] heat/ice application    [] other:        Other Objective/Functional Measures:    Ex completed per flow sheet     Post Treatment Pain Level (on 0 to 10) scale:   0  / 10     ASSESSMENT    Assessment/Changes in Function:     Foam EC: ~ 6 seconds; EC feet apart ~ 8 seconds after 3-4 trials each ; pt tending to have LOB posteriorly with CGA for recovery. Add standing 3 way hip     []  See Progress Note/Recertification   Patient will continue to benefit from skilled PT services to modify and progress therapeutic interventions, address functional mobility deficits, address ROM deficits, address strength deficits, analyze and address soft tissue restrictions, analyze and cue movement patterns, analyze and modify body mechanics/ergonomics, address imbalance/dizziness, and instruct in home and community integration to attain remaining goals.       Progress toward goals / Updated goals:    Slow Progress toward all goals secondary to pt not seen between 8- to [id number] & 9-13-22 to 9-27-22       PLAN    [x]  Upgrade activities as tolerated YES Continue plan of care   []  Discharge due to :    []  Other:      Therapist: Oneal Stanley PTA    Date: 9/27/2022 Time:  9:21 AM     Future Appointments   Date Time Provider Myrna Taveras   9/29/2022 10:45 AM Lalitha Fuentes PTA [facility]   9/30/2022  9:15 AM Amalia Furlong [facility]   10/3/2022 10:10 AM Lalitha Fuentes PTA [facility]   10/4/2022  8:45 AM Lalitha Fuentes PTA [facility]   10/7/2022  8:50 AM Lalitha Fuentes PTA [facility]   11/14/2022 10:00 AM Trent Bautista, NP [facility] AMB

## 2022-09-29 ENCOUNTER — HOSPITAL ENCOUNTER (OUTPATIENT)
Dept: PHYSICAL THERAPY | Age: 42
Discharge: HOME | End: 2022-09-29
Payer: MEDICAID

## 2022-09-29 PROCEDURE — 97530 THERAPEUTIC ACTIVITIES: CPT

## 2022-09-29 PROCEDURE — 97112 NEUROMUSCULAR REEDUCATION: CPT

## 2022-09-29 PROCEDURE — 97110 THERAPEUTIC EXERCISES: CPT

## 2022-09-29 NOTE — PROGRESS NOTES
PHYSICAL THERAPY - DAILY TREATMENT NOTE     Patient Name: Alfredo Rolon        Date: 2022  : 1980   YES Patient  Verified  Visit #:   4   of   12  Insurance: Payor: Анна Araiza / Plan: [address] [place] [address] / Product Type: Managed Care Medicaid /      In time: 10:48  Out time: 11:26   Total Treatment Time: 38     Medicare/BCBS Time Tracking (below)   Total Timed Codes (min):  - 1:1 Treatment Time:  -     TREATMENT AREA =  Other abnormalities of gait and mobility [R26.89]  Other symptoms and signs involving the musculoskeletal system [R29.898]    SUBJECTIVE    Pain Level (on 0 to 10 scale):  0  / 10   Medication Changes/New allergies or changes in medical history, any new surgeries or procedures?     NO    If yes, update Summary List   Subjective Functional Status/Changes:  []  No changes reported     Pt reports he walks in house mostly without use of SPC         OBJECTIVE  Modalities Rationale:   n/a      12 min Therapeutic Exercise:  [x]  See flow sheet   Rationale:      increase ROM, increase strength, improve coordination, improve balance, and increase proprioception to improve the patients ability to decrease fall risk     8 min Therapeutic Activity: [x]  See flow sheet   Rationale:      increase ROM, increase strength, improve coordination, improve balance, and increase proprioception to improve the patients ability to decrease fall risk     18 min Neuromuscular Re-ed: [x]  See flow sheet   Rationale:      increase ROM, increase strength, improve coordination, improve balance, and increase proprioception to improve the patients ability to decrease fall risk       Billed With/As:   [] TE   [] TA   [] Neuro   [] Self Care Patient Education: [x] Review HEP    [] Progressed/Changed HEP based on:   [] positioning   [] body mechanics   [] transfers   [] heat/ice application    [] other:        Other Objective/Functional Measures:    Resume mini squat; EO: instep: L:10; R: 24     Post Treatment Pain Level (on 0 to 10) scale:   0  / 10     ASSESSMENT    Assessment/Changes in Function:     Frequent CGA for small balance corrections in static balance EC for safety  Moderate challenge in step taps to cone without UE assist  Mild quad fatigue after exercise  Discussed discharge planning     []  See Progress Note/Recertification   Patient will continue to benefit from skilled PT services to modify and progress therapeutic interventions, address functional mobility deficits, address ROM deficits, address strength deficits, analyze and address soft tissue restrictions, analyze and cue movement patterns, analyze and modify body mechanics/ergonomics, assess and modify postural abnormalities, address imbalance/dizziness, and instruct in home and community integration to attain remaining goals. Progress toward goals / Updated goals:    Good Progress to    [x] STG    [] LTG  1-2 Emphasized use of HEP and home walking program short distances with self monitoring of skin with use of braces.        PLAN    [x]  Upgrade activities as tolerated YES Continue plan of care   []  Discharge due to :    []  Other:      Therapist: Kinga Issa PTA    Date: 9/29/2022 Time: 11:26  PM     Future Appointments   Date Time Provider Myrna Taveras   10/4/2022  8:45 AM Demetra Cruz PTA [facility]   10/7/2022  8:50 AM Demetra Cruz PTA [facility]   11/14/2022 10:00 AM Ariadna Bautista NP [facility] AMB

## 2022-09-30 ENCOUNTER — APPOINTMENT (OUTPATIENT)
Dept: PHYSICAL THERAPY | Age: 42
End: 2022-09-30
Payer: MEDICAID

## 2022-10-03 ENCOUNTER — APPOINTMENT (OUTPATIENT)
Dept: PHYSICAL THERAPY | Age: 42
End: 2022-10-03
Payer: MEDICAID

## 2022-10-04 ENCOUNTER — HOSPITAL ENCOUNTER (OUTPATIENT)
Dept: PHYSICAL THERAPY | Age: 42
Discharge: HOME | End: 2022-10-04
Payer: MEDICAID

## 2022-10-04 PROCEDURE — 97112 NEUROMUSCULAR REEDUCATION: CPT

## 2022-10-04 PROCEDURE — 97530 THERAPEUTIC ACTIVITIES: CPT

## 2022-10-04 PROCEDURE — 97110 THERAPEUTIC EXERCISES: CPT

## 2022-10-04 NOTE — PROGRESS NOTES
PHYSICAL THERAPY - DAILY TREATMENT NOTE     Patient Name: Madhavi Sparks        Date: 10/4/2022  : 1980   YES Patient  Verified  Visit #:   5   of   12  Insurance: Payor: Miranda Augustine / Plan: Group [address] [place] [address] / Product Type: Managed Care Medicaid /      In time: 8:50  Out time: 9:48   Total Treatment Time: 58     Medicare/BCBS Time Tracking (below)   Total Timed Codes (min):  - 1:1 Treatment Time:  -     TREATMENT AREA =  Other abnormalities of gait and mobility [R26.89]  Other symptoms and signs involving the musculoskeletal system [R29.898]    SUBJECTIVE    Pain Level (on 0 to 10 scale):  0  / 10   Medication Changes/New allergies or changes in medical history, any new surgeries or procedures? NO    If yes, update Summary List   Subjective Functional Status/Changes:  []  No changes reported   Pt reports normal soreness         OBJECTIVE  Modalities Rationale: n/a        16 min Therapeutic Exercise:  [x]  See flow sheet   Rationale:      increase ROM, increase strength, improve coordination, improve balance, and increase proprioception to improve the patients ability to decrease fall risk         16 min Therapeutic Activity: [x]  See flow sheet   Rationale:      increase ROM, increase strength, improve coordination, improve balance, and increase proprioception to improve the patients ability to decrease fall risk    26/ 15 min billed min Neuromuscular Re-ed: [x]  See flow sheet   Rationale:      increase ROM, increase strength, improve coordination, improve balance, and increase proprioception to improve the patients ability to decrease fall risk         Billed With/As:   [] TE   [] TA   [] Neuro   [] Self Care Patient Education: [x] Review HEP    [] Progressed/Changed HEP based on:   [] positioning   [] body mechanics   [] transfers   [] heat/ice application    [] other:        Other Objective/Functional Measures:    Discussed discharge planning and slow return to gym as tolerated. Post Treatment Pain Level (on 0 to 10) scale:   0  / 10     ASSESSMENT    Assessment/Changes in Function:     Advanced LE strengthening per flow sheet without sx increase  Moderate challenge in full plank and prone alt UE/LE     []  See Progress Note/Recertification   Patient will continue to benefit from skilled PT services to modify and progress therapeutic interventions, address functional mobility deficits, address ROM deficits, address strength deficits, analyze and address soft tissue restrictions, analyze and cue movement patterns, analyze and modify body mechanics/ergonomics, assess and modify postural abnormalities, and instruct in home and community integration to attain remaining goals.       Progress toward goals / Updated goals:    Good Progress to    [x] STG    [] LTG  1 as shown by pt self report and as demonstrated in clinic       PLAN    [x]  Upgrade activities as tolerated YES Continue plan of care   []  Discharge due to :    []  Other:      Therapist: Toño Barger PTA    Date: 10/4/2022 Time: 9:48 AM     Future Appointments   Date Time Provider Myrna Taveras   10/7/2022  8:50 AM Gail Mora PTA [facility]   11/14/2022 10:00 AM Miguel Angel Bautista NP [facility] BS AMB

## 2022-10-07 ENCOUNTER — HOSPITAL ENCOUNTER (OUTPATIENT)
Dept: PHYSICAL THERAPY | Age: 42
Discharge: HOME | End: 2022-10-07
Payer: MEDICAID

## 2022-10-07 PROCEDURE — 97110 THERAPEUTIC EXERCISES: CPT

## 2022-10-07 PROCEDURE — 97530 THERAPEUTIC ACTIVITIES: CPT

## 2022-10-07 PROCEDURE — 97112 NEUROMUSCULAR REEDUCATION: CPT

## 2022-10-07 NOTE — PROGRESS NOTES
PHYSICAL THERAPY - DAILY TREATMENT NOTE     Patient Name: Rhoda Councilman        Date: 10/7/2022  : 1980   YES Patient  Verified  Visit #:      12  Insurance: Payor: Alivia Yates / Plan: [facility] / Product Type: Managed Care Medicaid /      In time: 8:45 am Out time: 9:30    Total Treatment Time: 45     Medicare/BCBS Time Tracking (below)   Total Timed Codes (min):  - 1:1 Treatment Time:  -     TREATMENT AREA =  Other abnormalities of gait and mobility [R26.89]  Other symptoms and signs involving the musculoskeletal system [R29.898]    SUBJECTIVE    Pain Level (on 0 to 10 scale):  0  / 10   Medication Changes/New allergies or changes in medical history, any new surgeries or procedures?     NO    If yes, update Summary List   Subjective Functional Status/Changes:  []  No changes reported     Pt reports no muscle soreness today         OBJECTIVE  Modalities Rationale:   n/a      16 min Therapeutic Exercise:  [x]  See flow sheet   Rationale:      increase ROM, increase strength, improve coordination, and increase proprioception to improve the patients ability to decrease fall risk     13 min Therapeutic Activity: [x]  See flow sheet   Rationale:      increase ROM, increase strength, improve coordination, and increase proprioception to improve the patients ability to decrease fall risk      16 min Neuromuscular Re-ed: [x]  See flow sheet   Rationale:      increase ROM, increase strength, improve coordination, and increase proprioception to improve the patients ability to decrease fall risk          Billed With/As:   [] TE   [] TA   [] Neuro   [] Self Care Patient Education: [x] Review HEP    [] Progressed/Changed HEP based on:   [] positioning   [] body mechanics   [] transfers   [] heat/ice application    [] other:        Other Objective/Functional Measures:    Intermittent UE assist for balance  recovery with cone taps , EC static balance  Resume exercise per flow sheet     Post Treatment Pain Level (on 0 to 10) scale:   0  / 10     ASSESSMENT    Assessment/Changes in Function:     Pt demonstrating improving independent recovery in static standing balance  EO     []  See Progress Note/Recertification   Patient will continue to benefit from skilled PT services to modify and progress therapeutic interventions, address functional mobility deficits, address ROM deficits, address strength deficits, analyze and address soft tissue restrictions, analyze and cue movement patterns, analyze and modify body mechanics/ergonomics, assess and modify postural abnormalities, address imbalance/dizziness, and instruct in home and community integration to attain remaining goals. Progress toward goals / Updated goals:    Good Progress to    [x] STG    1. Pt will be independent and compliant with HEP to decrease pain, increase ROM and return pt to PLOF.  - Goal partially met  2.  Pt will demonstrate a GROC score of >/= +2 to show overall improvement in function-NA       PLAN    [x]  Upgrade activities as tolerated YES Continue plan of care   []  Discharge due to :    []  Other:      Therapist: Margaret Bland PTA    Date: 10/7/2022 Time: 9:30  AM     Future Appointments   Date Time Provider Myrna Taveras   11/14/2022 10:00 AM Javier Bautista NP [facility] BS AMB

## 2022-10-11 ENCOUNTER — HOSPITAL ENCOUNTER (OUTPATIENT)
Dept: PHYSICAL THERAPY | Age: 42
Discharge: HOME | End: 2022-10-11
Payer: MEDICAID

## 2022-10-11 PROCEDURE — 97110 THERAPEUTIC EXERCISES: CPT

## 2022-10-11 PROCEDURE — 97530 THERAPEUTIC ACTIVITIES: CPT

## 2022-10-11 PROCEDURE — 97112 NEUROMUSCULAR REEDUCATION: CPT

## 2022-10-11 NOTE — PROGRESS NOTES
[address] [place] [address] PHYSICAL THERAPY AT [facility] [address], [address]  Phone: [phone]  Fax: [phone]  PROGRESS NOTE  Patient Name: Maneul Nieto : 1980   Treatment/Medical Diagnosis: Other abnormalities of gait and mobility [R26.89]  Other symptoms and signs involving the musculoskeletal system [R29.898]   Referral Source: Sami Noe MD     Date of Initial Visit: 2022 Attended Visits: 7 Missed Visits: -     SUMMARY OF TREATMENT  PT consisted of manual therapy techniques, therapeutic exercises, and modalities to improve strength, improve mobility, decrease pain, and improve overall function. CURRENT STATUS  Pt has been seen for 7 total visit during this episode of care. Pt still ambulating with B AFOs and single point cane. Pt still showing significant weakness in ankle dorsiflexors. Pt also showing decreased overall static balance and dynamic balance. Pt has gross 4+/5 LE strength with MMT with abduction at 4-/5. Goal/Measure of Progress Goal Met? 1. Increase score on FOTO to > or = to 60 points to demo an increase in functional activity tolerance with the LE. Status at last Eval: 42 Current Status: 29 no   2. Pt will note < or = 2/10 pain with all mobility to improve comfort with ADLs. Status at last Eval: 6 Current Status: 0 yes   3. Pt will demonstrate a GROC score of >/= +5 to show overall improvement in function   Status at last Eval: NA Current Status: +4 progressing     New Goals to be achieved in __4__  weeks:  1. Continue unmet goals above   2.  -   3.  -   4.  -       RECOMMENDATIONS  Pt to continue PT 2x a week for 4 weeks to continue to improved LE strength and balance   If you have any questions/comments please contact us directly at [phone]. Thank you for allowing us to assist in the care of your patient.     Therapist Signature: Muna Camilo Date: 10/11/2022     Time: 10:45 AM   NOTE TO PHYSICIAN:  PLEASE COMPLETE THE ORDERS BELOW AND FAX TO   [facility] [place]: [phone]. If you are unable to process this request in 24 hours please contact our office: [phone].    ___ I have read the above report and request that my patient continue as recommended.   ___ I have read the above report and request that my patient continue therapy with the following changes/special instructions:_________________________________________________________   ___ I have read the above report and request that my patient be discharged from therapy.      Physician Signature:        Date:       Time:        Pino Asif MD

## 2022-10-14 ENCOUNTER — APPOINTMENT (OUTPATIENT)
Dept: PHYSICAL THERAPY | Age: 42
End: 2022-10-14
Payer: MEDICAID

## 2022-10-18 ENCOUNTER — HOSPITAL ENCOUNTER (OUTPATIENT)
Dept: PHYSICAL THERAPY | Age: 42
Discharge: HOME | End: 2022-10-18
Payer: MEDICAID

## 2022-10-18 PROCEDURE — 97112 NEUROMUSCULAR REEDUCATION: CPT

## 2022-10-18 PROCEDURE — 97530 THERAPEUTIC ACTIVITIES: CPT

## 2022-10-18 PROCEDURE — 97110 THERAPEUTIC EXERCISES: CPT

## 2022-10-18 NOTE — PROGRESS NOTES
PHYSICAL THERAPY - DAILY TREATMENT NOTE    Patient Name: Felicita Bourgeois        Date: 10/18/2022  : 1980    Patient  Verified: YES  Visit #:   8   of   12  Insurance: Payor: Florence Lewistein / Plan: [employer] [facility] / Product Type: Managed Care Medicaid /      In time: 9:00 Out time: 9:45   Total Treatment Time: 45     Medicare Time Tracking (below)   Total Timed Codes (min):  - 1:1 Treatment Time:  -     TREATMENT AREA/ DIAGNOSIS = Other abnormalities of gait and mobility [R26.89]  Other symptoms and signs involving the musculoskeletal system [R29.898]    SUBJECTIVE   Pain Level (on 0 to 10 scale):  0  / 10   Medication Changes/New allergies or changes in medical history, any new surgeries or procedures? NO    If yes, update Summary List   Subjective Functional Status/Changes:  []  No changes reported     Pt reports still having difficulty with balancing. Pt states he is still using the braces at all times.       OBJECTIVE    15 min Therapeutic Exercise:  [x]  See flow sheet   Rationale:      increase ROM and increase strength to improve the patients ability to perform ADLs without pain     15 min Therapeutic Activity: [x]  See flow sheet   Rationale:     functional activities  to improve the patients ability to perform ADLs without pain    15 min Neuromuscular Re-ed: [x]  See flow sheet   Rationale:    improve coordination, improve balance, and increase proprioception to improve the patients ability to perform ADLs without pain    Billed With/As:   [x] TE   [] TA   [] Neuro   [] Self Care Patient Education: [x] Review HEP    [] Progressed/Changed HEP based on:   [] positioning   [] body mechanics   [] transfers   [] heat/ice application    [] other:        Other Objective/Functional Measures:    Pt showing decreased ankle stability   Post Treatment Pain Level (on 0 to 10) scale:   0  / 10     ASSESSMENT  Assessment/Changes in Function:     Pt showing improved overall functional strength []  See Progress Note/Recertification   Patient will continue to benefit from skilled PT services to modify and progress therapeutic interventions, address functional mobility deficits, address ROM deficits, address strength deficits, analyze and address soft tissue restrictions, and analyze and cue movement patterns to attain remaining goals.    Progress toward goals / Updated goals:    Good Progress to    [] STG    [x] LTG  1 as shown by improved strength needed for ADL     PLAN  [x]  Upgrade activities as tolerated YES Continue plan of care   []  Discharge due to :    []  Other:      Therapist: Roberto Ocasio DPT     Date: 10/18/2022 Time: 11:54 AM        Future Appointments   Date Time Provider Myrna Taveras   10/21/2022  8:40 AM Christie Lynn PTA [facility]   10/25/2022  9:00 AM jJ Linder [facility]   10/28/2022  8:50 AM Christie Lynn PTA [facility]   11/14/2022 10:00 AM Bhavesh Bautista, NP [facility] BS AMB

## 2022-10-25 ENCOUNTER — HOSPITAL ENCOUNTER (OUTPATIENT)
Dept: PHYSICAL THERAPY | Age: 42
Discharge: HOME | End: 2022-10-25
Payer: MEDICAID

## 2022-10-25 PROCEDURE — 97112 NEUROMUSCULAR REEDUCATION: CPT

## 2022-10-25 PROCEDURE — 97110 THERAPEUTIC EXERCISES: CPT

## 2022-10-25 PROCEDURE — 97530 THERAPEUTIC ACTIVITIES: CPT

## 2022-10-26 DIAGNOSIS — R41.3 MEMORY LOSS: ICD-10-CM

## 2022-10-26 DIAGNOSIS — R41.9 COGNITIVE COMPLAINTS: Primary | ICD-10-CM

## 2022-10-28 ENCOUNTER — HOSPITAL ENCOUNTER (OUTPATIENT)
Dept: PHYSICAL THERAPY | Age: 42
Discharge: HOME | End: 2022-10-28
Payer: MEDICAID

## 2022-10-28 PROCEDURE — 97110 THERAPEUTIC EXERCISES: CPT

## 2022-10-28 PROCEDURE — 97530 THERAPEUTIC ACTIVITIES: CPT

## 2022-10-28 PROCEDURE — 97112 NEUROMUSCULAR REEDUCATION: CPT

## 2022-10-28 NOTE — PROGRESS NOTES
PHYSICAL THERAPY - DAILY TREATMENT NOTE     Patient Name: Brent Nearing        Date: 10/28/2022  : 1980   YES Patient  Verified  Visit #:   10   of   12  Insurance: Payor: Sterling Pawan / Plan: [employer] [place] [address] / Product Type: Managed Care Medicaid /      In time: 8:53  Out time: 9:55    Total Treatment Time: 62     Medicare/BCBS Time Tracking (below)   Total Timed Codes (min):  - 1:1 Treatment Time: -     TREATMENT AREA =  Other abnormalities of gait and mobility [R26.89]  Other symptoms and signs involving the musculoskeletal system [R29.898]    SUBJECTIVE    Pain Level (on 0 to 10 scale):  0  / 10   Medication Changes/New allergies or changes in medical history, any new surgeries or procedures?     NO    If yes, update Summary List   Subjective Functional Status/Changes:  []  No changes reported     Doing OK; left ankle edema and intermittent rolling          OBJECTIVE  Modalities Rationale:   n/a        26/13 min Therapeutic Exercise:  [x]  See flow sheet   Rationale:      increase ROM, increase strength, improve coordination, and increase proprioception to improve the patients ability to perform ADLs     12 min Therapeutic Activity: [x]  See flow sheet   Rationale:      increase ROM, increase strength, improve coordination, and increase proprioception to improve the patients ability to decrease fall risk     24/10 min min Neuromuscular Re-ed: [x]  See flow sheet   Rationale:      increase ROM, increase strength, improve coordination, improve balance, and increase proprioception to improve the patients ability to perform functional ADLs       Billed With/As:   [] TE   [] TA   [] Neuro   [] Self Care Patient Education: [x] Review HEP    [] Progressed/Changed HEP based on:   [] positioning   [] body mechanics   [] transfers   [] heat/ice application    [] other:        Other Objective/Functional Measures:    Advanced core strengthening     Post Treatment Pain Level (on 0 to 10) scale:   0  / 10     ASSESSMENT    Assessment/Changes in Function:     EC foam feet apart ~ 11 seconds. Pt demonstrating improving use of hip and ankle balance reactions in static balance EO/EC. Close S for all EC exercise with frequent use of UE for support      []  See Progress Note/Recertification   Patient will continue to benefit from skilled PT services to modify and progress therapeutic interventions, address functional mobility deficits, address ROM deficits, address strength deficits, analyze and address soft tissue restrictions, analyze and cue movement patterns, analyze and modify body mechanics/ergonomics, assess and modify postural abnormalities, address imbalance/dizziness, and instruct in home and community integration to attain remaining goals.       Progress toward goals / Updated goals:    Good Progress to    [] STG    [x] LTG  1 as shown by review HEP, improving static balance EO and EC with close SBA       PLAN    [x]  Upgrade activities as tolerated YES Continue plan of care   []  Discharge due to :    []  Other:      Therapist: Jie Tee PTA    Date: 10/28/2022 Time: 9:55 AM     Future Appointments   Date Time Provider Myrna Taveras   11/1/2022  8:45 AM Christie Lynn PTA [facility]   11/4/2022  8:50 AM Christie Lynn PTA [facility]   11/14/2022 10:00 AM Bhavesh Bautista NP [facility] AMB

## 2022-11-01 ENCOUNTER — HOSPITAL ENCOUNTER (OUTPATIENT)
Dept: PHYSICAL THERAPY | Age: 42
Discharge: HOME | End: 2022-11-01
Payer: COMMERCIAL

## 2022-11-01 PROCEDURE — 97112 NEUROMUSCULAR REEDUCATION: CPT

## 2022-11-01 PROCEDURE — 97110 THERAPEUTIC EXERCISES: CPT

## 2022-11-01 PROCEDURE — 97535 SELF CARE MNGMENT TRAINING: CPT

## 2022-11-01 PROCEDURE — 97530 THERAPEUTIC ACTIVITIES: CPT

## 2022-11-01 NOTE — PROGRESS NOTES
PHYSICAL THERAPY - DAILY TREATMENT NOTE     Patient Name: Garrick Canada        Date: 2022  : 1980   YES Patient  Verified  Visit #:     Insurance: Payor: Megan Bradshaw / Plan: [id number] | [employer] [place] [address] / Product Type: Managed Care Medicaid /      In time: 8:55 am Out time: 9:57   Total Treatment Time: 62     Medicare/BCBS Time Tracking (below)   Total Timed Codes (min):  - 1:1 Treatment Time:  -     TREATMENT AREA =  Other abnormalities of gait and mobility [R26.89]  Other symptoms and signs involving the musculoskeletal system [R29.898]    SUBJECTIVE    Pain Level (on 0 to 10 scale):  0  / 10   Medication Changes/New allergies or changes in medical history, any new surgeries or procedures?     NO    If yes, update Summary List   Subjective Functional Status/Changes:  []  No changes reported     Pt reports walking at home; performing HEP         OBJECTIVE  Modalities Rationale:  n/a       19 min Therapeutic Exercise:  [x]  See flow sheet   Rationale:      increase ROM, increase strength, improve coordination, and increase proprioception to improve the patients ability to perform self care     15 min Therapeutic Activity: [x]  See flow sheet   Rationale:      increase ROM, increase strength, improve coordination, and increase proprioception to improve the patients ability to perform self care     18 min Neuromuscular Re-ed: [x]  See flow sheet   Rationale:      increase ROM, increase strength, improve coordination, improve balance, and increase proprioception to improve the patients ability to perform self care           10 min Billed With/As:   [x] TE   [x] TA   [] Neuro   [] Self Care Patient Education: [x] Review HEP, slow progression of home walking program    [] Progressed/Changed HEP based on:   [] positioning   [] body mechanics   [] transfers   [] heat/ice application    [] other:        Other Objective/Functional Measures:    Discussed discharge planning; self progression of HEP     Post Treatment Pain Level (on 0 to 10) scale:   0  / 10     ASSESSMENT    Assessment/Changes in Function:     Advanced LE strengthening and core stabilization per flow sheet; moderate challenge in single leg squats on TG;      []  See Progress Note/Recertification   Patient will continue to benefit from skilled PT services to modify and progress therapeutic interventions, address functional mobility deficits, address ROM deficits, address strength deficits, analyze and address soft tissue restrictions, analyze and cue movement patterns, assess and modify postural abnormalities, and instruct in home and community integration to attain remaining goals.       Progress toward goals / Updated goals:    Good Progress to    [] STG    [x] LTG  1-3 as shown by improving stability in recovery for LOB posteriorly during static balance ex       PLAN    [x]  Upgrade activities as tolerated YES Continue plan of care   []  Discharge due to :    []  Other:      Therapist: Jie Tee PTA    Date: 11/1/2022 Time: 9:57 AM     Future Appointments   Date Time Provider Myrna Taveras   11/2/2022 10:15 AM Ramonita 56 [facility]   11/8/2022  9:45 AM Jj [facility]   11/14/2022 10:00 AM Bhavesh Bautista, NP [facility] BS AMB

## 2022-11-02 ENCOUNTER — HOSPITAL ENCOUNTER (OUTPATIENT)
Dept: PHYSICAL THERAPY | Age: 42
Discharge: HOME | End: 2022-11-02
Payer: COMMERCIAL

## 2022-11-02 PROCEDURE — 92523 SPEECH SOUND LANG COMPREHEN: CPT

## 2022-11-02 NOTE — PROGRESS NOTES
ST DAILY TREATMENT NOTE    Patient Name: Jeb Moralez  Date:2022  : 1980  [x]  Patient  Verified  Payor: [facility] MEDICAID / Plan: [address] / Product Type: Managed Care Medicaid /   In time: 10:26  Out time: 11: 10  Total Treatment Time (min): 44 minutes  Visit #: 1 of 8   *PN due 22   SUBJECTIVE  Pain Level (0-10 scale): 0  Any medication changes, allergies to medications, adverse drug reactions, diagnosis change, or new procedure performed?: [x] No    [] Yes (see summary sheet for update)  Subjective functional status/changes:   [] No changes reported   This 37 y/o male was referred to ST this date for d/t cognitive communication deficit he sustained s/p drug overdose and suicide attempt with complications of acute hypoxic respiratory failure in 2021. Pt previously was attending OP ST and was awaiting insurance authorization. He then came down with COVID-19 and abdicated. When he eventually wanted to return to tx, insurance Tar[employer] was not given d/t, insurance claiming they never received auth, however, our office had a confirmation fax of the Tarari being sent. A new order was obtained by pt's neurologist and he presents today for re-evaluation in order to continue cognitive re-training/rehabilitation. Since last seen pt reports still having difficulty with memory and executive functions. However, he reports he was able to follow along when watching a TV series. He explained he could remember previously what happened without having to go back. He endorses he is not able to be his own advocate with MDs, insurance companies, and  court and he'd like to become more independent with these ADLS. Date of Onset: 2021  Social History: Pt is single, resides with mother, previous /mariana, likes to exercise, likes to learn and read, video games; currently unemployed and applying disability.   Prior Functional level: Pt reports all areas of speech/language, hearing, voice, swallowing, were within normal limits. OBJECTIVE    OUTPATIENT SPEECH-LANGUAGE EVALUATION  The Brief Cognitive Assessment Tool (BCAT) was administered. The instrument assesses orientation, verbal recall, visual recognition, visual recall, attention, abstraction, language, executive functions, visuospacial processing in adults and older adult population. Results of the sub-tests: scored as correct/total possible. Orientation: Patient was oriented to person, month, day of week, city, state, and situation:   Immediate verbal memory: 4/4 words; delayed verbal memory: 0/4 words    Visual recognition/naming 3 common objects: 3/3; delayed visual recall 0/3   Attention: tap when he heard a certain letter called out:   Mental control: count backwards 20-1:  Days of week backwards:   digits forward ; digits backwards:   Abstraction: tell the similarities: 3/3   Language: repeat a sentence   Fluency: list girls names:   Executive: cognitive shiftin/2  Arithmetic reasonin/1   Judgement: (how much time to a lot to get to an appointment): 0   Visuospatial; design:  Clock   Immediate story recall:  facts recalled:  Delayed story recall:   Story recognition:     Patient attained a score of 36 points out of a total of 50, indicating moderate cognitive deficits; specifically in the areas of immediate memory, delayed memory, attention, executive functioning, and problem solving. When he was last assessed during previous course of tx he obtained a 33/50 on the BCAT (he has since demo'd slow gains).     Receptive Language:  Receptive vocabulary    [x] WNL    [] Impaired [] Mild [] Mod [] Severe  Reliability of yes/no responses to questions [x] WNL    [] Impaired [] Mild [] Mod [] Severe   Reliability of responses to complex ideation [x] WNL    [] Impaired [] Mild [] Mod [] Severe  Auditory retention/processing   [] WNL    [x] Impaired [] Mild [x] Mod [] Severe  Follow commands [] 1 Step [] 2 Step [x] 3 Step [] complex    Objective Information:    Expressive Language  Automatic speech   [x] WNL    [] Impaired [] Mild [] Mod [] Severe  Able to identify objects/pictures  [x] WNL    [] Impaired [] Mild [] Mod [] Severe  Preservations    [] WNL    [x] Impaired [x] Mild [] Mod [] Severe  Word retrieval    [] WNL    [x] Impaired [x] Mild [] Mod [] Severe  Paraphasias   [x]None[] Literal    [] Phenomic   [] Jargon   [] Semantic  Able to make needs known at level [] Word   [] Phrase   [x] Sentence   [x] Gesture        [] Unable   Objective Information:    Writing: [] L or [x] R [x] WNL    [] Impaired @ [] Word [] Sentence [] Paragraph    Reading:  [x] WNL    [] Impaired @ [] Word [] Sentence [] Paragraph However his comprehension is impaired moderately    Cognition:  Attention: [x] Alert    [] Drowsy  Orientation: [x] Person   [x] Place    [] Time  Memory: [] WNL    [x] Impaired ST   [] Impaired LT   Comments:    Executive Functions:  Problem Solving: [x] WNL     [] Impaired [] Mild [] Mod [] Severe  Neglect:  [x] None    [] Left   [] Right  Self-regulation  [x] Appropriate   [] Impulsive   [] Requires verbal cues  Awareness:  [] Poor initiation   [] Disinhibition   [] Constant supervision        Speech:  Oral Verbal Apraxia: [x] None    [] Mild    [] Moderate    [] Severe   Dysarthria:  [x] None    [] Mild    [] Moderate    [] Severe   Intelligibility:  [] WNL    [] Words   [] Phrases   [] Sentences   [x] Conversation      100% Intelligible  Voice:   [x] WNL    [] Hoarse   [] Breathy   Comments:  Fluency:  [x] WNL    [] Dysfluent:      Patient/Caregiver instruction/education: [x] Review HEP    [] Progressed/Changed    HEP/Handouts given:  n/a    Pain Level (0-10 scale) post treatment: 0    ASSESSMENT  [x]  See Plan of Care    PLAN  [x]  Upgrade activities as tolerated     [x]  Continue plan of care  []  Discharge due to:__  [] Other:     Mansfield Fix, SLP 11/2/2022  10:58 AM  MA, CCC-SLP  Speech-Language Pathologist

## 2022-11-02 NOTE — PROGRESS NOTES
[facility] - [facility]   [facility], [address] [facility] [address]   [facility], [address] [place] [address]  [phone] phone  [phone] fax    Plan of Care/ Statement of Necessity for Speech Therapy Services    Patient name: Betsey Zuñiga Start of Care: 2022   Referral source: Neema Anaya NP : 1980    Medical Diagnosis: Unspecified symptoms and signs involving cognitive functions and awareness [R41.9]   Onset Date:~2021    Treatment Diagnosis: R41.841   Prior Hospitalization: see medical history Provider#: [id number]   Medications: Verified on Patient summary List    Comorbidities: depression, alcohol use, intravenous drug use in remission, tobacco use, chronic hepatitis C, visually impaired, scoliosis     Prior Level of Function: Pt was previously receiving skilled ST up until  for a moderate cognitive impairment. He came down with COVID-19, and auth from insurance was unable to be obtained (see below)    The Plan of Care and following information is based on the information from the initial evaluation. Assessment/ key information: This 37 y/o male was referred to [facility] [address] this date for d/t cognitive communication deficit he sustained s/p drug overdose and suicide attempt with complications of acute hypoxic respiratory failure in 2021. Pt previously was attending OP ST and was awaiting insurance authorization. He then came down with COVID-19 and abdicated. When he eventually wanted to return to tx, insurance [place] was not given d/t, insurance claiming they never received auth, however, our office had a confirmation fax of the [place] being sent. A new order was obtained by pt's neurologist and he presents today for re-evaluation in order to continue cognitive re-training/rehabilitation. Since last seen pt reports still having difficulty with memory and executive functions. However, he reports he was able to follow along when watching a TV series.  He explained he could remember previously what happened without having to go back. He endorses he is not able to be his own advocate with MDs, insurance companies, and  court and he'd like to become more independent with these ADLS. OBJECTIVE     OUTPATIENT SPEECH-LANGUAGE EVALUATION  The Brief Cognitive Assessment Tool (BCAT) was administered. The instrument assesses orientation, verbal recall, visual recognition, visual recall, attention, abstraction, language, executive functions, visuospacial processing in adults and older adult population. Results of the sub-tests: scored as correct/total possible. Orientation: Patient was oriented to person, month, day of week, city, state, and situation:   Immediate verbal memory: 4/4 words; delayed verbal memory: 0/4 words    Visual recognition/naming 3 common objects: 3/3; delayed visual recall 0/3   Attention: tap when he heard a certain letter called out:   Mental control: count backwards 20-1:  Days of week backwards:   digits forward ; digits backwards:   Abstraction: tell the similarities: 3/3   Language: repeat a sentence   Fluency: list girls names:   Executive: cognitive shiftin/2  Arithmetic reasonin/1   Judgement: (how much time to a lot to get to an appointment): 0   Visuospatial; design:  Clock   Immediate story recall:  facts recalled:  Delayed story recall:   Story recognition:      Patient attained a score of 36 points out of a total of 50, indicating moderate cognitive deficits; specifically in the areas of immediate memory, delayed memory, attention, executive functioning, and problem solving. When he was last assessed during previous course of tx he obtained a 33/50 on the BCAT (he has since demo'd slow gains).      Receptive Language:  Receptive vocabulary                                     [x] WNL    [] Impaired [] Mild [] Mod [] Severe  Reliability of yes/no responses to questions  [x] WNL    [] Impaired [] Mild [] Mod [] Severe   Reliability of responses to complex ideation  [x] WNL    [] Impaired [] Mild [] Mod [] Severe  Auditory retention/processing                         [] WNL    [x] Impaired [] Mild [x] Mod [] Severe  Follow commands       [] 1 Step         [] 2 Step         [x] 3 Step         [] complex    Objective Information:     Expressive Language  Automatic speech                               [x] WNL    [] Impaired [] Mild [] Mod [] Severe  Able to identify objects/pictures                      [x] WNL    [] Impaired [] Mild [] Mod [] Severe  Preservations                                      [] WNL    [x] Impaired [x] Mild [] Mod [] Severe  Word retrieval                                      [] WNL    [x] Impaired [x] Mild [] Mod [] Severe  Paraphasias                            [x]None[] Literal    [] Phenomic   [] Jargon   [] Semantic  Able to make needs known at level    [] Word   [] Phrase   [x] Sentence   [x] Gesture                                                               [] Unable          Objective Information:    Problem List:    []aphasic  []dysarthric  []dysphagic       []alexic  []agraphic  []dysphonia       []dysfluency   [x]Cognitive-Linguistic Disorder       []other   Treatment Plan may include any combination of the following: Cognitive/Language Treatment and Patient Education          Patient / Family readiness to learn indicated by: asking questions, trying to perform skills and interest    Persons(s) to be included in education:   patient (P); family support person (FSP: Darrin Garcia (mother)    Barriers to Learning/Limitations: Speech/Language/ cognitive/physical    Patient Goal (s): become more independent, advocate for myself, and come up with a plan for my future\"    Patient Self Reported Health Status: fair-->poor    Rehabilitation Potential: good    Short Term Goals: To be accomplished in 4 weeks   1.  Pt will recall x3/4 memory strategies and apply them to delayed recall tasks (3-4 words, 3-4 digits, picture retention, and simple novel information) given a 3-5 minute delay with 80% acc given Felipe to improve short term memory warranted for safety, QOL, and independence. 2. Pt will complete sustained/alternating attention tasks up to 3 minutes with 80% acc given Felipe/redirection to task with use of comp strategies in order to safely complete IADLS and increase communication competence. 3. Pt will complete functional problem solving tasks with 80% acc given min-modA. to enhance communication competence warranted for independent completion of ADLS. 4. Pt will complete functional and simulated cognitive communication ADL tasks with 80% acc given modA/redirection in order to increase executive function skills and independence. 5. Pt will particpate in further diagnostic tx to establish new baseline and most appropriate POC. Long Term Goals: To be accomplished in 4 weeks   1) Pt will develop improved functional cognitive-linguistic based communication skills with use of comp strategies to communicate wants/ needs effectively, maintain safety, independence and participate ADLS with 26% acc with min cues as supported by BCAT reassessment. Frequency / Duration: Patient to be seen 2 times per week for 4 weeks. Patient/ Caregiver education and instruction: Diagnosis, prognosis, and mutually established goals for POC. Certification Period: 11/2/22--11/30/22    KAYLA Patel 11/2/2022 12:29 PM  Speech-Language Pathologist    ______________________________________________________________________    I certify that the above Physical Therapy Services are being furnished while the patient is under my care. I agree with the treatment plan and certify that this therapy is necessary.     Physician Signature:                                                             Date:                                     Time: Cuate Lock NP        Please sign and return to [facility] - [facility].  [facility], [address] [place] [address] [facility] [phone] phone  [phone] fax     Thank you

## 2022-11-02 NOTE — PROGRESS NOTES
[facility] - [facility]   [facility], [address] [facility] [address]   [place], [address] [facility]  [phone] phone  [phone] fax    Speech Therapy Discharge Summary-Medicaid    Patient name: Dada Causey Start of Care: 3/23/22   Referral source: Maurice Mackenzie NP : 1980   Medical/Treatment Diagnosis: Unspecified symptoms and signs involving cognitive functions and awareness [R41.9] Onset Date: 2021     Prior Hospitalization: see medical history Provider#: [id number]   Medications: Verified on Patient Summary List    Comorbidities: depression, alcohol use, intravenous drug use in remission, tobacco use, chronic hepatitis C, visually impaired, scoliosis     Prior Level of Function: Pt reports all areas of speech/language, hearing, voice, swallowing, were within normal limits    Visits from Start of Care: 14    Missed Visits: 8    Reporting Period  22 to 22    * (Pt not seen from 22-22 d/t being on hold for insurance auth)       Summary of Care:  Goal:1. Pt will recall x4/4 memory strategies and apply them to delayed recall tasks (3-4 words, picture retention, simple novel information) presented visually or verbally given a x3-5 minute delay with 80% acc given Felipe to improve short term memory skills for safety, QOL, ADLs, and dignity. Status at last note/certification: Pt able to recall x4 comp strategies using \"WRAP\". Recalled x 3 unrelated words using repetition/picture it: pt chose to write it down  with 5 min delay: 66% acc given modA for set up of comp strategy; repeated again with use of repetition: given a 5 min delay recalled with 33% acc given Felipe- regressed while being on hold for insurance auth/ modify goal   Status at discharge: unable to assess. Unable to obtain insurance auth and pt abdicated. Goal:2.  Pt will immediately recall various functional sentences, voicemails, and therapeutic activities using compensatory strategies with 80% acc given modA to improve immediate memory skills for safety, QOL, ADLs and dignity. Status at last note/certification: 96% acc CARY and increasing to 91% acc given modA/ x2 reps- -progressing/continue addressing  Status at discharge: unable to assess. Unable to obtain insurance auth and pt abdicated. Goal:3. Pt will complete mental flexibility/alternating attention activities within 3 minutes with 90% acc given Felipe to improve attention for activites of choice, ADLs, and safety. Status at last note/certification:  (verbally) 3 words sequencin% acc CARY and increasing to 100% acc given Felipe; increased to 4 words: 43% acc CARY and increasing to 100% acc given mod cues/reps -progressing/continue addressing    Status at discharge: unable to assess. Unable to obtain insurance auth and pt abdicated. Goal: 4. Pt will utilize a preferred type of planner to aid in development of a routine, schedule, plan ahead, and recall recent events x1 week to reduce frustration and to increase independence with daily executive functions with min-modA. Status at last note/certification: Pt presents this date with a planner. SLP provided set up for easy finding month and weekly section. Pt educated re: placing appointments in planner; completed with upcoming ST appts. Additionally, pt advised to write homework assignment on today's day in week view. HEP place within planner.- slowly progressing/ modify goal   Status at discharge: unable to assess. Unable to obtain insurance auth and pt abdicated. Goal: 5. Pt will complete various arthmitic/logical problem solving tasks with 85% acc given Felipe. Status at last note/certification: (mathmetical/money management): 60% acc CARY and increasing to 80% acc given min-modA -progressing/continue addressing   Status at discharge: unable to assess. Unable to obtain insurance auth and pt abdicated.        ASSESSMENT: Pt was awaiting insurance authorization when he came down with COVID 19 and was placed on hold. He abdicated. When he was able to return to tx, insurance auth was unable to be obtained. D/C and rec re'eval in order to establish new POC and continue skilled ST for cognitive re-training. RECOMMENDATIONS:  [x]Discontinue therapy: []Patient has reached or is progressing toward set goals      [x]Patient is non-compliant or has abdicated      []Due to lack of appreciable progress towards set goals  [x]Insurance     KAYLA Bradley 11/2/2022 11:15 AM    NOTE TO PHYSICIAN:  Please complete the following and fax to: [facility] at [facility] at [phone]    Retain this original for your records. If you are unable to process this request in 24 hours, please contact our office.      [] I have read the above report and request that my patient continue therapy with the following changes/special instructions:  [] I have read the above report and request that my patient be discharged from therapy    Physician's Signature:_____________________ Date:___________Time:__________

## 2022-11-03 ENCOUNTER — TELEPHONE (OUTPATIENT)
Dept: PHYSICAL THERAPY | Age: 42
End: 2022-11-03

## 2022-11-04 ENCOUNTER — APPOINTMENT (OUTPATIENT)
Dept: PHYSICAL THERAPY | Age: 42
End: 2022-11-04
Payer: COMMERCIAL

## 2022-11-18 ENCOUNTER — TELEPHONE (OUTPATIENT)
Dept: PHYSICAL THERAPY | Age: 42
End: 2022-11-18

## 2022-11-21 ENCOUNTER — APPOINTMENT (OUTPATIENT)
Dept: PHYSICAL THERAPY | Age: 42
End: 2022-11-21
Payer: COMMERCIAL

## 2022-11-23 ENCOUNTER — TELEPHONE (OUTPATIENT)
Dept: PHYSICAL THERAPY | Age: 42
End: 2022-11-23

## 2022-11-23 ENCOUNTER — APPOINTMENT (OUTPATIENT)
Dept: PHYSICAL THERAPY | Age: 42
End: 2022-11-23
Payer: COMMERCIAL

## 2022-11-30 ENCOUNTER — APPOINTMENT (OUTPATIENT)
Dept: PHYSICAL THERAPY | Age: 42
End: 2022-11-30
Payer: COMMERCIAL

## 2022-12-05 ENCOUNTER — HOSPITAL ENCOUNTER (OUTPATIENT)
Dept: PHYSICAL THERAPY | Age: 42
Discharge: HOME | End: 2022-12-05
Payer: COMMERCIAL

## 2022-12-05 PROCEDURE — 92507 TX SP LANG VOICE COMM INDIV: CPT

## 2022-12-05 NOTE — PROGRESS NOTES
[facility]  [address] [place] [address], [address] [place] [address] , [address] [place] [address]  Phone: [phone]             Fax: [phone]     Speech Therapy Physician Update  [x] Progress Note  [] Discharge Summary  Patient name: Shelbie Wharton Start of Care: 22   Referral source: Adams Ramirez NP : 1980   Medical/Treatment Diagnosis: Unspecified symptoms and signs involving cognitive functions and awareness [R41.9]  Payor: WilDAVIDsTEAbetsey 22 / Plan: [address] / Product Type: Managed Care Medicaid /  Onset Date: ~ 2021     Prior Hospitalization: see medical history Provider#: [id number]   Medications: Verified on Patient Summary List    Comorbidities: depression, alcohol use, intravenous drug use in remission, tobacco use, chronic hepatitis C, visually impaired, scoliosis     Prior Level of Function: Pt was previously receiving skilled ST up until  for a moderate cognitive impairment. He came down with COVID-19, and auth from insurance was unable to be obtained (see below)     Visits from Start of Care: 1    Missed Visits: 2    Status at Evaluation/Last Progress Note (22): This 37 y/o male was referred to [address] this date for d/t cognitive communication deficit he sustained s/p drug overdose and suicide attempt with complications of acute hypoxic respiratory failure in 2021. Pt previously was attending OP ST and was awaiting insurance authorization. He then came down with COVID-19 and abdicated. When he eventually wanted to return to tx, insurance Prisca Raymond was not given d/t, insurance claiming they never received auth, however, our office had a confirmation fax of the Suzzanne Exon being sent. A new order was obtained by pt's neurologist and he presents today for re-evaluation in order to continue cognitive re-training/rehabilitation. Since last seen pt reports still having difficulty with memory and executive functions.  However, he reports he was able to follow along when watching a TV series. He explained he could remember previously what happened without having to go back. He endorses he is not able to be his own advocate with MDs, insurance companies, and  court and he'd like to become more independent with these ADLS. The Brief Cognitive Assessment Tool (BCAT) was administered. The instrument assesses orientation, verbal recall, visual recognition, visual recall, attention, abstraction, language, executive functions, visuospacial processing in adults and older adult population. Results of the sub-tests: scored as correct/total possible. Orientation: Patient was oriented to person, month, day of week, city, state, and situation:   Immediate verbal memory: 4/4 words; delayed verbal memory: 0/4 words    Visual recognition/naming 3 common objects: 3/3; delayed visual recall 0/3   Attention: tap when he heard a certain letter called out:   Mental control: count backwards 20-1:  Days of week backwards:   digits forward ; digits backwards:   Abstraction: tell the similarities: 3/3   Language: repeat a sentence   Fluency: list girls names: 2  Executive: cognitive shiftin/2  Arithmetic reasonin/1   Judgement: (how much time to a lot to get to an appointment): 0   Visuospatial; design:  Clock   Immediate story recall:  facts recalled:  Delayed story recall:   Story recognition:      Patient attained a score of 36 points out of a total of 50, indicating moderate cognitive deficits; specifically in the areas of immediate memory, delayed memory, attention, executive functioning, and problem solving. When he was last assessed during previous course of tx he obtained a 33/50 on the BCAT (he has since demo'd slow gains). Progress towards Goals:   Pt not seen since eval on 22 d/t awaiting insurance authorization, pt was ill, and then he was having \"difficulty\" when his medications were changed.  He returned to tx on 12/5/22. Goals: to be achieved in 4 weeks:  Short Term Goals:   1. Pt will recall x3/4 memory strategies and apply them to delayed recall tasks (3-4 words, 3-4 digits, picture retention, and simple novel information) given a 3-5 minute delay with 80% acc given Felipe to improve short term memory warranted for safety, QOL, and independence. 2. Pt will complete sustained/alternating attention tasks up to 3 minutes with 80% acc given Felipe/redirection to task with use of comp strategies in order to safely complete IADLS and increase communication competence. 3. Pt will complete functional problem solving tasks with 80% acc given min-modA. to enhance communication competence warranted for independent completion of ADLS. 4. Pt will complete functional and simulated cognitive communication ADL tasks with 80% acc given modA/redirection in order to increase executive function skills and independence. 5. Pt will particpate in further diagnostic tx to establish new baseline and most appropriate POC. Long Term Goals: To be accomplished in 4 weeks   1) Pt will develop improved functional cognitive-linguistic based communication skills with use of comp strategies to communicate wants/ needs effectively, maintain safety, independence and participate ADLS with 32% acc with min cues as supported by BCAT reassessment. ASSESSMENT:   Continue addressing all goals per original POC.     Certification Period: 12/5/22--1/2/23    ASSESSMENT/RECOMMENDATIONS:  [x]Continue therapy per initial plan/protocol at a frequency of  2 x per week for 4 weeks  []Continue therapy with the following recommended changes:_____________________      _____________________________________________________________________  []Discontinue therapy progressing towards or have reached established goals  []Discontinue therapy due to lack of appreciable progress towards goals  []Discontinue therapy due to lack of attendance or compliance  []Await Physician's recommendations/decisions regarding therapy  []Other:________________________________________________________________    Thank you for this referral.   Isabel Diaz SLP 12/5/2022 10:08 AM  MA, CCC-SLP  Speech-Language Pathologist      NOTE TO PHYSICIAN:  PLEASE COMPLETE THE ORDERS BELOW AND   FAX TO [facility]: [phone]   If you are unable to process this request in 24 hours please contact our office: [phone]     []  I have read the above report and request that my patient continue as recommended. []  I have read the above report and request that my patient continue therapy with the following changes/special instructions:________________________________________  []I have read the above report and request that my patient be discharged from therapy.     [de-identified] Signature:____________Date:_________TIME:________    [facility], Date and Time must be completed for valid certification **

## 2022-12-05 NOTE — PROGRESS NOTES
ST DAILY TREATMENT NOTE    Patient Name: Abimael Scott  Date:2022  : 1980  [x]  Patient  Verified  Payor: Payor: Dc 22 / Plan: [address] / Product Type: Managed Care Medicaid /   In time:935  Out time:1015  Total Treatment Time (min): 40  Visit #: 1 of 8  PN due 23  Treatment Diagnosis: Unspecified symptoms and signs involving cognitive functions and awareness [R41.9]    SUBJECTIVE  Pain Level (0-10 scale): 0  Any medication changes, allergies to medications, adverse drug reactions, diagnosis change, or new procedure performed?: [x] No    [] Yes (see summary sheet for update)    Subjective functional status/changes:   [] No changes reported  Pt returns to tx after not being seen since eval d/t being sick and then having some difficulty reported after a change in his medicine. OBJECTIVE  Treatment provided includes:  Increase/Improve:  []  Voice Quality [x]  Cognitive Linguistic Skills []  Laryngeal/Pharyngeal Exercises   []  Vocal Loudness []  Reading Comprehension []  Swallowing Skills    []  Vocal Cord Function []  Auditory Comprehension []  Oral Motor Skills   []  Resonance []  Writing Skills [x]  Compensatory strategies    []  Speech Intelligibility []  Expressive Language [x]  Attention   []  Breath Support/Coord.  []  Receptive language [x]  Memory   []  Articulation []  Safety Awareness [x] Pt educ   []  Fluency []  Word Retrieval []        Treatment Provided:  -pt educ  -comp strategies + delayed word recall  - problem solving  - attention    Patient/Caregiver  Education: [x] Review HEP      HEP/Handouts given: immediate/delayed novel recall    Pain Level (0-10 scale) post treatment: 0    ASSESSMENT   []   Improving appropriately and progressing toward goals  [x]   Improving slowly and progressing toward goals  []   Approximating goals/maximum potential  [x]   Continues to benefit from skilled therapy to address remaining functional deficits  []   Not progressing toward goals and plan of care will be adjusted    Patient will continue to benefit from skilled therapy to address remaining functional deficits: cognitive    Progress towards goals / Updated goals:  1. Pt will recall x3/4 memory strategies and apply them to delayed recall tasks (3-4 words, 3-4 digits, picture retention, and simple novel information) given a 3-5 minute delay with 80% acc given Felipe to improve short term memory warranted for safety, QOL, and independence. Current 12/5/22: Pt able to recall \"WRAP\" for comp strategies. Pt self implemented rep and write it down when trying to recallx 4 words following a 5 min delay: completed with 100% acc      2. Pt will complete sustained/alternating attention tasks up to 3 minutes with 80% acc given Felipe/redirection to task with use of comp strategies in order to safely complete IADLS and increase communication competence. Current 12/5/22: Opportunity #1: x3 min x1 initial cue  Opportunity #2:      3. Pt will complete functional problem solving tasks with 80% acc given min-modA. to enhance communication competence warranted for independent completion of ADLS. Current 12/5/22: 40% acc CARY and increasing to 80% acc given min cues    4. Pt will complete functional and simulated cognitive communication ADL tasks with 80% acc given modA/redirection in order to increase executive function skills and independence. Current 12/5/22:Not targeted this date    5. Pt will particpate in further diagnostic tx to establish new baseline and most appropriate POC.    Current 12/5/22: Not targeted this date    PLAN  [x]  Continue plan of care  []  Modify Goals/Treatment Plan      []  Discharge due to:  [] Other:    Khang Fraser, SLP 12/5/2022  9:42 AM  MA, CCC-SLP  Speech-Language Pathologist    Future Appointments   Date Time Provider Myrna Taveras   12/14/2022  9:30 AM [address] [facility]   12/19/2022  9:30 AM [address] [facility]   12/21/2022  9:30 AM [address] [facility]   12/28/2022  9:30 AM [facility]   1/4/2023  9:30 AM [facility]   1/9/2023  9:30 AM [facility]   1/11/2023  9:30 AM [facility]   1[facility]16/2023  9:30 AM [facility]   1/18/2023  9:30 AM [facility]   1/23/2023  9:30 AM [facility]   1/25/2023  9:30 AM [facility]  S Yoel Abbott

## 2022-12-14 ENCOUNTER — APPOINTMENT (OUTPATIENT)
Dept: PHYSICAL THERAPY | Age: 42
End: 2022-12-14
Payer: COMMERCIAL

## 2022-12-15 NOTE — PROGRESS NOTES
[address] [place] [address] PHYSICAL THERAPY AT [address] [facility] [address], [facility], [address]- Phone: [phone] Fax: [phone] SUMMARY  Patient Name: Patricia Varela : 1980   Treatment/Medical Diagnosis: Other abnormalities of gait and mobility [R26.89]  Other symptoms and signs involving the musculoskeletal system [R29.898]   Referral Source: Cynthia Benitez MD     Date of Initial Visit: 2022 Attended Visits: 11 Missed Visits: 3     SUMMARY OF TREATMENT  PT consisted of manual therapy techniques, therapeutic exercises, and modalities to improve strength, improve mobility, decrease pain, and improve overall function. CURRENT STATUS  Patient was unable to be formally reassessed secondary to not returning to PT after his 2022 visit. Goal/Measure of Progress Goal Met? 1. Increase score on FOTO to > or = to 60 points to demo an increase in functional activity tolerance with the LE   Status at last Eval: 29 Current Status: Unable to reassess no   2. Pt will demonstrate a GROC score of >/= +5 to show overall improvement in function   Status at last Eval: +4 Moderately Better Current Status: Unable to reassess Partially met         RECOMMENDATIONS  Discontinue therapy. Progressing towards or have reached established goals. Did not return. If you have any questions/comments please contact us directly at [phone]. Thank you for allowing us to assist in the care of your patient. LPTA Signature: Olivia Zamudio PTA Date: 22   Therapist Signature:  Time: 3:22 PM     To ensure we are able to process the patients encounter and avoid risk of your patient receiving a bill for our services, please sign and return this discharge summary .       Physician signature:__________________________   Date: _________                                                                                           Time:__________

## 2022-12-19 ENCOUNTER — HOSPITAL ENCOUNTER (OUTPATIENT)
Dept: PHYSICAL THERAPY | Age: 42
Discharge: HOME | End: 2022-12-19
Payer: COMMERCIAL

## 2022-12-19 PROCEDURE — 92507 TX SP LANG VOICE COMM INDIV: CPT

## 2022-12-19 NOTE — PROGRESS NOTES
ST DAILY TREATMENT NOTE    Patient Name: Heriberto Carter  Date:2022  : 1980  [x]  Patient  Verified  Payor: Payor: Dc 22 / Plan: [address] / Product Type: Managed Care Medicaid /   In time  [address] [place] [address] time:1018  Total Treatment Time (min): 40  Visit #: 2 of 8  PN due 23  Treatment Diagnosis: Unspecified symptoms and signs involving cognitive functions and awareness [R41.9]    SUBJECTIVE  Pain Level (0-10 scale): 0  Any medication changes, allergies to medications, adverse drug reactions, diagnosis change, or new procedure performed?: [x] No    [] Yes (see summary sheet for update)    Subjective functional status/changes:   [] No changes reported  Pt returns to tx  with completed HEP. OBJECTIVE  Treatment provided includes:  Increase/Improve:  []  Voice Quality [x]  Cognitive Linguistic Skills []  Laryngeal/Pharyngeal Exercises   []  Vocal Loudness []  Reading Comprehension []  Swallowing Skills    []  Vocal Cord Function []  Auditory Comprehension []  Oral Motor Skills   []  Resonance []  Writing Skills [x]  Compensatory strategies    []  Speech Intelligibility []  Expressive Language [x]  Attention   []  Breath Support/Coord.  []  Receptive language [x]  Memory   []  Articulation []  Safety Awareness [x] Pt educ   []  Fluency []  Word Retrieval []        Treatment Provided:  -pt educ  -comp strategies + delayed  novel recall; teaching of strategies, ie: highlighting when reading, repetition and then  taking notes  - Executive function simulated ADL task: grocery shopping     Patient/Caregiver  Education: [x] Review HEP      HEP/Handouts given: simulated ADL    Pain Level (0-10 scale) post treatment: 0    ASSESSMENT   []   Improving appropriately and progressing toward goals  [x]   Improving slowly and progressing toward goals  []   Approximating goals/maximum potential  [x]   Continues to benefit from skilled therapy to address remaining functional deficits  [] Not progressing toward goals and plan of care will be adjusted    Patient will continue to benefit from skilled therapy to address remaining functional deficits: cognitive    Progress towards goals / Updated goals: 1. Pt will recall x3/4 memory strategies and apply them to delayed recall tasks (3-4 words, 3-4 digits, picture retention, and simple novel information) given a 3-5 minute delay with 80% acc given Felipe to improve short term memory warranted for safety, QOL, and independence. Current 12/19/22: Simple novel recall: 90% acc given min-modA for set up/execution of comp strategies with 5 min delay    2. Pt will complete sustained/alternating attention tasks up to 3 minutes with 80% acc given Felipe/redirection to task with use of comp strategies in order to safely complete IADLS and increase communication competence. Current 12/19/22: Not targeted this date     3. Pt will complete functional problem solving tasks with 80% acc given min-modA. to enhance communication competence warranted for independent completion of ADLS. Current 12/19/22: Not targeted this date     4. Pt will complete functional and simulated cognitive communication ADL tasks with 80% acc given modA/redirection in order to increase executive function skills and independence. Current 12/19/22: 70% acc given modA/redirection     5. Pt will particpate in further diagnostic tx to establish new baseline and most appropriate POC.   Current 12/19/22: Not targeted this date    PLAN  [x]  Continue plan of care  []  Modify Goals/Treatment Plan      []  Discharge due to:  [] Other:    Delia Vincent SLP 12/19/2022  9:42 AM  MA, CCC-SLP  Speech-Language Pathologist    Future Appointments   Date Time Provider Myrna Taveras   12/19/2022  9:30 AM [address] [facility]   12/21/2022  9:30 AM [address] [facility]   12/28/2022  9:30 AM [address] [facility]   1/4/2023  9:30 AM [address] [facility]   1/9/2023  9:30 AM [address] [facility]   1/11/2023  9:30 AM [address] [facility]   1/16/2023  9:30 AM [facility] PT [facility]   1/18/2023  9:30 AM [facility] PT [facility]   1/23/2023  9:30 AM [facility] PT [facility]   1/25/2023  9:30 AM [place]

## 2022-12-21 ENCOUNTER — HOSPITAL ENCOUNTER (OUTPATIENT)
Dept: PHYSICAL THERAPY | Age: 42
Discharge: HOME | End: 2022-12-21
Payer: COMMERCIAL

## 2022-12-21 PROCEDURE — 92507 TX SP LANG VOICE COMM INDIV: CPT

## 2022-12-21 NOTE — PROGRESS NOTES
ST DAILY TREATMENT NOTE    Patient Name: Brooklynn Martinez  Date:2022  : 1980  [x]  Patient  Verified  Payor: Payor: Dc 22 / Plan: [address] / Product Type: Managed Care Medicaid /   In time  [address] [place] [address] time:1015  Total Treatment Time (min): 40  Visit #: 3 of 8  PN due 23  Treatment Diagnosis: Unspecified symptoms and signs involving cognitive functions and awareness [R41.9]    SUBJECTIVE  Pain Level (0-10 scale): 0  Any medication changes, allergies to medications, adverse drug reactions, diagnosis change, or new procedure performed?: [x] No    [] Yes (see summary sheet for update)    Subjective functional status/changes:   [] No changes reported  Pt demo'ing improved processing time, in addition to acc in therapeutic tasks. He did however, forget to complete/ misplaced HEP    OBJECTIVE  Treatment provided includes:  Increase/Improve:  []  Voice Quality [x]  Cognitive Linguistic Skills []  Laryngeal/Pharyngeal Exercises   []  Vocal Loudness []  Reading Comprehension []  Swallowing Skills    []  Vocal Cord Function []  Auditory Comprehension []  Oral Motor Skills   []  Resonance []  Writing Skills [x]  Compensatory strategies    []  Speech Intelligibility []  Expressive Language [x]  Attention   []  Breath Support/Coord.  []  Receptive language [x]  Memory   []  Articulation []  Safety Awareness [x] Pt educ   []  Fluency []  Word Retrieval []        Treatment Provided:  -pt educ  -attention  -delayed recall  -problem solving  -simulated ADL completion    Patient/Caregiver  Education: [x] Review HEP      HEP/Handouts given: simulated ADL    Pain Level (0-10 scale) post treatment: 0    ASSESSMENT   []   Improving appropriately and progressing toward goals  [x]   Improving slowly and progressing toward goals  []   Approximating goals/maximum potential  [x]   Continues to benefit from skilled therapy to address remaining functional deficits  []   Not progressing toward goals and plan of care will be adjusted    Patient will continue to benefit from skilled therapy to address remaining functional deficits: cognitive    Progress towards goals / Updated goals: 1. Pt will recall x3/4 memory strategies and apply them to delayed recall tasks (3-4 words, 3-4 digits, picture retention, and simple novel information) given a 3-5 minute delay with 80% acc given Felipe to improve short term memory warranted for safety, QOL, and independence. Current 12/21/22: Simple novel recall: 80% acc given Felipe for set up/execution of comp strategies with 5 min delay    2. Pt will complete sustained/alternating attention tasks up to 3 minutes with 80% acc given Felipe/redirection to task with use of comp strategies in order to safely complete IADLS and increase communication competence. Current 12/21/22: Sustained attention: Dual symbol visual scanning cancellation task x3 min: 94% acc magaly; Word scrambles: 80% acc CARY and increasing to 93% acc given Felipe    3. Pt will complete functional problem solving tasks with 80% acc given min-modA. to enhance communication competence warranted for independent completion of ADLS. Current 12/21/22: Numerical sequences (find the pattern) 100% acc magaly; Deductive reasoning with calendar: 100% acc magaly     4. Pt will complete functional and simulated cognitive communication ADL tasks with 80% acc given modA/redirection in order to increase executive function skills and independence. Current 12/21/22:  Sequencing of simulated ADLS: 106% acc CARY    5. Pt will particpate in further diagnostic tx to establish new baseline and most appropriate POC.   Current 12/21/22: Not targeted this date    PLAN  [x]  Continue plan of care  []  Modify Goals/Treatment Plan      []  Discharge due to:  [] Other:    Katiana Mejia SLP 12/21/2022  9:42 AM  MA, CCC-SLP  Speech-Language Pathologist    Future Appointments   Date Time Provider Myrna Taveras   12/21/2022  9:30 AM [facility] Myrna Ortiz [facility]   12/28/2022  9:30 AM [address] [facility]   1/4/2023  9:30 AM [address] [facility]   1/9/2023  9:30 AM [address] [facility]   1[facility]11/2023  9:30 AM [facility] PT [facility]   1/16/2023  9:30 AM [facility] PT [facility] SPE[facility]   1/18/2023  9:30 AM [facility] PT [facility]   1/23/2023  9:30 AM [facility] PT [facility]   1/25/2023  9:30 AM [facility]  S [place]

## 2023-01-04 ENCOUNTER — HOSPITAL ENCOUNTER (OUTPATIENT)
Dept: PHYSICAL THERAPY | Age: 43
Discharge: HOME | End: 2023-01-04
Payer: COMMERCIAL

## 2023-01-04 PROCEDURE — 92507 TX SP LANG VOICE COMM INDIV: CPT

## 2023-01-04 NOTE — PROGRESS NOTES
ST DAILY TREATMENT NOTE    Patient Name: Carly Cancel  Date:2023  : 1980  [x]  Patient  Verified  Payor: Payor: Dc 22 / Plan: [address] / Product Type: Managed Care Medicaid /   In time  952 Out time:1010  Total Treatment Time (min): 18  Visit #: 1 of 8  PN due 23  Treatment Diagnosis: Unspecified symptoms and signs involving cognitive functions and awareness [R41.9]    SUBJECTIVE  Pain Level (0-10 scale): 0  Any medication changes, allergies to medications, adverse drug reactions, diagnosis change, or new procedure performed?: [x] No    [] Yes (see summary sheet for update)    Subjective functional status/changes:   [] No changes reported  Short session provided d/t pt's transportation being late. Pt called to advise SLP. OBJECTIVE  Treatment provided includes:  Increase/Improve:  []  Voice Quality [x]  Cognitive Linguistic Skills []  Laryngeal/Pharyngeal Exercises   []  Vocal Loudness []  Reading Comprehension []  Swallowing Skills    []  Vocal Cord Function []  Auditory Comprehension []  Oral Motor Skills   []  Resonance []  Writing Skills [x]  Compensatory strategies    []  Speech Intelligibility []  Expressive Language [x]  Attention   []  Breath Support/Coord.  []  Receptive language [x]  Memory   []  Articulation []  Safety Awareness [x] Pt educ   []  Fluency []  Word Retrieval []        Treatment Provided:  -pt educ  -immediate recall  -problem solving  -simulated ADL completion    Patient/Caregiver  Education: [x] Review HEP      HEP/Handouts given: simulated ADL    Pain Level (0-10 scale) post treatment: 0    ASSESSMENT   []   Improving appropriately and progressing toward goals  [x]   Improving slowly and progressing toward goals  []   Approximating goals/maximum potential  [x]   Continues to benefit from skilled therapy to address remaining functional deficits  []   Not progressing toward goals and plan of care will be adjusted    Patient will continue to benefit from skilled therapy to address remaining functional deficits: cognitive    Progress towards goals / Updated goals: 1. Pt will recall x3/4 memory strategies and apply them to delayed recall tasks (3-4 words, 3-4 digits, picture retention, and simple novel information) given a 3-5 minute delay with 80% acc given Felipe to improve short term memory warranted for safety, QOL, and independence. Current 1/4/23: Simple novel IMMEDIATE recall: 73% acc given Felipe/ reps and 87% acc given additional min-mod verbal cues/redirection    2. Pt will complete sustained/alternating attention tasks up to 3 minutes with 80% acc given Felipe/redirection to task with use of comp strategies in order to safely complete IADLS and increase communication competence. Current 1/4/23: Not targeted this date    3. Pt will complete functional problem solving tasks with 80% acc given min-modA. to enhance communication competence warranted for independent completion of ADLS. Current 1/4/23: Situational 80% acc Felipe    4. Pt will complete functional and simulated cognitive communication ADL tasks with 80% acc given modA/redirection in order to increase executive function skills and independence. Current 1/4/23:  purchasing tickets at movie box office: 57% acc CARY and increasing to 100% acc given modA/redirection     5. Pt will particpate in further diagnostic tx to establish new baseline and most appropriate POC.   Current 1/4/23: Not targeted this date    PLAN  []  Continue plan of care  [x]  Modify Goals/Treatment Plan      []  Discharge due to:  [] Other:    KAYLA Shankar 1/4/2023  9:42 AM  MA, CCC-SLP  Speech-Language Pathologist    Future Appointments   Date Time Provider Myrna Taveras   1/9/2023  9:30 AM [address] [facility]   1/11/2023  9:30 AM [address] [facility]   1/16/2023  9:30 AM [address] [facility]   1/18/2023  9:30 AM Meli 1/23/2023  9:30 AM [address] [facility]   1/25/2023  9:30 AM [facility] PT [id number] [facility]

## 2023-01-09 ENCOUNTER — HOSPITAL ENCOUNTER (OUTPATIENT)
Dept: PHYSICAL THERAPY | Age: 43
Discharge: HOME | End: 2023-01-09
Payer: COMMERCIAL

## 2023-01-09 PROCEDURE — 92507 TX SP LANG VOICE COMM INDIV: CPT

## 2023-01-09 NOTE — PROGRESS NOTES
ST DAILY TREATMENT NOTE    Patient Name: Madhavi Sparks  Date:2023  : 1980  [x]  Patient  Verified  Payor: Payor: Dc 22 / Plan: [address] / Product Type: Managed Care Medicaid /   In time  930 Out time:1000  Total Treatment Time (min): 30  Visit #: 2 of 8  PN due 23  Treatment Diagnosis: Unspecified symptoms and signs involving cognitive functions and awareness [R41.9]    SUBJECTIVE  Pain Level (0-10 scale): 0  Any medication changes, allergies to medications, adverse drug reactions, diagnosis change, or new procedure performed?: [x] No    [] Yes (see summary sheet for update)    Subjective functional status/changes:   [x] No changes reported  Pt reports doing better, but still misplacing and forgetting things. He became flustered when completing an alternative/ attention task warranted max redirection to correct error  OBJECTIVE  Treatment provided includes:  Increase/Improve:  []  Voice Quality [x]  Cognitive Linguistic Skills []  Laryngeal/Pharyngeal Exercises   []  Vocal Loudness []  Reading Comprehension []  Swallowing Skills    []  Vocal Cord Function []  Auditory Comprehension []  Oral Motor Skills   []  Resonance []  Writing Skills [x]  Compensatory strategies    []  Speech Intelligibility []  Expressive Language [x]  Attention   []  Breath Support/Coord.  []  Receptive language []  Memory   []  Articulation []  Safety Awareness [x] Pt educ   []  Fluency []  Word Retrieval []        Treatment Provided:  -pt educ  -attetion  -problem solving  -simulated ADL completion    Patient/Caregiver  Education: [x] Review HEP      HEP/Handouts given: simulated ADL    Pain Level (0-10 scale) post treatment: 0    ASSESSMENT   []   Improving appropriately and progressing toward goals  [x]   Improving slowly and progressing toward goals  []   Approximating goals/maximum potential  [x]   Continues to benefit from skilled therapy to address remaining functional deficits  [] Not progressing toward goals and plan of care will be adjusted    Patient will continue to benefit from skilled therapy to address remaining functional deficits: cognitive    Progress towards goals / Updated goals: 1. Pt will recall x3/4 memory strategies and apply them to delayed recall tasks (3-4 words, 3-4 digits, picture retention, and simple novel information) given a 3-5 minute delay with 80% acc given Felipe to improve short term memory warranted for safety, QOL, and independence. Current 23: Address next session    2. Pt will complete sustained/alternating attention tasks up to 3 minutes with 80% acc given Felipe/redirection to task with use of comp strategies in order to safely complete IADLS and increase communication competence. Current 23:  4 min: 92% acc 100% acc given min-mod cues  Alternatin mins maxA    3. Pt will complete functional problem solving tasks with 80% acc given min-modA. to enhance communication competence warranted for independent completion of ADLS. Current 23: Logic: 50% acc CARY and 80% acc given mod-maxA     4. Pt will complete functional and simulated cognitive communication ADL tasks with 80% acc given modA/redirection in order to increase executive function skills and independence. Current 23:   Meal prep/cooking planning task: 71% acc CARY and 86% acc given Felipe/redirection     5. Pt will particpate in further diagnostic tx to establish new baseline and most appropriate POC.   Current 23: Not targeted this date    PLAN  [x]  Continue plan of care  []  Modify Goals/Treatment Plan      []  Discharge due to:  [] Other:    Kentrell Solorio SLP 2023  9:42 AM  MA, CCC-SLP  Speech-Language Pathologist    Future Appointments   Date Time Provider Myrna Taveras   2023  9:30 AM [address] [facility]   2023  9:30 AM [address] [facility]   2023  9:30 AM [address] [facility]   2023  9:30 AM [facility] PT [id number] [facility]   1/25/2023  9:30 AM Ramonita Copeland [facility]

## 2023-01-11 ENCOUNTER — HOSPITAL ENCOUNTER (OUTPATIENT)
Dept: PHYSICAL THERAPY | Age: 43
Discharge: HOME | End: 2023-01-11
Payer: COMMERCIAL

## 2023-01-11 PROCEDURE — 92507 TX SP LANG VOICE COMM INDIV: CPT

## 2023-01-11 NOTE — PROGRESS NOTES
ST DAILY TREATMENT NOTE    Patient Name: Jeb Moralez  Date:2023  : 1980  [x]  Patient  Verified  Payor: Payor: Dc 22 / Plan: [address] / Product Type: Managed Care Medicaid /   In time  361 Out time:1006  Total Treatment Time (min): 33  Visit #: 2 of 8  PN due 23- 12V  Treatment Diagnosis: Unspecified symptoms and signs involving cognitive functions and awareness [R41.9]    SUBJECTIVE  Pain Level (0-10 scale): 0  Any medication changes, allergies to medications, adverse drug reactions, diagnosis change, or new procedure performed?: [x] No    [] Yes (see summary sheet for update)    Subjective functional status/changes:   [x] No changes reported  Pt  entered tx session flustered. He reported conflicting schedules with his brother not allowing him to shower this morning and not knowing how to handle. Additionally, he voiced concern that the phone service [employer] Program through [employer] might get shut off d/t the New Year and he wasn't sure how to find out if he remains eligible. OBJECTIVE  Treatment provided includes:  Increase/Improve:  []  Voice Quality [x]  Cognitive Linguistic Skills []  Laryngeal/Pharyngeal Exercises   []  Vocal Loudness []  Reading Comprehension []  Swallowing Skills    []  Vocal Cord Function []  Auditory Comprehension []  Oral Motor Skills   []  Resonance []  Writing Skills []  Compensatory strategies    []  Speech Intelligibility []  Expressive Language []  Attention   []  Breath Support/Coord.  []  Receptive language []  Memory   []  Articulation []  Safety Awareness [x] Pt educ   []  Fluency []  Word Retrieval []        Treatment Provided:  -pt educ  -problem solving  -simulated ADL completion    Patient/Caregiver  Education: [x] Review HEP      HEP/Handouts given: simulated ADL    Pain Level (0-10 scale) post treatment: 0    ASSESSMENT   []   Improving appropriately and progressing toward goals  [x]   Improving slowly and progressing toward goals  []   Approximating goals/maximum potential  [x]   Continues to benefit from skilled therapy to address remaining functional deficits  []   Not progressing toward goals and plan of care will be adjusted    Patient will continue to benefit from skilled therapy to address remaining functional deficits: cognitive    Progress towards goals / Updated goals: 1. Pt will recall x3/4 memory strategies and apply them to delayed recall tasks (3-4 words, 3-4 digits, picture retention, and simple novel information) given a 3-5 minute delay with 80% acc given Felipe to improve short term memory warranted for safety, QOL, and independence. Current 1/11/23: Address next session    2. Pt will complete sustained/alternating attention tasks up to 3 minutes with 80% acc given Felipe/redirection to task with use of comp strategies in order to safely complete IADLS and increase communication competence. Current 1/11/23:  Not targeted this date    3. Pt will complete functional problem solving tasks with 80% acc given min-modA. to enhance communication competence warranted for independent completion of ADLS. Current 1/11/23: Situational:  Time management: 100% acc given min-modA / redirection; Solutions ot bathroom routine in household being a barrier of limitation to arrive to appointments on time: 50% acc given modA. 4. Pt will complete functional and simulated cognitive communication ADL tasks with 80% acc given modA/redirection in order to increase executive function skills and independence. Current 1/11/23:   Contacting [employer] to ensure qualification of ACP: maxA/ teaching session. 5. Pt will particpate in further diagnostic tx to establish new baseline and most appropriate POC.   Current 1/11/23: Not targeted this date    PLAN  [x]  Continue plan of care  []  Modify Goals/Treatment Plan      []  Discharge due to:  [] Other:    Gaby Amaral, SLP 1/11/2023  9:42 AM  MA, CCC-SLP  Speech-Language Pathologist    Future Appointments   Date Time Provider Department Center   1/16/2023  9:30 AM [address] [facility]   1/18/2023  9:30 AM [address] [facility]   1/23/2023  9:30 AM [address] [facility]   1/25/2023  9:30 AM [address] [facility]

## 2023-01-13 ENCOUNTER — TELEPHONE (OUTPATIENT)
Dept: NEUROLOGY | Age: 43
End: 2023-01-13

## 2023-01-13 NOTE — TELEPHONE ENCOUNTER
PT mother called to notify that pt is having extreme pain episodes. He currently goes to clinic to get pain relief. Pt mom stated he's having extreme manic episodes and mother is afraid of him.  Please advise with ppt mother

## 2023-01-13 NOTE — TELEPHONE ENCOUNTER
Spoke with patient's Mom, verified name and , encouraged patient to go to ER or reports outburst to therapist,

## 2023-01-18 ENCOUNTER — HOSPITAL ENCOUNTER (OUTPATIENT)
Dept: PHYSICAL THERAPY | Age: 43
Discharge: HOME | End: 2023-01-18
Payer: COMMERCIAL

## 2023-01-18 PROCEDURE — 92507 TX SP LANG VOICE COMM INDIV: CPT

## 2023-01-18 NOTE — PROGRESS NOTES
ST DAILY TREATMENT NOTE    Patient Name: Randee Carroll  Date:2023  : 1980  [x]  Patient  Verified  Payor: Payor: Dc 22 / Plan: [address] / Product Type: Managed Care Medicaid /   In time  901 Out time:945  Total Treatment Time (min): 44  Visit #: 4 of 8  PN due 23- 12V  Treatment Diagnosis: Unspecified symptoms and signs involving cognitive functions and awareness [R41.9]    SUBJECTIVE  Pain Level (0-10 scale): 0  Any medication changes, allergies to medications, adverse drug reactions, diagnosis change, or new procedure performed?: [x] No    [] Yes (see summary sheet for update)    Subjective functional status/changes:   [x] No changes reported  Pt presents this date inquiring how he can apply for a disability he states he previously spoke with his PCP but felt he was getting the run around. SLP will d/w referral source. OBJECTIVE  Treatment provided includes:  Increase/Improve:  []  Voice Quality [x]  Cognitive Linguistic Skills []  Laryngeal/Pharyngeal Exercises   []  Vocal Loudness []  Reading Comprehension []  Swallowing Skills    []  Vocal Cord Function []  Auditory Comprehension []  Oral Motor Skills   []  Resonance []  Writing Skills []  Compensatory strategies    []  Speech Intelligibility []  Expressive Language []  Attention   []  Breath Support/Coord.  []  Receptive language []  Memory   []  Articulation []  Safety Awareness [x] Pt educ   []  Fluency []  Word Retrieval []        Treatment Provided:  -pt educ  -problem solving    Patient/Caregiver  Education: [x] Review HEP      HEP/Handouts given: alternating attention     Pain Level (0-10 scale) post treatment: 0    ASSESSMENT   []   Improving appropriately and progressing toward goals  [x]   Improving slowly and progressing toward goals  []   Approximating goals/maximum potential  [x]   Continues to benefit from skilled therapy to address remaining functional deficits  []   Not progressing toward goals and plan of care will be adjusted    Patient will continue to benefit from skilled therapy to address remaining functional deficits: cognitive    Progress towards goals / Updated goals: 1. Pt will recall x3/4 memory strategies and apply them to delayed recall tasks (3-4 words, 3-4 digits, picture retention, and simple novel information) given a 3-5 minute delay with 80% acc given Felipe to improve short term memory warranted for safety, QOL, and independence. Current 1/18/23: Address next session    2. Pt will complete sustained/alternating attention tasks up to 3 minutes with 80% acc given Felipe/redirection to task with use of comp strategies in order to safely complete IADLS and increase communication competence. Current 1/18/23:  Not targeted this date    3. Pt will complete functional problem solving tasks with 80% acc given min-modA. to enhance communication competence warranted for independent completion of ADLS. Current 1/18/23: Logic:60% acc following min-mod cues set up and 90% acc  given modA/redirection d/t reduced alternating attention    4. Pt will complete functional and simulated cognitive communication ADL tasks with 80% acc given modA/redirection in order to increase executive function skills and independence. Current 1/18/23:  Not targeted this date    5. Pt will particpate in further diagnostic tx to establish new baseline and most appropriate POC.   Current 1/18/23:  Not targeted this date    PLAN  [x]  Continue plan of care  []  Modify Goals/Treatment Plan      []  Discharge due to:  [] Other:    KAYLA Kingston 1/18/2023  9:42 AM  MA, CCC-SLP  Speech-Language Pathologist    Future Appointments   Date Time Provider Myrna Taveras   1/18/2023  9:30 AM [address] [facility]   1/23/2023  9:30 AM [address] [facility]   1/25/2023  9:30 AM [address] [facility]   4/27/2023  9:30 AM Magalys Bautista, NP [facility]

## 2023-01-23 ENCOUNTER — HOSPITAL ENCOUNTER (OUTPATIENT)
Dept: PHYSICAL THERAPY | Age: 43
Discharge: HOME | End: 2023-01-23
Payer: COMMERCIAL

## 2023-01-23 PROCEDURE — 92507 TX SP LANG VOICE COMM INDIV: CPT

## 2023-01-23 NOTE — PROGRESS NOTES
ST DAILY TREATMENT NOTE    Patient Name: Carly Cancel  Date:2023  : 1980  [x]  Patient  Verified  Payor: Payor: Dc 22 / Plan: [address] / Product Type: Managed Care Medicaid /   In time  56 Out time:1000  Total Treatment Time (min): 26  Visit #: 5 of 8  PN due 23- 12V  Treatment Diagnosis: Unspecified symptoms and signs involving cognitive functions and awareness [R41.9]    SUBJECTIVE  Pain Level (0-10 scale): L foot 7  Any medication changes, allergies to medications, adverse drug reactions, diagnosis change, or new procedure performed?: [x] No    [] Yes (see summary sheet for update)    Subjective functional status/changes:   [x] No changes reported  Pt presents this date inquiring how he can apply for a disability he states he previously spoke with his PCP but felt he was getting the run around. SLP will d/w referral source. OBJECTIVE  Treatment provided includes:  Increase/Improve:  []  Voice Quality [x]  Cognitive Linguistic Skills []  Laryngeal/Pharyngeal Exercises   []  Vocal Loudness []  Reading Comprehension []  Swallowing Skills    []  Vocal Cord Function []  Auditory Comprehension []  Oral Motor Skills   []  Resonance []  Writing Skills []  Compensatory strategies    []  Speech Intelligibility []  Expressive Language [x]  Attention   []  Breath Support/Coord.  []  Receptive language [x]  Memory   []  Articulation []  Safety Awareness [x] Pt educ   []  Fluency []  Word Retrieval []        Treatment Provided:  -pt educ  -delayed recall/ comp strategies  -attention    Patient/Caregiver  Education: [x] Review HEP      HEP/Handouts given: alternating attention     Pain Level (0-10 scale) post treatment: 0    ASSESSMENT   []   Improving appropriately and progressing toward goals  [x]   Improving slowly and progressing toward goals  []   Approximating goals/maximum potential  [x]   Continues to benefit from skilled therapy to address remaining functional deficits  []   Not progressing toward goals and plan of care will be adjusted    Patient will continue to benefit from skilled therapy to address remaining functional deficits: cognitive    Progress towards goals / Updated goals: 1. Pt will recall x3/4 memory strategies and apply them to delayed recall tasks (3-4 words, 3-4 digits, picture retention, and simple novel information) given a 3-5 minute delay with 80% acc given Felipe to improve short term memory warranted for safety, QOL, and independence. Current 1/23/23:  Able to recall comp strategies, Novel recall: 27% acc CARY and increasing to 55% acc given modA    2. Pt will complete sustained/alternating attention tasks up to 3 minutes with 80% acc given Felipe/redirection to task with use of comp strategies in order to safely complete IADLS and increase communication competence. Current 1/23/23:  up  to 3 minutes #1: 72% acc Felipe; #2 67% acc CARY    3. Pt will complete functional problem solving tasks with 80% acc given min-modA. to enhance communication competence warranted for independent completion of ADLS. Current 1/23/23: assigned for HEP    4. Pt will complete functional and simulated cognitive communication ADL tasks with 80% acc given modA/redirection in order to increase executive function skills and independence. Current 1/23/23:  Not targeted this date    5. Pt will particpate in further diagnostic tx to establish new baseline and most appropriate POC.   Current 1/23/23:  Not targeted this date    PLAN  [x]  Continue plan of care  []  Modify Goals/Treatment Plan      []  Discharge due to:  [] Other:    KAYLA Gonzalez 1/23/2023  9:42 AM  MA, CCC-SLP  Speech-Language Pathologist    Future Appointments   Date Time Provider Myrna Taveras   1/25/2023  9:30 AM Ramonita Copeland [facility]   4/27/2023  9:30 AM Chau Bautista, NP [facility]

## 2023-01-25 ENCOUNTER — HOSPITAL ENCOUNTER (OUTPATIENT)
Dept: PHYSICAL THERAPY | Age: 43
End: 2023-01-25
Payer: COMMERCIAL

## 2023-02-15 ENCOUNTER — APPOINTMENT (OUTPATIENT)
Facility: HOSPITAL | Age: 43
End: 2023-02-15
Payer: COMMERCIAL

## 2023-02-15 ENCOUNTER — APPOINTMENT (OUTPATIENT)
Dept: PHYSICAL THERAPY | Age: 43
End: 2023-02-15

## 2023-02-20 ENCOUNTER — TELEPHONE (OUTPATIENT)
Facility: HOSPITAL | Age: 43
End: 2023-02-20

## 2023-02-20 ENCOUNTER — APPOINTMENT (OUTPATIENT)
Dept: PHYSICAL THERAPY | Age: 43
End: 2023-02-20

## 2023-02-22 ENCOUNTER — TELEPHONE (OUTPATIENT)
Age: 43
End: 2023-02-22

## 2023-02-22 ENCOUNTER — HOSPITAL ENCOUNTER (OUTPATIENT)
Facility: HOSPITAL | Age: 43
Setting detail: RECURRING SERIES
Discharge: HOME | End: 2023-02-25
Payer: COMMERCIAL

## 2023-02-22 ENCOUNTER — APPOINTMENT (OUTPATIENT)
Dept: PHYSICAL THERAPY | Age: 43
End: 2023-02-22

## 2023-02-22 DIAGNOSIS — R41.9 COGNITIVE COMPLAINTS: Primary | ICD-10-CM

## 2023-02-22 DIAGNOSIS — R41.3 MEMORY LOSS: ICD-10-CM

## 2023-02-22 PROCEDURE — 92507 TX SP LANG VOICE COMM INDIV: CPT

## 2023-02-22 NOTE — PROGRESS NOTES
ST DAILY TREATMENT NOTE    Patient Name: Viki Chandler  Date:2023  : 1980  [x]  Patient  Verified  Payor: Payor: [address] / Plan: [facility] / Product Type: *No Product type* /   In time:935  Out time:1010  Total Treatment Time (min): 35  Visit #: 1 of 8  *PN due 3/22/23*    Treatment Diagnosis: R41.841    SUBJECTIVE  Pain Level (0-10 scale): 0  Any medication changes, allergies to medications, adverse drug reactions, diagnosis change, or new procedure performed?: [x] No    [] Yes (see summary sheet for update)    Subjective functional status/changes:   [] No changes reported  Pt returns to tx after not being seen since 23. He was sick and then was on hold d/t waiting for insurance authorization. OBJECTIVE  Treatment provided includes:  Increase/Improve:  []  Voice Quality []  Cognitive Linguistic Skills []  Laryngeal/Pharyngeal Exercises   []  Vocal Loudness []  Reading Comprehension []  Swallowing Skills    []  Vocal Cord Function []  Auditory Comprehension []  Oral Motor Skills   []  Resonance []  Writing Skills []  Compensatory strategies    []  Speech Intelligibility []  Expressive Language []  Attention   []  Breath Support/Coord.  []  Receptive language []  Memory   []  Articulation []  Safety Awareness []    []  Fluency []  Word Retrieval []        Treatment Provided:  - completion of ADLS tasks  -pt educ    Patient/Caregiver  Education: [x] Review HEP      HEP/Handouts given: follow up re task completed this date    Pain Level (0-10 scale) post treatment: 0    ASSESSMENT    []   Improving appropriately and progressing toward goals  [x]   Improving slowly and progressing toward goals  []   Approximating goals/maximum potential  [x]   Continues to benefit from skilled therapy to address remaining functional deficits  []   Not progressing toward goals and plan of care will be adjusted    Patient will continue to benefit from skilled therapy to address remaining functional deficits: cognitive linguistics      Progress towards goals / Updated goals:  1. Pt will recall x3/4 memory strategies and apply them to delayed recall tasks (3-4 words, 3-4 digits, picture retention, and simple novel information) given a 5-10 minute delay with 85% acc given Glenna to improve short term memory warranted for safety, QOL, and independence. Current 2/22/23: Not targeted this date     2. Pt will complete sustained/alternating attention tasks up to 3 minutes with 80% acc given Glenna/redirection to task with use of comp strategies in order to safely complete IADLS and increase communication competence. Current 2/22/23: Not targeted this date     3. Pt will complete functional problem solving tasks with 80% acc given min-modA. to enhance communication competence warranted for independent completion of ADLS. Current 2/22/23: Not targeted this date     4. Pt will complete functional and simulated cognitive communication ADL tasks with 80% acc given modA/redirection in order to increase executive function skills and independence. Current 2/22/23: MaxA provided for set up to initiate calling neuro office to inquire appointment referral to neuro psych. Pt spoke with , however, recepionist had no idea what pt was speaking about. With pt's permission, SLP stepped in to assst. SLP will also send a staff msg to Anna Hernandez re: previous inquiry. Pt assigned HEP to follow up with neuro on Fri to see if referral was obtained.      PLAN  [x]  Continue plan of care  []  Modify Goals/Treatment Plan      []  Discharge due to:  [] Other:    LETTY Thompson 2/22/2023  9:42 AM  MA, CCC-SLP  Speech-Language Pathologist    Future Appointments   Date Time Provider Dusty Barrios   4/27/2023  9:30 AM ZHEN Toney - NP [facility] AMB

## 2023-03-08 ENCOUNTER — APPOINTMENT (OUTPATIENT)
Facility: HOSPITAL | Age: 43
End: 2023-03-08
Payer: COMMERCIAL

## 2023-03-11 DIAGNOSIS — R20.2 PARESTHESIA OF SKIN: Primary | ICD-10-CM

## 2023-03-13 ENCOUNTER — HOSPITAL ENCOUNTER (OUTPATIENT)
Facility: HOSPITAL | Age: 43
Setting detail: RECURRING SERIES
Discharge: HOME | End: 2023-03-16
Payer: COMMERCIAL

## 2023-03-13 PROCEDURE — 92507 TX SP LANG VOICE COMM INDIV: CPT

## 2023-03-13 NOTE — PROGRESS NOTES
ST DAILY TREATMENT NOTE    Patient Name: Fritz James  Date:3/13/2023  : 1980  [x]  Patient  Verified  Payor: Payor: [id number] / Plan: [facility] / Product Type: *No Product type* /   In time:935  Out time:1010  Total Treatment Time (min): 35  Visit #: 2 of 8  *PN due 3/22/23*    Treatment Diagnosis: R41.841    SUBJECTIVE  Pain Level (0-10 scale): 0  Any medication changes, allergies to medications, adverse drug reactions, diagnosis change, or new procedure performed?: [x] No    [] Yes (see summary sheet for update)    Subjective functional status/changes:   [] No changes reported  Pt reports he forgets where he is at as far as getting a neuro psych appt. He reports he thinks [facility] office called him, but he believes he is having difficulty getting his insurance to authorize neuro psych testing. He attempted to phone his mom as he believes she's been involved in the process; he left a message. SLP requested his mom call SLP back. OBJECTIVE  Treatment provided includes:  Increase/Improve:  []  Voice Quality [x]  Cognitive Linguistic Skills []  Laryngeal/Pharyngeal Exercises   []  Vocal Loudness []  Reading Comprehension []  Swallowing Skills    []  Vocal Cord Function []  Auditory Comprehension []  Oral Motor Skills   []  Resonance []  Writing Skills [x]  Compensatory strategies    []  Speech Intelligibility []  Expressive Language [x]  Attention   []  Breath Support/Coord.  []  Receptive language [x]  Memory   []  Articulation []  Safety Awareness [x] Pt educ   []  Fluency []  Word Retrieval []        Treatment Provided:  - attention  -STM  -pt educ    Patient/Caregiver  Education: [x] Review HEP      HEP/Handouts given: follow up re task completed this date    Pain Level (0-10 scale) post treatment: 0    ASSESSMENT    []   Improving appropriately and progressing toward goals  [x]   Improving slowly and progressing toward goals  []   Approximating goals/maximum potential  [x]   Continues to benefit from skilled therapy to address remaining functional deficits  []   Not progressing toward goals and plan of care will be adjusted    Patient will continue to benefit from skilled therapy to address remaining functional deficits: cognitive linguistics      Progress towards goals / Updated goals:  1. Pt will recall x3/4 memory strategies and apply them to delayed recall tasks (3-4 words, 3-4 digits, picture retention, and simple novel information) given a 5-10 minute delay with 85% acc given Glenna to improve short term memory warranted for safety, QOL, and independence.  Current 3/13/23:  Simple novel x5 min delay: 66% acc given min-mod cues     2. Pt will complete sustained/alternating attention tasks up to 3 minutes with 80% acc given Glenna/redirection to task with use of comp strategies in order to safely complete IADLS and increase communication competence.  Current 3/13/23:Alternating attention: (#1 opportunity): 80% acc given x5 verbal cues/modA/redirections;  (#2 opportunity): 95% acc given x2 verbal cues/min-modA/redirections       3. Pt will complete functional problem solving tasks with 80% acc given min-modA. to enhance communication competence warranted for independent completion of ADLS.  Current 3/13/23: Not targeted this date     4. Pt will complete functional and simulated cognitive communication ADL tasks with 80% acc given modA/redirection in order to increase executive function skills and independence.  Current 3/13/23:Not targeted this date    PLAN  [x]  Continue plan of care  []  Modify Goals/Treatment Plan      []  Discharge due to:  [] Other:    Paulette Page SLP 3/13/2023  9:25 AM  MA, CCC-SLP  Speech-Language Pathologist    Future Appointments   Date Time Provider Department [place]   3/13/2023  9:30 AM [facility] PT [facility]   3/15/2023  8:30 AM [facility] PT [facility]   3/20/2023  9:30 AM [facility] PT [facility]  [facility]   3/22/2023  9:30 AM [address] [facility]   3/27/2023  9:30 AM [address] [facility]   3/29/2023  9:30 AM [address] [facility]   4/27/2023  9:30 AM ZHEN Adamson - NP [facility] AMB

## 2023-03-20 ENCOUNTER — HOSPITAL ENCOUNTER (OUTPATIENT)
Facility: HOSPITAL | Age: 43
Setting detail: RECURRING SERIES
Discharge: HOME | End: 2023-03-23
Payer: COMMERCIAL

## 2023-03-20 PROCEDURE — 92507 TX SP LANG VOICE COMM INDIV: CPT

## 2023-03-20 NOTE — PROGRESS NOTES
Treatment Provided:  - PS  -STM  -pt/caregiver educ    Patient/Caregiver  Education: [x] Review HEP      HEP/Handouts given: fsequencing simulated errands    Pain Level (0-10 scale) post treatment: 0    ASSESSMENT    []   Improving appropriately and progressing toward goals  [x]   Improving slowly and progressing toward goals  []   Approximating goals/maximum potential  [x]   Continues to benefit from skilled therapy to address remaining functional deficits  []   Not progressing toward goals and plan of care will be adjusted    Patient will continue to benefit from skilled therapy to address remaining functional deficits: cognitive linguistics      Progress towards goals / Updated goals:  1. Pt will recall x3/4 memory strategies and apply them to delayed recall tasks (3-4 words, 3-4 digits, picture retention, and simple novel information) given a 5-10 minute delay with 85% acc given Glenna to improve short term memory warranted for safety, QOL, and independence. Current 3/20/23: Able to to recall WRAP Radha; x10 min delay recalled x4 words with 25% acc RADHA and 75% acc give min-mod cues. 2. Pt will complete sustained/alternating attention tasks up to 3 minutes with 80% acc given Glenna/redirection to task with use of comp strategies in order to safely complete IADLS and increase communication competence. Current 3/20/23:Not targeted this date    3. Pt will complete functional problem solving tasks with 80% acc given min-modA. to enhance communication competence warranted for independent completion of ADLS. Current 3/20/23: Extended time/redirection warranted to complete: 50% acc given mod-maxA     4. Pt will complete functional and simulated cognitive communication ADL tasks with 80% acc given modA/redirection in order to increase executive function skills and independence.   Current 3/20/23:Not targeted this date    PLAN  [x]  Continue plan of care  []  Modify Goals/Treatment Plan      []  Discharge due

## 2023-03-21 RX ORDER — GABAPENTIN 300 MG/1
300 CAPSULE ORAL DAILY
Qty: 30 CAPSULE | Refills: 0 | OUTPATIENT
Start: 2023-03-21 | End: 2023-04-21

## 2023-03-22 ENCOUNTER — HOSPITAL ENCOUNTER (OUTPATIENT)
Facility: HOSPITAL | Age: 43
Setting detail: RECURRING SERIES
Discharge: HOME | End: 2023-03-25
Payer: COMMERCIAL

## 2023-03-22 PROCEDURE — 92507 TX SP LANG VOICE COMM INDIV: CPT

## 2023-03-22 NOTE — PROGRESS NOTES
ST DAILY TREATMENT NOTE    Patient Name: Alexandra Cuba  Date:3/22/2023  : 1980  [x]  Patient  Verified  Payor: Payor: [address] / Plan: [facility] / Product Type: *No Product type* /   In time:930 Out time:1000  Total Treatment Time (min): 30  Visit #: 4 of 8  *PN due 23*    Treatment Diagnosis: R41.841    SUBJECTIVE  Pain Level (0-10 scale): 0  Any medication changes, allergies to medications, adverse drug reactions, diagnosis change, or new procedure performed?: [x] No    [] Yes (see summary sheet for update)    Subjective functional status/changes:   [x] No changes reported      OBJECTIVE  Treatment provided includes:  Increase/Improve:  []  Voice Quality [x]  Cognitive Linguistic Skills []  Laryngeal/Pharyngeal Exercises   []  Vocal Loudness []  Reading Comprehension []  Swallowing Skills    []  Vocal Cord Function []  Auditory Comprehension []  Oral Motor Skills   []  Resonance []  Writing Skills [x]  Compensatory strategies    []  Speech Intelligibility []  Expressive Language [x]  Attention   []  Breath Support/Coord.  []  Receptive language [x]  Memory   []  Articulation []  Safety Awareness [x] Pt/caregiver educ   []  Fluency []  Word Retrieval []        Treatment Provided:  - executive function/ simulated functional cog communication ADL task  -pteduc    Patient/Caregiver  Education: [x] Review HEP      HEP/Handouts given: sequencing simulated errands    Pain Level (0-10 scale) post treatment: 0    ASSESSMENT    []   Improving appropriately and progressing toward goals  [x]   Improving slowly and progressing toward goals  []   Approximating goals/maximum potential  [x]   Continues to benefit from skilled therapy to address remaining functional deficits  []   Not progressing toward goals and plan of care will be adjusted    Patient will continue to benefit from skilled therapy to address remaining functional deficits: cognitive
therapy  []Other:________________________________________________________________    Thank you for this referral.   LETTY Booker     3/22/2023     9:45 AM  MA, CCC-SLP  Speech-Language Pathologist    Certification Period: 3/23/23--4/20/23

## 2023-03-27 ENCOUNTER — HOSPITAL ENCOUNTER (OUTPATIENT)
Facility: HOSPITAL | Age: 43
Setting detail: RECURRING SERIES
Discharge: HOME | End: 2023-03-30
Payer: COMMERCIAL

## 2023-03-27 DIAGNOSIS — R41.3 MEMORY LOSS: ICD-10-CM

## 2023-03-27 DIAGNOSIS — R41.9 COGNITIVE COMPLAINTS: Primary | ICD-10-CM

## 2023-03-27 PROCEDURE — 92507 TX SP LANG VOICE COMM INDIV: CPT

## 2023-03-27 NOTE — PROGRESS NOTES
towards goals / Updated goals:  1. Pt will recall x3/4 memory strategies and apply them to delayed recall tasks (3-4 words, 3-4 digits, picture retention, and simple novel information) given a 5-10 minute delay with 85% acc given Glenna to improve short term memory warranted for safety, QOL, and independence. Current 3/27/23: Not targeted this date     2. Pt will complete sustained/alternating attention tasks up to 3 minutes with 80% acc given Glenna/redirection to task with use of comp strategies in order to safely complete IADLS and increase communication competence. Current 3/27/23:Not targeted this date     3. Pt will complete functional problem solving tasks with 80% acc given min-modA. to enhance communication competence warranted for independent completion of ADLS. Current 3/27/23: Not targeted this date     4. Pt will complete functional and simulated cognitive communication ADL tasks with 80% acc given modA/redirection in order to increase executive function skills and independence.   Current 3/27/23:60% acc give modA/redirection/ reps and extended time    PLAN  []  Continue plan of care  [x]  Modify Goals/Treatment Plan      []  Discharge due to:  [] Other:    Maura Castaneda, SLP 3/27/2023  9:40 AM  MA, CCC-SLP  Speech-Language Pathologist    Future Appointments   Date Time Provider Dusty Barrios   3/29/2023  9:30 AM [address] [facility]   4/27/2023  9:30 AM ZHEN Saldana - NP [facility] AMB

## 2023-03-29 ENCOUNTER — HOSPITAL ENCOUNTER (OUTPATIENT)
Facility: HOSPITAL | Age: 43
Setting detail: RECURRING SERIES
Discharge: HOME | End: 2023-04-01
Payer: COMMERCIAL

## 2023-03-29 PROCEDURE — 92507 TX SP LANG VOICE COMM INDIV: CPT

## 2023-03-29 NOTE — PROGRESS NOTES
ST DAILY TREATMENT NOTE    Patient Name: Joaquim Barlow  Date:3/29/2023  : 1980  [x]  Patient  Verified  Payor: Payor: [address] / Plan: [facility] / Product Type: *No Product type* /   In time:930 Out time:1000  Total Treatment Time (min): 30  Visit #: 2 of 8  *PN due 23*    Treatment Diagnosis: R41.841    SUBJECTIVE  Pain Level (0-10 scale): 0  Any medication changes, allergies to medications, adverse drug reactions, diagnosis change, or new procedure performed?: [x] No    [] Yes (see summary sheet for update)    Subjective functional status/changes:   [x] No changes reported      OBJECTIVE  Treatment provided includes:  Increase/Improve:  []  Voice Quality [x]  Cognitive Linguistic Skills []  Laryngeal/Pharyngeal Exercises   []  Vocal Loudness []  Reading Comprehension []  Swallowing Skills    []  Vocal Cord Function []  Auditory Comprehension []  Oral Motor Skills   []  Resonance []  Writing Skills [x]  Compensatory strategies    []  Speech Intelligibility []  Expressive Language []  Attention   []  Breath Support/Coord. []  Receptive language [x]  Memory   []  Articulation []  Safety Awareness [x] Pt educ   []  Fluency []  Word Retrieval []        Treatment Provided:  - PS  -delayed recall with comp strategies  -pt educ    Patient/Caregiver  Education: [x] Review HEP      HEP/Handouts given: n/a    Pain Level (0-10 scale) post treatment: 0    ASSESSMENT    []   Improving appropriately and progressing toward goals  [x]   Improving slowly and progressing toward goals  []   Approximating goals/maximum potential  [x]   Continues to benefit from skilled therapy to address remaining functional deficits  []   Not progressing toward goals and plan of care will be adjusted    Patient will continue to benefit from skilled therapy to address remaining functional deficits: cognitive linguistics      Progress towards goals / Updated goals:  1.  Pt will recall x3/4

## 2023-04-03 ENCOUNTER — TELEPHONE (OUTPATIENT)
Age: 43
End: 2023-04-03

## 2023-04-05 ENCOUNTER — HOSPITAL ENCOUNTER (OUTPATIENT)
Facility: HOSPITAL | Age: 43
Setting detail: RECURRING SERIES
Discharge: HOME | End: 2023-04-08
Payer: COMMERCIAL

## 2023-04-05 PROCEDURE — 92507 TX SP LANG VOICE COMM INDIV: CPT

## 2023-04-05 NOTE — PROGRESS NOTES
continue to benefit from skilled therapy to address remaining functional deficits: cognitive linguistics      Progress towards goals / Updated goals:  1. Pt will recall x3/4 memory strategies and apply them to delayed recall tasks (3-4 words, 3-4 digits, picture retention, and simple novel information) given a 5-10 minute delay with 85% acc given Glenna to improve short term memory warranted for safety, QOL, and independence. Current 4/5/23: Not targeted this date    2. Pt will complete sustained/alternating attention tasks up to 3 minutes with 80% acc given Glenna/redirection to task with use of comp strategies in order to safely complete IADLS and increase communication competence. Current 4/5/23:Counting backwards by 6s: 90% acc  Counting backwards by 3s: 90% acc  Alternating letters/counting by 2s: 80% acc given modA     3. Pt will complete functional problem solving tasks with 80% acc given min-modA. to enhance communication competence warranted for independent completion of ADLS. Current 4/5/23:  Mathematical simulated financial task: 100% acc givne Glenna and significant time extension      4. Pt will complete functional and simulated cognitive communication ADL tasks with 80% acc given modA/redirection in order to increase executive function skills and independence.   Current 4/5/23: not targeted this date    PLAN  [x]  Continue plan of care  []  Modify Goals/Treatment Plan      []  Discharge due to:  [] Other:    Cristin Collado, SLP 4/5/2023  9:38 AM  MA, CCC-SLP  Speech-Language Pathologist    Future Appointments   Date Time Provider Dusty Barrios   4/12/2023  9:30 AM [facility] Kowalski [facility]   4/27/2023  9:30 AM ZHEN Tompkins - NP [facility] AMB

## 2023-04-24 ENCOUNTER — HOSPITAL ENCOUNTER (OUTPATIENT)
Facility: HOSPITAL | Age: 43
Setting detail: RECURRING SERIES
Discharge: HOME | End: 2023-04-27
Payer: COMMERCIAL

## 2023-04-24 PROCEDURE — 92507 TX SP LANG VOICE COMM INDIV: CPT

## 2023-04-24 NOTE — PROGRESS NOTES
ST DAILY TREATMENT NOTE    Patient Name: Fritz James  Date:2023  : 1980  [x]  Patient  Verified  Payor: Payor: [address] [place] [address] Road / [facility] / Product Type: *No Product type* /   In time:1005 Out time:1040  Total Treatment Time (min): 35  Visit #: 1 of 8  *PN due 23*  Auth 12 V until 24  Treatment Diagnosis: R41.841    SUBJECTIVE  Pain Level (0-10 scale): 0  Any medication changes, allergies to medications, adverse drug reactions, diagnosis change, or new procedure performed?: [x] No    [] Yes (see summary sheet for update)    Subjective functional status/changes:   [x] No changes reported      OBJECTIVE  Treatment provided includes:  Increase/Improve:  []  Voice Quality [x]  Cognitive Linguistic Skills []  Laryngeal/Pharyngeal Exercises   []  Vocal Loudness []  Reading Comprehension []  Swallowing Skills    []  Vocal Cord Function []  Auditory Comprehension []  Oral Motor Skills   []  Resonance []  Writing Skills [x]  Compensatory strategies    []  Speech Intelligibility []  Expressive Language []  Attention   []  Breath Support/Coord.  []  Receptive language [x]  Memory   []  Articulation []  Safety Awareness [x] Pt educ   []  Fluency []  Word Retrieval []        Treatment Provided:  - PS  -alternating attention  -pt educ  -functional communication tasks at [employer] level    Patient/Caregiver  Education: [x] Review HEP      HEP/Handouts given: n/a    Pain Level (0-10 scale) post treatment: 0    ASSESSMENT    []   Improving appropriately and progressing toward goals  [x]   Improving slowly and progressing toward goals  []   Approximating goals/maximum potential  [x]   Continues to benefit from skilled therapy to address remaining functional deficits  []   Not progressing toward goals and plan of care will be adjusted    Patient will continue to benefit from skilled therapy to address remaining functional deficits: cognitive
skilled ST services to address cognitive re-training for safety, independence, QOL, and dignity. All goals have been reviewed; continue addressing all goals accordingly.      ASSESSMENT/RECOMMENDATIONS:  []Continue therapy per initial plan/protocol at a frequency of    [x]Continue therapy with the following recommended changes:2 x per week for 12/12 weeks remaining  []Discontinue therapy progressing towards or have reached established goals  []Discontinue therapy due to lack of appreciable progress towards goals  []Discontinue therapy due to lack of attendance or compliance  []Await Physician's recommendations/decisions regarding therapy  []Other:________________________________________________________________    Thank you for this referral.   LETTY Merrill     4/24/2023     5:23 PM  MA, [address] Pathologist    Certification Period: 4/24/23--5/22/23  Auth: Marbin Daniels V until 7/31/23

## 2023-04-27 ENCOUNTER — HOSPITAL ENCOUNTER (OUTPATIENT)
Facility: HOSPITAL | Age: 43
Discharge: HOME | End: 2023-04-27
Payer: COMMERCIAL

## 2023-04-27 ENCOUNTER — OFFICE VISIT (OUTPATIENT)
Age: 43
End: 2023-04-27
Payer: COMMERCIAL

## 2023-04-27 VITALS
RESPIRATION RATE: 18 BRPM | DIASTOLIC BLOOD PRESSURE: 66 MMHG | SYSTOLIC BLOOD PRESSURE: 112 MMHG | BODY MASS INDEX: 31.56 KG/M2 | WEIGHT: 272.8 LBS | OXYGEN SATURATION: 96 % | HEIGHT: 78 IN | HEART RATE: 86 BPM

## 2023-04-27 DIAGNOSIS — R41.9 COGNITIVE COMPLAINTS: Primary | ICD-10-CM

## 2023-04-27 DIAGNOSIS — Z59.9 NEED FOR FINANCIAL SUPPORT: ICD-10-CM

## 2023-04-27 DIAGNOSIS — R26.9 GAIT DISORDER: ICD-10-CM

## 2023-04-27 DIAGNOSIS — F41.9 ANXIETY: ICD-10-CM

## 2023-04-27 DIAGNOSIS — R41.3 MEMORY LOSS: ICD-10-CM

## 2023-04-27 DIAGNOSIS — Z86.69 HISTORY OF ENCEPHALOPATHY: ICD-10-CM

## 2023-04-27 DIAGNOSIS — Z78.9 NEED FOR FOLLOW-UP BY SOCIAL WORKER: ICD-10-CM

## 2023-04-27 DIAGNOSIS — F39 MOOD DISORDER (HCC): ICD-10-CM

## 2023-04-27 DIAGNOSIS — R20.2 PARESTHESIA OF SKIN: ICD-10-CM

## 2023-04-27 LAB
ALBUMIN SERPL-MCNC: 4.4 G/DL (ref 3.4–5)
ALBUMIN/GLOB SERPL: 1.4 (ref 0.8–1.7)
ALP SERPL-CCNC: 101 U/L (ref 45–117)
ALT SERPL-CCNC: 31 U/L (ref 16–61)
ANION GAP SERPL CALC-SCNC: 6 MMOL/L (ref 3–18)
AST SERPL-CCNC: 24 U/L (ref 10–38)
BILIRUB SERPL-MCNC: 0.4 MG/DL (ref 0.2–1)
BUN SERPL-MCNC: 15 MG/DL (ref 7–18)
BUN/CREAT SERPL: 17 (ref 12–20)
CALCIUM SERPL-MCNC: 9.4 MG/DL (ref 8.5–10.1)
CHLORIDE SERPL-SCNC: 100 MMOL/L (ref 100–111)
CO2 SERPL-SCNC: 32 MMOL/L (ref 21–32)
CREAT SERPL-MCNC: 0.88 MG/DL (ref 0.6–1.3)
FOLATE SERPL-MCNC: 15.4 NG/ML (ref 3.1–17.5)
GLOBULIN SER CALC-MCNC: 3.2 G/DL (ref 2–4)
GLUCOSE SERPL-MCNC: 87 MG/DL (ref 74–99)
POTASSIUM SERPL-SCNC: 4.2 MMOL/L (ref 3.5–5.5)
PROT SERPL-MCNC: 7.6 G/DL (ref 6.4–8.2)
SODIUM SERPL-SCNC: 138 MMOL/L (ref 136–145)
VIT B12 SERPL-MCNC: 427 PG/ML (ref 211–911)

## 2023-04-27 PROCEDURE — 99213 OFFICE O/P EST LOW 20 MIN: CPT | Performed by: NURSE PRACTITIONER

## 2023-04-27 PROCEDURE — 82607 VITAMIN B-12: CPT

## 2023-04-27 PROCEDURE — 36415 COLL VENOUS BLD VENIPUNCTURE: CPT

## 2023-04-27 PROCEDURE — 82746 ASSAY OF FOLIC ACID SERUM: CPT

## 2023-04-27 PROCEDURE — 80053 COMPREHEN METABOLIC PANEL: CPT

## 2023-04-27 RX ORDER — LANOLIN ALCOHOL/MO/W.PET/CERES
1000 CREAM (GRAM) TOPICAL DAILY
Qty: 30 TABLET | Refills: 6 | Status: SHIPPED | OUTPATIENT
Start: 2023-04-27

## 2023-04-27 RX ORDER — GABAPENTIN 300 MG/1
300 CAPSULE ORAL 2 TIMES DAILY
Qty: 60 CAPSULE | Refills: 5 | Status: SHIPPED | OUTPATIENT
Start: 2023-04-27 | End: 2023-10-26

## 2023-04-27 SDOH — ECONOMIC STABILITY - INCOME SECURITY: PROBLEM RELATED TO HOUSING AND ECONOMIC CIRCUMSTANCES, UNSPECIFIED: Z59.9

## 2023-04-28 ENCOUNTER — TELEPHONE (OUTPATIENT)
Age: 43
End: 2023-04-28

## 2023-05-04 ENCOUNTER — HOSPITAL ENCOUNTER (OUTPATIENT)
Facility: HOSPITAL | Age: 43
Setting detail: RECURRING SERIES
Discharge: HOME | End: 2023-05-07
Payer: COMMERCIAL

## 2023-05-04 PROCEDURE — 92507 TX SP LANG VOICE COMM INDIV: CPT

## 2023-05-09 ENCOUNTER — HOSPITAL ENCOUNTER (OUTPATIENT)
Facility: HOSPITAL | Age: 43
Setting detail: RECURRING SERIES
Discharge: HOME | End: 2023-05-12
Payer: COMMERCIAL

## 2023-05-09 PROCEDURE — 92507 TX SP LANG VOICE COMM INDIV: CPT

## 2023-05-09 NOTE — PROGRESS NOTES
ST DAILY TREATMENT NOTE    Patient Name: Alivia Hunter  Date:2023  : 1980  [x]  Patient  Verified  Payor: Payor: [address] [place] [address] Road / Plan: [facility] / Product Type: *No Product type* /   In time:  Out time: 12:00  Total Treatment Time (min): 36  Visit #: 3 of 8  *PN due 23*    Treatment Diagnosis: R41.841    SUBJECTIVE  Pain Level (0-10 scale): 0  Any medication changes, allergies to medications, adverse drug reactions, diagnosis change, or new procedure performed?: [x] No    [] Yes (see summary sheet for update)    Subjective functional status/changes:   [x] No changes reported      OBJECTIVE  Treatment provided includes:  Increase/Improve:  []  Voice Quality [x]  Cognitive Linguistic Skills []  Laryngeal/Pharyngeal Exercises   []  Vocal Loudness []  Reading Comprehension []  Swallowing Skills    []  Vocal Cord Function []  Auditory Comprehension []  Oral Motor Skills   []  Resonance []  Writing Skills [x]  Compensatory strategies    []  Speech Intelligibility []  Expressive Language [x]  Attention   []  Breath Support/Coord.  []  Receptive language []  Memory   []  Articulation; []  Safety Awareness [x] Pt educ   []  Fluency []  Word Retrieval []        Treatment Provided:  -problem solving  -alternating attention  -pt educ  Patient/Caregiver  Education: [x] Review HEP      HEP/Handouts given: Cause/consequences/ list of games/apps for alternating attention    Pain Level (0-10 scale) post treatment: 5    ASSESSMENT     []   Improving appropriately and progressing toward goals  [x]   Improving slowly and progressing toward goals  []   Approximating goals/maximum potential  [x]   Continues to benefit from skilled therapy to address remaining functional deficits  []   Not progressing toward goals and plan of care will be adjusted    Patient will continue to benefit from skilled therapy to address remaining functional deficits: cognitive linguistics    Progress

## 2023-05-16 ENCOUNTER — HOSPITAL ENCOUNTER (OUTPATIENT)
Facility: HOSPITAL | Age: 43
Setting detail: RECURRING SERIES
Discharge: HOME | End: 2023-05-19
Payer: COMMERCIAL

## 2023-05-16 PROCEDURE — 92507 TX SP LANG VOICE COMM INDIV: CPT

## 2023-05-16 NOTE — PROGRESS NOTES
ST DAILY TREATMENT NOTE    Patient Name: Darya Caldwell  Date:2023  : 1980  [x]  Patient  Verified  Payor: Payor: [address] [place] [address] Road / Plan: [place] [facility] / Product Type: *No Product type* /   In time:  Out time: 12:05  Total Treatment Time (min): 40  Visit #: 4 of 8  *PN due 23*    Treatment Diagnosis: R41.841    SUBJECTIVE  Pain Level (0-10 scale): 0  Any medication changes, allergies to medications, adverse drug reactions, diagnosis change, or new procedure performed?: [x] No    [] Yes (see summary sheet for update)    Subjective functional status/changes:   [x] No changes reported    OBJECTIVE  Treatment provided includes:  Increase/Improve:  []  Voice Quality [x]  Cognitive Linguistic Skills []  Laryngeal/Pharyngeal Exercises   []  Vocal Loudness []  Reading Comprehension []  Swallowing Skills    []  Vocal Cord Function []  Auditory Comprehension []  Oral Motor Skills   []  Resonance []  Writing Skills [x]  Compensatory strategies    []  Speech Intelligibility []  Expressive Language [x]  Attention   []  Breath Support/Coord.  []  Receptive language []  Memory   []  Articulation; []  Safety Awareness [x] Pt educ   []  Fluency []  Word Retrieval []      Treatment Provided:  -functional and simulated cognitive communication ADL tasks: scheduling eye appt after calling insurance company to find approved providers  -pt educ  Patient/Caregiver  Education: [x] Review HEP      HEP/Handouts given: functional ADL task    Pain Level (0-10 scale) post treatment: 0    ASSESSMENT   []   Improving appropriately and progressing toward goals  [x]   Improving slowly and progressing toward goals  []   Approximating goals/maximum potential  [x]   Continues to benefit from skilled therapy to address remaining functional deficits  []   Not progressing toward goals and plan of care will be adjusted    Patient will continue to benefit from skilled therapy to address remaining

## 2023-05-22 ENCOUNTER — TELEPHONE (OUTPATIENT)
Age: 43
End: 2023-05-22

## 2023-05-23 ENCOUNTER — APPOINTMENT (OUTPATIENT)
Facility: HOSPITAL | Age: 43
End: 2023-05-23
Payer: COMMERCIAL

## 2023-05-25 ENCOUNTER — HOSPITAL ENCOUNTER (OUTPATIENT)
Facility: HOSPITAL | Age: 43
Setting detail: RECURRING SERIES
Discharge: HOME | End: 2023-05-28
Payer: COMMERCIAL

## 2023-05-25 PROCEDURE — 92507 TX SP LANG VOICE COMM INDIV: CPT

## 2023-05-30 ENCOUNTER — HOSPITAL ENCOUNTER (OUTPATIENT)
Facility: HOSPITAL | Age: 43
Setting detail: RECURRING SERIES
Discharge: HOME | End: 2023-06-02
Payer: COMMERCIAL

## 2023-05-30 PROCEDURE — 92507 TX SP LANG VOICE COMM INDIV: CPT

## 2023-05-30 NOTE — PROGRESS NOTES
ST DAILY TREATMENT NOTE    Patient Name: Dean Ward  Date:2023  : 1980  [x]  Patient  Verified  Payor: Payor: [address] [place] [address] Road / Plan: [facility] / Product Type: *No Product type* /   In time:  Out time: 12:00  Total Treatment Time (min): 33  Visit #: 2 of 8  *PN due 23*  Auth: V #[id number] by 23  Treatment Diagnosis: X96.4029    SUBJECTIVE  Pain Level (0-10 scale): 0  Any medication changes, allergies to medications, adverse drug reactions, diagnosis change, or new procedure performed?: [x] No    [] Yes (see summary sheet for update)    Subjective functional status/changes:   [x] No changes reported  Pt presents to tx again this date appearing \"off\" he was dressed in a button down shirt and dress slacks stating he was supposed to go to work today, however the are jerking him around. He further states he went back to where he used to work for a job he had prior to [employer]. His thoughts were disorganized and he was unable to truly express whether he was attempting to be rehired or if he really has gone through the hiring process. He was frustrated and stated he can't remember things, however, he was not using previously implemented comp strategies, specifically write it down using his notebook. He did infact carry his notebook to tx this date, but wasn't functionally using. He also did not complete the last x2 assignments given stating he's been busy trying to figure out his job stuff. Pt educ re: how to effectively utilize his notebook re: checklists, notations following phone calls, and in person appointments. Example provided for him to keep. Pt appears more anxious and confused, but states it's because he's puttin pressure on his self for getting a job. He denies any change in medications or any additional life changes. Will continue to monitor next session. Significant time spent counseling and educating pt.      OBJECTIVE  Treatment provided

## 2023-06-05 ENCOUNTER — HOSPITAL ENCOUNTER (OUTPATIENT)
Facility: HOSPITAL | Age: 43
Setting detail: RECURRING SERIES
Discharge: HOME | End: 2023-06-08
Payer: COMMERCIAL

## 2023-06-05 PROCEDURE — 92507 TX SP LANG VOICE COMM INDIV: CPT

## 2023-06-05 NOTE — PROGRESS NOTES
progressing toward goals and plan of care will be adjusted    Patient will continue to benefit from skilled therapy to address remaining functional deficits: cognitive linguistics    Progress towards goals / Updated goals:  1. Pt will recall x3/4 memory strategies and apply them to delayed recall tasks (3-4 words/items,  picture retention, and simple novel information) given a 5-10 minute delay with 85% acc given Glenna to improve short term memory warranted for safety, QOL, and independence. Current: Able to recall x4 comp strategies herminio;  Delayed recall with write it down and rehearsal: 60% acc    2. Pt will complete sustained/alternating attention tasks up to 5 minutes with 80% acc given Glenna/redirection with imposed noise/distractions to task with use of comp strategies in order to safely complete IADLS and increase communication competence. Current:  Not targeted this date      3. Pt will complete functional problem solving tasks with 85% acc given Glenna. to enhance communication competence warranted for independent completion of ADLS. Current:  75% acc given Glenna      4. Pt will complete functional and simulated cognitive communication ADL tasks with 90% acc given Glenna/redirection in 3/3 sessions in order to increase executive function skills and independence.   Current: Not targeted this date     [x]  Continue plan of care  []  Modify Goals/Treatment Plan      []  Discharge due to:  [] Other:    Nette Diallo, SLP 6/5/2023  2:09 PM  MA, Sharan N Casey Girard Pathologist    Future Appointments   Date Time Provider Dusty Barrios   7/27/2023  1:30 PM José Farris, APRN - NP [facility]

## 2023-06-21 ENCOUNTER — TELEPHONE (OUTPATIENT)
Age: 43
End: 2023-06-21

## 2023-06-29 ENCOUNTER — APPOINTMENT (OUTPATIENT)
Facility: HOSPITAL | Age: 43
End: 2023-06-29
Payer: COMMERCIAL

## 2024-07-17 DIAGNOSIS — R20.2 PARESTHESIA OF SKIN: ICD-10-CM

## 2024-07-19 RX ORDER — GABAPENTIN 300 MG/1
CAPSULE ORAL
Qty: 60 CAPSULE | OUTPATIENT
Start: 2024-07-19